# Patient Record
Sex: MALE | Race: WHITE | NOT HISPANIC OR LATINO | Employment: FULL TIME | URBAN - METROPOLITAN AREA
[De-identification: names, ages, dates, MRNs, and addresses within clinical notes are randomized per-mention and may not be internally consistent; named-entity substitution may affect disease eponyms.]

---

## 2017-01-25 ENCOUNTER — GENERIC CONVERSION - ENCOUNTER (OUTPATIENT)
Dept: OTHER | Facility: OTHER | Age: 47
End: 2017-01-25

## 2017-02-01 ENCOUNTER — ALLSCRIPTS OFFICE VISIT (OUTPATIENT)
Dept: OTHER | Facility: OTHER | Age: 47
End: 2017-02-01

## 2017-02-01 ENCOUNTER — TRANSCRIBE ORDERS (OUTPATIENT)
Dept: ADMINISTRATIVE | Facility: HOSPITAL | Age: 47
End: 2017-02-01

## 2017-02-01 DIAGNOSIS — J45.909 UNCOMPLICATED ASTHMA: ICD-10-CM

## 2017-02-01 DIAGNOSIS — J45.909 ASTHMATIC BRONCHITIS, UNSPECIFIED ASTHMA SEVERITY, UNCOMPLICATED: Primary | ICD-10-CM

## 2017-02-01 DIAGNOSIS — R07.89 OTHER CHEST PAIN: ICD-10-CM

## 2017-02-07 ENCOUNTER — GENERIC CONVERSION - ENCOUNTER (OUTPATIENT)
Dept: OTHER | Facility: OTHER | Age: 47
End: 2017-02-07

## 2017-02-07 ENCOUNTER — HOSPITAL ENCOUNTER (OUTPATIENT)
Dept: PULMONOLOGY | Facility: HOSPITAL | Age: 47
Discharge: HOME | End: 2017-02-07
Attending: INTERNAL MEDICINE
Payer: COMMERCIAL

## 2017-02-07 DIAGNOSIS — J45.909 ASTHMATIC BRONCHITIS, UNSPECIFIED ASTHMA SEVERITY, UNCOMPLICATED: ICD-10-CM

## 2017-02-07 PROCEDURE — 94060 EVALUATION OF WHEEZING: CPT

## 2017-02-07 PROCEDURE — 94729 DIFFUSING CAPACITY: CPT

## 2017-02-07 PROCEDURE — 94760 N-INVAS EAR/PLS OXIMETRY 1: CPT

## 2017-02-07 PROCEDURE — 94726 PLETHYSMOGRAPHY LUNG VOLUMES: CPT

## 2017-02-07 RX ORDER — SODIUM CHLORIDE FOR INHALATION 0.9 %
VIAL, NEBULIZER (ML) INHALATION
Status: DISCONTINUED
Start: 2017-02-07 | End: 2017-02-11 | Stop reason: HOSPADM

## 2017-02-09 ENCOUNTER — GENERIC CONVERSION - ENCOUNTER (OUTPATIENT)
Dept: OTHER | Facility: OTHER | Age: 47
End: 2017-02-09

## 2017-02-14 ENCOUNTER — GENERIC CONVERSION - ENCOUNTER (OUTPATIENT)
Dept: OTHER | Facility: OTHER | Age: 47
End: 2017-02-14

## 2017-02-24 ENCOUNTER — GENERIC CONVERSION - ENCOUNTER (OUTPATIENT)
Dept: OTHER | Facility: OTHER | Age: 47
End: 2017-02-24

## 2017-03-15 ENCOUNTER — GENERIC CONVERSION - ENCOUNTER (OUTPATIENT)
Dept: OTHER | Facility: OTHER | Age: 47
End: 2017-03-15

## 2017-03-27 ENCOUNTER — GENERIC CONVERSION - ENCOUNTER (OUTPATIENT)
Dept: OTHER | Facility: OTHER | Age: 47
End: 2017-03-27

## 2017-03-28 ENCOUNTER — GENERIC CONVERSION - ENCOUNTER (OUTPATIENT)
Dept: OTHER | Facility: OTHER | Age: 47
End: 2017-03-28

## 2017-04-05 ENCOUNTER — GENERIC CONVERSION - ENCOUNTER (OUTPATIENT)
Dept: OTHER | Facility: OTHER | Age: 47
End: 2017-04-05

## 2017-05-13 ENCOUNTER — GENERIC CONVERSION - ENCOUNTER (OUTPATIENT)
Dept: OTHER | Facility: OTHER | Age: 47
End: 2017-05-13

## 2017-05-26 ENCOUNTER — GENERIC CONVERSION - ENCOUNTER (OUTPATIENT)
Dept: OTHER | Facility: OTHER | Age: 47
End: 2017-05-26

## 2017-06-21 ENCOUNTER — GENERIC CONVERSION - ENCOUNTER (OUTPATIENT)
Dept: OTHER | Facility: OTHER | Age: 47
End: 2017-06-21

## 2017-07-27 ENCOUNTER — TRANSCRIBE ORDERS (OUTPATIENT)
Dept: SLEEP CENTER | Facility: CLINIC | Age: 47
End: 2017-07-27

## 2017-07-27 DIAGNOSIS — G47.33 OSA AND COPD OVERLAP SYNDROME (HCC): Primary | ICD-10-CM

## 2017-07-27 DIAGNOSIS — J44.9 OSA AND COPD OVERLAP SYNDROME (HCC): Primary | ICD-10-CM

## 2017-07-31 ENCOUNTER — TRANSCRIBE ORDERS (OUTPATIENT)
Dept: SLEEP CENTER | Facility: CLINIC | Age: 47
End: 2017-07-31

## 2017-07-31 ENCOUNTER — HOSPITAL ENCOUNTER (OUTPATIENT)
Dept: SLEEP CENTER | Facility: CLINIC | Age: 47
Discharge: HOME | End: 2017-07-31
Payer: COMMERCIAL

## 2017-07-31 DIAGNOSIS — J44.9 OSA AND COPD OVERLAP SYNDROME (HCC): ICD-10-CM

## 2017-07-31 DIAGNOSIS — G47.33 OSA AND COPD OVERLAP SYNDROME (HCC): ICD-10-CM

## 2017-07-31 DIAGNOSIS — G47.33 OSA (OBSTRUCTIVE SLEEP APNEA): Primary | ICD-10-CM

## 2017-08-03 ENCOUNTER — ALLSCRIPTS OFFICE VISIT (OUTPATIENT)
Dept: OTHER | Facility: OTHER | Age: 47
End: 2017-08-03

## 2017-08-03 DIAGNOSIS — M54.2 CERVICALGIA: ICD-10-CM

## 2017-08-03 DIAGNOSIS — M25.511 PAIN IN RIGHT SHOULDER: ICD-10-CM

## 2017-08-03 DIAGNOSIS — M26.629 ARTHRALGIA OF TEMPOROMANDIBULAR JOINT: ICD-10-CM
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
## 2017-08-03 DIAGNOSIS — M25.512 PAIN IN LEFT SHOULDER: ICD-10-CM

## 2017-08-03 DIAGNOSIS — M79.671 PAIN OF RIGHT FOOT: ICD-10-CM

## 2017-08-04 ENCOUNTER — GENERIC CONVERSION - ENCOUNTER (OUTPATIENT)
Dept: OTHER | Facility: OTHER | Age: 47
End: 2017-08-04

## 2017-08-18 ENCOUNTER — GENERIC CONVERSION - ENCOUNTER (OUTPATIENT)
Dept: OTHER | Facility: OTHER | Age: 47
End: 2017-08-18

## 2017-08-23 ENCOUNTER — GENERIC CONVERSION - ENCOUNTER (OUTPATIENT)
Dept: OTHER | Facility: OTHER | Age: 47
End: 2017-08-23

## 2017-08-29 ENCOUNTER — TRANSCRIBE ORDERS (OUTPATIENT)
Dept: ADMINISTRATIVE | Facility: HOSPITAL | Age: 47
End: 2017-08-29

## 2017-08-29 DIAGNOSIS — R20.0 TACTILE ANESTHESIA: Primary | ICD-10-CM

## 2017-08-31 ENCOUNTER — ALLSCRIPTS OFFICE VISIT (OUTPATIENT)
Dept: OTHER | Facility: OTHER | Age: 47
End: 2017-08-31

## 2017-09-07 ENCOUNTER — HOSPITAL ENCOUNTER (OUTPATIENT)
Dept: NEUROLOGY | Facility: CLINIC | Age: 47
Discharge: HOME | End: 2017-09-07
Payer: COMMERCIAL

## 2017-09-07 DIAGNOSIS — R20.0 TACTILE ANESTHESIA: ICD-10-CM

## 2017-09-07 PROCEDURE — 95886 MUSC TEST DONE W/N TEST COMP: CPT

## 2017-09-07 PROCEDURE — 95909 NRV CNDJ TST 5-6 STUDIES: CPT

## 2017-09-08 ENCOUNTER — ALLSCRIPTS OFFICE VISIT (OUTPATIENT)
Dept: OTHER | Facility: OTHER | Age: 47
End: 2017-09-08

## 2017-09-11 ENCOUNTER — GENERIC CONVERSION - ENCOUNTER (OUTPATIENT)
Dept: OTHER | Facility: OTHER | Age: 47
End: 2017-09-11

## 2017-09-14 ENCOUNTER — APPOINTMENT (OUTPATIENT)
Dept: RADIOLOGY | Facility: MEDICAL CENTER | Age: 47
End: 2017-09-14
Payer: COMMERCIAL

## 2017-09-14 ENCOUNTER — TRANSCRIBE ORDERS (OUTPATIENT)
Dept: ADMINISTRATIVE | Facility: HOSPITAL | Age: 47
End: 2017-09-14

## 2017-09-14 DIAGNOSIS — M79.671 PAIN OF RIGHT FOOT: ICD-10-CM

## 2017-09-14 PROCEDURE — 73630 X-RAY EXAM OF FOOT: CPT

## 2017-09-14 PROCEDURE — 73610 X-RAY EXAM OF ANKLE: CPT

## 2017-09-18 ENCOUNTER — GENERIC CONVERSION - ENCOUNTER (OUTPATIENT)
Dept: OTHER | Facility: OTHER | Age: 47
End: 2017-09-18

## 2017-09-27 ENCOUNTER — HOSPITAL ENCOUNTER (OUTPATIENT)
Dept: SLEEP CENTER | Facility: CLINIC | Age: 47
Discharge: HOME | End: 2017-09-27
Payer: COMMERCIAL

## 2017-09-27 DIAGNOSIS — G47.33 OSA (OBSTRUCTIVE SLEEP APNEA): ICD-10-CM

## 2017-09-27 PROCEDURE — 95810 POLYSOM 6/> YRS 4/> PARAM: CPT

## 2017-10-09 ENCOUNTER — HOSPITAL ENCOUNTER (OUTPATIENT)
Dept: SLEEP CENTER | Facility: CLINIC | Age: 47
Discharge: HOME | End: 2017-10-09
Payer: COMMERCIAL

## 2017-10-09 DIAGNOSIS — G47.33 OSA (OBSTRUCTIVE SLEEP APNEA): ICD-10-CM

## 2017-10-12 ENCOUNTER — GENERIC CONVERSION - ENCOUNTER (OUTPATIENT)
Dept: OTHER | Facility: OTHER | Age: 47
End: 2017-10-12

## 2017-10-19 ENCOUNTER — ALLSCRIPTS OFFICE VISIT (OUTPATIENT)
Dept: OTHER | Facility: OTHER | Age: 47
End: 2017-10-19

## 2017-10-19 DIAGNOSIS — M79.10 MYALGIA: ICD-10-CM

## 2017-10-19 DIAGNOSIS — M54.2 CERVICALGIA: ICD-10-CM
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
## 2017-10-30 ENCOUNTER — GENERIC CONVERSION - ENCOUNTER (OUTPATIENT)
Dept: OTHER | Facility: OTHER | Age: 47
End: 2017-10-30

## 2017-10-31 ENCOUNTER — ALLSCRIPTS OFFICE VISIT (OUTPATIENT)
Dept: OTHER | Facility: OTHER | Age: 47
End: 2017-10-31

## 2017-11-01 ENCOUNTER — GENERIC CONVERSION - ENCOUNTER (OUTPATIENT)
Dept: OTHER | Facility: OTHER | Age: 47
End: 2017-11-01
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
## 2017-11-07 ENCOUNTER — GENERIC CONVERSION - ENCOUNTER (OUTPATIENT)
Dept: OTHER | Facility: OTHER | Age: 47
End: 2017-11-07

## 2017-11-08 ENCOUNTER — GENERIC CONVERSION - ENCOUNTER (OUTPATIENT)
Dept: OTHER | Facility: OTHER | Age: 47
End: 2017-11-08

## 2017-12-08 ENCOUNTER — GENERIC CONVERSION - ENCOUNTER (OUTPATIENT)
Dept: PERINATAL CARE | Facility: MEDICAL CENTER | Age: 47
End: 2017-12-08

## 2017-12-18 ENCOUNTER — ALLSCRIPTS OFFICE VISIT (OUTPATIENT)
Dept: OTHER | Facility: OTHER | Age: 47
End: 2017-12-18

## 2017-12-20 ENCOUNTER — GENERIC CONVERSION - ENCOUNTER (OUTPATIENT)
Dept: OTHER | Facility: OTHER | Age: 47
End: 2017-12-20

## 2017-12-26 ENCOUNTER — GENERIC CONVERSION - ENCOUNTER (OUTPATIENT)
Dept: OTHER | Facility: OTHER | Age: 47
End: 2017-12-26

## 2018-01-02 ENCOUNTER — ALLSCRIPTS OFFICE VISIT (OUTPATIENT)
Dept: OTHER | Facility: OTHER | Age: 48
End: 2018-01-02
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
## 2018-01-10 ENCOUNTER — GENERIC CONVERSION - ENCOUNTER (OUTPATIENT)
Dept: PERINATAL CARE | Facility: MEDICAL CENTER | Age: 48
End: 2018-01-10

## 2018-01-11 NOTE — MISCELLANEOUS
Message  Per pt's request, pft's,cxr and labs have been faxed to Dr Brandi Pedersen at [phone]  Active Problems    1  Activities involving property and land maintenance, building and construction (E016 9)   (Y93 H9)   2  Asthma (493 90) (J45 909)   3  Chest tightness or pressure (786 59) (R07 89)   4  Environmental allergies (V15 09) (Z91 09)   5  Esophageal reflux (530 81) (K21 9)   6  Hyperlipidemia (272 4) (E78 5)   7  Left knee pain (719 46) (M25 562)   8  Mild coronary artery disease (414 00) (I25 10)   9  Vitamin D deficiency (268 9) (E55 9)    Current Meds   1  Spiriva Respimat 2 5 MCG/ACT Inhalation Aerosol Solution; INHALE 2 PUFFS ONCE   DAILY  Requested for: 84SEK5071; Last Rx:02Cyv6083 Ordered   2  Ventolin  (90 Base) MCG/ACT Inhalation Aerosol Solution; INHALE 1 TO 2   PUFFS EVERY 4 TO 6 HOURS AS NEEDED; Therapy: 53PKX9105 to (Last Rx:87Bub3716)  Requested for: 96Mll3764 Ordered    Allergies    1   NSAIDs    Signatures   Electronically signed by : Capri Monge, ; Feb 14 2017  9:49AM EST                       (Author)

## 2018-01-12 VITALS
HEART RATE: 88 BPM | BODY MASS INDEX: 30.79 KG/M2 | OXYGEN SATURATION: 97 % | HEIGHT: 70 IN | RESPIRATION RATE: 15 BRPM | TEMPERATURE: 98.1 F | DIASTOLIC BLOOD PRESSURE: 88 MMHG | WEIGHT: 215.06 LBS | SYSTOLIC BLOOD PRESSURE: 124 MMHG

## 2018-01-12 VITALS
HEART RATE: 84 BPM | BODY MASS INDEX: 30.64 KG/M2 | WEIGHT: 214 LBS | RESPIRATION RATE: 12 BRPM | SYSTOLIC BLOOD PRESSURE: 122 MMHG | HEIGHT: 70 IN | DIASTOLIC BLOOD PRESSURE: 90 MMHG

## 2018-01-12 VITALS
HEIGHT: 69 IN | TEMPERATURE: 97 F | SYSTOLIC BLOOD PRESSURE: 128 MMHG | WEIGHT: 213.56 LBS | DIASTOLIC BLOOD PRESSURE: 84 MMHG | OXYGEN SATURATION: 97 % | HEART RATE: 84 BPM | RESPIRATION RATE: 16 BRPM | BODY MASS INDEX: 31.63 KG/M2

## 2018-01-13 VITALS
WEIGHT: 211.13 LBS | SYSTOLIC BLOOD PRESSURE: 130 MMHG | HEART RATE: 68 BPM | DIASTOLIC BLOOD PRESSURE: 86 MMHG | BODY MASS INDEX: 31.27 KG/M2 | HEIGHT: 69 IN | OXYGEN SATURATION: 98 % | RESPIRATION RATE: 16 BRPM | TEMPERATURE: 97.8 F

## 2018-01-14 VITALS
DIASTOLIC BLOOD PRESSURE: 80 MMHG | RESPIRATION RATE: 18 BRPM | HEIGHT: 69 IN | TEMPERATURE: 98.6 F | BODY MASS INDEX: 32.14 KG/M2 | SYSTOLIC BLOOD PRESSURE: 132 MMHG | WEIGHT: 217 LBS | HEART RATE: 94 BPM | OXYGEN SATURATION: 97 %

## 2018-01-14 VITALS
HEART RATE: 106 BPM | TEMPERATURE: 97.5 F | SYSTOLIC BLOOD PRESSURE: 124 MMHG | DIASTOLIC BLOOD PRESSURE: 88 MMHG | BODY MASS INDEX: 31.3 KG/M2 | OXYGEN SATURATION: 97 % | WEIGHT: 211.31 LBS | HEIGHT: 69 IN

## 2018-01-15 ENCOUNTER — GENERIC CONVERSION - ENCOUNTER (OUTPATIENT)
Dept: PERINATAL CARE | Facility: MEDICAL CENTER | Age: 48
End: 2018-01-15
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
## 2018-01-16 NOTE — MISCELLANEOUS
Message  I spoke with patient regarding his blood work which was entirely normal  C-reactive protein, rheumatoid factor, sedimentation rate, and DAMEON were all normal  Angiotensin converting enzyme was also normal  He had full pulmonary function tests  The full pulmonary function tests did show a normal spirometry but there was a significant improvement in the FEV1 after the nebulizer  He did continue to try the [facility] but he felt that this actually made him worse  The best bronchodilator he has used to this point has been Spiriva which I renewed for him  His chest pain and discomfort still persists  Cardiac and now pulmonary causes have been essentially ruled out  He has a significant amount of fatigue  We discussed the possibility of fibromyalgia or some similar process  I suggested possibly a rheumatology evaluation  Follow-up with me at this point will be on an as-needed basis  Please do not hesitate to contact me with questions or concerns        Plan  Chest tightness or pressure    · Spiriva Respimat 2 5 MCG/ACT Inhalation Aerosol Solution; INHALE 2 PUFFS  ONCE DAILY    Signatures   Electronically signed by : NIRAJ Mejia ; Feb 9 2017  4:48PM EST                       (Author)

## 2018-01-22 VITALS
BODY MASS INDEX: 31.07 KG/M2 | HEIGHT: 70 IN | DIASTOLIC BLOOD PRESSURE: 86 MMHG | TEMPERATURE: 98.1 F | WEIGHT: 217 LBS | SYSTOLIC BLOOD PRESSURE: 122 MMHG | RESPIRATION RATE: 12 BRPM | HEART RATE: 68 BPM

## 2018-01-22 VITALS
BODY MASS INDEX: 30.37 KG/M2 | DIASTOLIC BLOOD PRESSURE: 88 MMHG | SYSTOLIC BLOOD PRESSURE: 122 MMHG | RESPIRATION RATE: 12 BRPM | TEMPERATURE: 98.6 F | OXYGEN SATURATION: 96 % | WEIGHT: 212.13 LBS | HEIGHT: 70 IN | HEART RATE: 77 BPM

## 2018-01-23 VITALS
HEART RATE: 72 BPM | WEIGHT: 215 LBS | HEIGHT: 70 IN | SYSTOLIC BLOOD PRESSURE: 120 MMHG | RESPIRATION RATE: 12 BRPM | DIASTOLIC BLOOD PRESSURE: 78 MMHG | BODY MASS INDEX: 30.78 KG/M2
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
## 2018-01-23 NOTE — RESULT NOTES
Message   Recorded as Task   Date: 12/19/2017 04:43 PM, Created By: Jyothi Schmitz   Task Name: Care Coordination   Assigned To: Kelly Edge   Regarding Patient: Janet Childress, Status: Active   Comment:    Kelly Edge - 19 Dec 2017 4:43 PM     TASK CREATED  Rec'd message from Giovana Fleming at PCP, Dr Nichole Antunez, office  Do you perform injections for TMJ discomfort?     Wesly Floyd  [phone])   Diana Corporal - 20 Dec 2017 8:56 AM     TASK REPLIED TO: Previously Assigned To Diana alcantara unfortunately I do not treat facial pain, not sure who would do TMJ, don't think anyone from our group does it   Kelly Edge - 20 Dec 2017 10:28 AM     TASK EDITED  Giovana Fleming aware and will let Dr Nichole Antunez know  Kelly Edge - 20 Dec 2017 10:58 AM     TASK EDITED  I did inquire with neurosurgery if this was something that perhaps Dr [facility] would be able to eval and treat  I was told by Richard Machado, that he could possibly offer rhizotomy but would be able to do an evaluation  I called Giovana Fleming with this information and she will pass on to Dr Catia Castrejon for you     Diana Mckenzie - 20 Dec 2017 11:28 AM     TASK REPLIED TO: Previously Assigned To Diana Guzmanal                      [place]
[id number] [facility]

## 2018-03-23 RX ORDER — CETIRIZINE HYDROCHLORIDE 10 MG/1
10 TABLET ORAL AS NEEDED
COMMUNITY

## 2018-03-23 RX ORDER — HYDROCODONE BITARTRATE AND ACETAMINOPHEN 5; 325 MG/1; MG/1
1 TABLET ORAL
COMMUNITY
Start: 2017-08-03 | End: 2018-05-24

## 2018-03-23 RX ORDER — OMEPRAZOLE 40 MG/1
CAPSULE, DELAYED RELEASE ORAL
COMMUNITY
Start: 2017-08-03 | End: 2018-03-27 | Stop reason: SDUPTHER

## 2018-03-23 RX ORDER — CLINDAMYCIN PHOSPHATE 11.9 MG/ML
SOLUTION TOPICAL 2 TIMES DAILY
COMMUNITY
Start: 2017-09-08 | End: 2020-09-19 | Stop reason: SDUPTHER

## 2018-03-23 RX ORDER — CYCLOBENZAPRINE HYDROCHLORIDE 15 MG/1
15 CAPSULE, EXTENDED RELEASE ORAL DAILY PRN
COMMUNITY
End: 2018-12-21

## 2018-03-23 RX ORDER — MINOCYCLINE HYDROCHLORIDE 100 MG/1
CAPSULE ORAL
Status: ON HOLD | COMMUNITY
Start: 2017-09-08 | End: 2018-04-20

## 2018-03-27 ENCOUNTER — OFFICE VISIT (OUTPATIENT)
Dept: FAMILY MEDICINE CLINIC | Facility: CLINIC | Age: 48
End: 2018-03-27
Payer: COMMERCIAL

## 2018-03-27 VITALS
WEIGHT: 217.6 LBS | SYSTOLIC BLOOD PRESSURE: 140 MMHG | TEMPERATURE: 98.5 F | HEART RATE: 80 BPM | BODY MASS INDEX: 31.22 KG/M2 | DIASTOLIC BLOOD PRESSURE: 88 MMHG | RESPIRATION RATE: 15 BRPM | OXYGEN SATURATION: 98 %

## 2018-03-27 DIAGNOSIS — K21.00 ESOPHAGITIS, REFLUX: Primary | ICD-10-CM

## 2018-03-27 DIAGNOSIS — M79.671 RIGHT FOOT PAIN: ICD-10-CM

## 2018-03-27 DIAGNOSIS — Z01.818 PREOP EXAMINATION: ICD-10-CM

## 2018-03-27 PROCEDURE — 93000 ELECTROCARDIOGRAM COMPLETE: CPT | Performed by: FAMILY MEDICINE

## 2018-03-27 PROCEDURE — 99244 OFF/OP CNSLTJ NEW/EST MOD 40: CPT | Performed by: FAMILY MEDICINE

## 2018-03-27 RX ORDER — OMEPRAZOLE 40 MG/1
40 CAPSULE, DELAYED RELEASE ORAL EVERY 12 HOURS
Qty: 60 CAPSULE | Refills: 5 | Status: SHIPPED | OUTPATIENT
Start: 2018-03-27 | End: 2019-07-02 | Stop reason: SDUPTHER

## 2018-03-27 RX ORDER — GABAPENTIN 300 MG/1
300 CAPSULE ORAL
Qty: 30 CAPSULE | Refills: 2 | Status: SHIPPED | OUTPATIENT
Start: 2018-03-27 | End: 2018-05-24

## 2018-03-27 NOTE — PROGRESS NOTES
Assessment/Plan:   1  Preop examination  Patient appears well today  He has a good functional capacity and no active cardiac problems  Reviewed his preop blood work all of which appeared stable  His EKG today appear to show NSR/no ST or T-wave changes  This type of procedure is considered intermediate risk  Patient is cleared with intermediate perioperative cardiovascular risk  He will be starting Xarelto postoperatively the by Podiatry  No further testing needed today  2  Esophagitis, reflux  Patient's symptoms appear stable  Continue with current treatment of omeprazole  - omeprazole (PriLOSEC) 40 MG capsule; Take 1 capsule (40 mg total) by mouth every 12 (twelve) hours  Dispense: 60 capsule; Refill: 5    3  Right foot pain  Patient has been having persistent pain  He may continue with use of anti-inflammatories however this will cause gastric irritation  At this time dot start treatment gabapentin   - gabapentin (NEURONTIN) 300 mg capsule; Take 1 capsule (300 mg total) by mouth daily at bedtime  Dispense: 30 capsule; Refill: 2     Diagnoses and all orders for this visit:    Esophagitis, reflux  -     omeprazole (PriLOSEC) 40 MG capsule; Take by mouth    Other orders  -     omeprazole (PriLOSEC) 40 MG capsule; Take by mouth  -     HYDROcodone-acetaminophen (NORCO) 5-325 mg per tablet; Take 1 tablet by mouth  -     cyclobenzaprine (AMRIX) 15 MG 24 hr capsule; Take 15 mg by mouth  -     cetirizine (ZyrTEC) 10 mg tablet; Take 10 mg by mouth  -     clindamycin (CLEOCIN T) 1 % external solution; Apply topically 2 (two) times a day  -     minocycline (MINOCIN) 100 mg capsule;           Subjective:    Chief Complaint   Patient presents with    Pre-op Exam        Patient ID: Hank Derick is a 50 y o  male      Katya Kaur  50 y o   male    SURGEON:Dr Ju Covarrubias    SURGERY/PROCEDURE: right foot gastrocnemius recession, 2nd and 3rd interdigital space exploration    DATE OF SURGERY: 4/20/18    PRIOR ANESTHESIA:yes    COMPLICATION: no    BLEEDING PROBLEM: no    PERTINENT PMH: no    EXERCISE CAPACITY:   CAN WALK 4 BLOCKS AND OR CLIMB 2 FLIGHTS: Yes    HOME LIVING SITUATION SAFE AND SECURE: Yes      TOBACCO: no     ETOH: yes     ILLEGAL DRUGS: no        Review of Systems   Constitutional: Negative for activity change, chills, fatigue and fever  HENT: Negative for congestion, ear pain, sinus pressure and sore throat  Eyes: Negative for redness, itching and visual disturbance  Respiratory: Negative for cough and shortness of breath  Cardiovascular: Negative for chest pain and palpitations  Gastrointestinal: Negative for abdominal pain, diarrhea and nausea  Endocrine: Negative for cold intolerance and heat intolerance  Genitourinary: Negative for dysuria, flank pain and frequency  Musculoskeletal: Negative for arthralgias, back pain, gait problem and myalgias  Skin: Negative for color change  Allergic/Immunologic: Negative for environmental allergies  Neurological: Negative for dizziness, numbness and headaches  Psychiatric/Behavioral: Negative for behavioral problems and sleep disturbance  The following portions of the patient's history were reviewed and updated as appropriate : past family history, past medical history, past social history and past surgical history  Objective:    Vitals:    03/27/18 1658   BP: 140/88   BP Location: Left arm   Patient Position: Sitting   Cuff Size: Adult   Pulse: 80   Resp: 15   Temp: 98 5 °F (36 9 °C)   TempSrc: Tympanic   SpO2: 98%   Weight: 98 7 kg (217 lb 9 6 oz)        Physical Exam   Constitutional: He is oriented to person, place, and time  He appears well-developed and well-nourished  HENT:   Head: Normocephalic and atraumatic  Nose: Nose normal    Mouth/Throat: No oropharyngeal exudate  Eyes: Pupils are equal, round, and reactive to light  Right eye exhibits no discharge  Left eye exhibits no discharge     Neck: Normal range of motion  Neck supple  No tracheal deviation present  Cardiovascular: Normal rate, regular rhythm and intact distal pulses  Exam reveals no gallop and no friction rub  No murmur heard  Pulses:       Dorsalis pedis pulses are 2+ on the right side, and 2+ on the left side  Posterior tibial pulses are 2+ on the right side, and 2+ on the left side  Pulmonary/Chest: Effort normal and breath sounds normal  No respiratory distress  He has no wheezes  He has no rales  Abdominal: Soft  Bowel sounds are normal  He exhibits no distension  There is no tenderness  There is no rebound and no guarding  Musculoskeletal: Normal range of motion  He exhibits no edema  Lymphadenopathy:        Head (right side): No submental and no submandibular adenopathy present  Head (left side): No submental and no submandibular adenopathy present  He has no cervical adenopathy  Right cervical: No superficial cervical, no deep cervical and no posterior cervical adenopathy present  Left cervical: No superficial cervical, no deep cervical and no posterior cervical adenopathy present  Neurological: He is alert and oriented to person, place, and time  No cranial nerve deficit or sensory deficit  Skin: Skin is warm, dry and intact  Psychiatric: His speech is normal and behavior is normal  Judgment normal  His mood appears not anxious  Cognition and memory are normal  He does not exhibit a depressed mood  Vitals reviewed

## 2018-04-02 ENCOUNTER — EVALUATION (OUTPATIENT)
Dept: PHYSICAL THERAPY | Facility: MEDICAL CENTER | Age: 48
End: 2018-04-02
Payer: COMMERCIAL

## 2018-04-02 DIAGNOSIS — G44.229 CHRONIC TENSION-TYPE HEADACHE, NOT INTRACTABLE: Primary | ICD-10-CM

## 2018-04-02 DIAGNOSIS — M26.603 BILATERAL TEMPOROMANDIBULAR JOINT DISORDER: ICD-10-CM

## 2018-04-02 DIAGNOSIS — M54.2 CERVICALGIA: ICD-10-CM

## 2018-04-02 PROCEDURE — 97163 PT EVAL HIGH COMPLEX 45 MIN: CPT | Performed by: PHYSICAL THERAPIST

## 2018-04-02 PROCEDURE — G8990 OTHER PT/OT CURRENT STATUS: HCPCS | Performed by: PHYSICAL THERAPIST

## 2018-04-02 PROCEDURE — G8991 OTHER PT/OT GOAL STATUS: HCPCS | Performed by: PHYSICAL THERAPIST

## 2018-04-02 PROCEDURE — 97112 NEUROMUSCULAR REEDUCATION: CPT | Performed by: PHYSICAL THERAPIST

## 2018-04-02 RX ORDER — MULTIVITAMIN
1 CAPSULE ORAL DAILY
COMMUNITY

## 2018-04-02 RX ORDER — IBUPROFEN 200 MG
800 TABLET ORAL EVERY 6 HOURS PRN
COMMUNITY
End: 2018-12-21

## 2018-04-02 NOTE — PRE-PROCEDURE INSTRUCTIONS
Pre-Surgery Instructions:   Medication Instructions    Aspirin-Acetaminophen-Caffeine (EXCEDRIN PO) Instructed patient per Anesthesia Guidelines   cetirizine (ZyrTEC) 10 mg tablet Instructed patient per Anesthesia Guidelines   clindamycin (CLEOCIN T) 1 % external solution Instructed patient per Anesthesia Guidelines   cyclobenzaprine (AMRIX) 15 MG 24 hr capsule Instructed patient per Anesthesia Guidelines   gabapentin (NEURONTIN) 300 mg capsule Instructed patient per Anesthesia Guidelines   HYDROcodone-acetaminophen (NORCO) 5-325 mg per tablet Instructed patient per Anesthesia Guidelines   ibuprofen (MOTRIN) 200 mg tablet Instructed patient per Anesthesia Guidelines   minocycline (MINOCIN) 100 mg capsule Instructed patient per Anesthesia Guidelines   Multiple Vitamin (MULTIVITAMIN) capsule Instructed patient per Anesthesia Guidelines   omeprazole (PriLOSEC) 40 MG capsule Instructed patient per Anesthesia Guidelines   Probiotic Product (PROBIOTIC ADVANCED PO) Instructed patient per Anesthesia Guidelines  Per anesthesia patient was told to stop NSAIDS and supplements one week preop and on DOS may take Omeprazole and if needed, Norco, with sip of water

## 2018-04-02 NOTE — PROGRESS NOTES
Patient is allergic to Chlorhexidine so was told to use Dial or any antibacterial soap that he is able to tolerate and preop showering instructions per hospital protocol were given

## 2018-04-13 ENCOUNTER — OFFICE VISIT (OUTPATIENT)
Dept: PHYSICAL THERAPY | Facility: MEDICAL CENTER | Age: 48
End: 2018-04-13
Payer: COMMERCIAL

## 2018-04-13 DIAGNOSIS — G44.229 CHRONIC TENSION-TYPE HEADACHE, NOT INTRACTABLE: Primary | ICD-10-CM

## 2018-04-13 DIAGNOSIS — M54.2 CERVICALGIA: ICD-10-CM

## 2018-04-13 DIAGNOSIS — M26.603 BILATERAL TEMPOROMANDIBULAR JOINT DISORDER: ICD-10-CM

## 2018-04-13 PROCEDURE — 97140 MANUAL THERAPY 1/> REGIONS: CPT | Performed by: PHYSICAL THERAPIST

## 2018-04-13 PROCEDURE — 97112 NEUROMUSCULAR REEDUCATION: CPT | Performed by: PHYSICAL THERAPIST

## 2018-04-13 NOTE — PROGRESS NOTES
Daily Note     Today's date: 2018  Patient name: Doe Nguyen  : 1970  MRN: [id number]  Referring provider: Radha Aguirre DO  Dx:   Encounter Diagnosis     ICD-10-CM    1  Chronic tension-type headache, not intractable G44 229    2  Bilateral temporomandibular joint disorder M26 603    3  Cervicalgia M54 2                   Subjective: Patient reports he is in much greater pain (systemically) today due to having to stop his NSAIDs 7 days prior to surgery for his R foot/ankle (achilles lengthening, ankle debridement, and godinez's neurectomy)  Thus, headache is drastically increased as well as the facial pain  He notes he has been more aware of clenching though and has begun to decrease clenching  Objective: See treatment diary below      Assessment:   1) poor TMJ movement coordination - addressing with neuromotor retraining - improved, but limited in progress today due to drastic change in pain meds  2) poor cervicothoracic movement coordination - addressing with functional retraining  3) high symptom irritability - addressing with mobs and mobility exercises, extensive counseling regarding pain neuroscience, diagnosis, prognosis, care options, and care planning - decreased headache and improved self-management strategies s/p session  4) poor postural control - addressing with neuromotor retraining       Goals  Patient will be independent with home exercise program  -in progress  Patient will be able to manage symptoms independently  -in progress  Patient will be able to chew without limitation due to pain -in progress  Patient will be able to eat without limitation due to pain -in progress  Patient will be able to yawn without limitation due to pain -in progress      Plan: Continue per plan of care  Reassess in 2 weeks  However, this episode of care will likely end at that point due to impending foot surgery and need to focus his rehabilitative care on that recovery      Precautions: depression    Daily Treatment Diary     Date:  4/2 4/13           Manual              H/A SNAG  SK           MFR to temporalis  SK                                     Active/  Assistive Interventions              TMJ relaxed position 10 review           TMJ controlled opening 10 10           Self H/A SNAG  10x:10                                                                                                                                             Modalities

## 2018-04-19 ENCOUNTER — ANESTHESIA EVENT (OUTPATIENT)
Dept: PERIOP | Facility: HOSPITAL | Age: 48
End: 2018-04-19
Payer: COMMERCIAL

## 2018-04-20 ENCOUNTER — APPOINTMENT (OUTPATIENT)
Dept: RADIOLOGY | Facility: HOSPITAL | Age: 48
End: 2018-04-20
Payer: COMMERCIAL

## 2018-04-20 ENCOUNTER — ANESTHESIA (OUTPATIENT)
Dept: PERIOP | Facility: HOSPITAL | Age: 48
End: 2018-04-20
Payer: COMMERCIAL

## 2018-04-20 ENCOUNTER — HOSPITAL ENCOUNTER (OUTPATIENT)
Facility: HOSPITAL | Age: 48
Setting detail: OUTPATIENT SURGERY
Discharge: HOME | End: 2018-04-20
Attending: PODIATRIST | Admitting: PODIATRIST
Payer: COMMERCIAL

## 2018-04-20 ENCOUNTER — HOSPITAL ENCOUNTER (OUTPATIENT)
Dept: RADIOLOGY | Facility: HOSPITAL | Age: 48
Setting detail: OUTPATIENT SURGERY
Discharge: HOME | End: 2018-04-20
Payer: COMMERCIAL

## 2018-04-20 VITALS
BODY MASS INDEX: 32.89 KG/M2 | HEIGHT: 68 IN | RESPIRATION RATE: 16 BRPM | SYSTOLIC BLOOD PRESSURE: 122 MMHG | OXYGEN SATURATION: 96 % | WEIGHT: 217 LBS | TEMPERATURE: 97.4 F | HEART RATE: 84 BPM | DIASTOLIC BLOOD PRESSURE: 71 MMHG

## 2018-04-20 DIAGNOSIS — M25.871 OTHER SPECIFIED JOINT DISORDERS, RIGHT ANKLE AND FOOT: ICD-10-CM

## 2018-04-20 DIAGNOSIS — Z98.890 S/P FOOT SURGERY, RIGHT: Primary | ICD-10-CM

## 2018-04-20 PROCEDURE — 73610 X-RAY EXAM OF ANKLE: CPT

## 2018-04-20 PROCEDURE — 73650 X-RAY EXAM OF HEEL: CPT

## 2018-04-20 RX ORDER — CEPHALEXIN 500 MG/1
500 CAPSULE ORAL EVERY 6 HOURS SCHEDULED
Qty: 28 CAPSULE | Refills: 0 | Status: SHIPPED | OUTPATIENT
Start: 2018-04-20 | End: 2018-04-27

## 2018-04-20 RX ORDER — ONDANSETRON 2 MG/ML
INJECTION INTRAMUSCULAR; INTRAVENOUS AS NEEDED
Status: DISCONTINUED | OUTPATIENT
Start: 2018-04-20 | End: 2018-04-20 | Stop reason: SURG

## 2018-04-20 RX ORDER — PROPOFOL 10 MG/ML
INJECTION, EMULSION INTRAVENOUS AS NEEDED
Status: DISCONTINUED | OUTPATIENT
Start: 2018-04-20 | End: 2018-04-20 | Stop reason: SURG

## 2018-04-20 RX ORDER — MIDAZOLAM HYDROCHLORIDE 1 MG/ML
INJECTION INTRAMUSCULAR; INTRAVENOUS AS NEEDED
Status: DISCONTINUED | OUTPATIENT
Start: 2018-04-20 | End: 2018-04-20 | Stop reason: SURG

## 2018-04-20 RX ORDER — HYDROCODONE BITARTRATE AND ACETAMINOPHEN 5; 325 MG/1; MG/1
1 TABLET ORAL EVERY 6 HOURS PRN
Qty: 35 TABLET | Refills: 0 | Status: SHIPPED | OUTPATIENT
Start: 2018-04-20 | End: 2018-04-20 | Stop reason: HOSPADM

## 2018-04-20 RX ORDER — SODIUM CHLORIDE 9 MG/ML
125 INJECTION, SOLUTION INTRAVENOUS CONTINUOUS
Status: DISCONTINUED | OUTPATIENT
Start: 2018-04-20 | End: 2018-04-20 | Stop reason: HOSPADM

## 2018-04-20 RX ORDER — GLYCOPYRROLATE 0.2 MG/ML
INJECTION INTRAMUSCULAR; INTRAVENOUS AS NEEDED
Status: DISCONTINUED | OUTPATIENT
Start: 2018-04-20 | End: 2018-04-20 | Stop reason: SURG

## 2018-04-20 RX ORDER — ONDANSETRON 2 MG/ML
4 INJECTION INTRAMUSCULAR; INTRAVENOUS ONCE AS NEEDED
Status: DISCONTINUED | OUTPATIENT
Start: 2018-04-20 | End: 2018-04-20 | Stop reason: HOSPADM

## 2018-04-20 RX ORDER — FENTANYL CITRATE/PF 50 MCG/ML
50 SYRINGE (ML) INJECTION
Status: DISCONTINUED | OUTPATIENT
Start: 2018-04-20 | End: 2018-04-20 | Stop reason: HOSPADM

## 2018-04-20 RX ORDER — ROPIVACAINE HYDROCHLORIDE 2 MG/ML
INJECTION, SOLUTION EPIDURAL; INFILTRATION; PERINEURAL AS NEEDED
Status: DISCONTINUED | OUTPATIENT
Start: 2018-04-20 | End: 2018-04-20 | Stop reason: SURG

## 2018-04-20 RX ORDER — MAGNESIUM HYDROXIDE 1200 MG/15ML
LIQUID ORAL AS NEEDED
Status: DISCONTINUED | OUTPATIENT
Start: 2018-04-20 | End: 2018-04-20 | Stop reason: HOSPADM

## 2018-04-20 RX ORDER — ROCURONIUM BROMIDE 10 MG/ML
INJECTION, SOLUTION INTRAVENOUS AS NEEDED
Status: DISCONTINUED | OUTPATIENT
Start: 2018-04-20 | End: 2018-04-20 | Stop reason: SURG

## 2018-04-20 RX ORDER — HYDROCODONE BITARTRATE AND ACETAMINOPHEN 5; 325 MG/1; MG/1
1 TABLET ORAL EVERY 6 HOURS PRN
Status: DISCONTINUED | OUTPATIENT
Start: 2018-04-20 | End: 2018-04-20 | Stop reason: HOSPADM

## 2018-04-20 RX ORDER — DEXMEDETOMIDINE HYDROCHLORIDE 100 UG/ML
INJECTION, SOLUTION INTRAVENOUS AS NEEDED
Status: DISCONTINUED | OUTPATIENT
Start: 2018-04-20 | End: 2018-04-20 | Stop reason: SURG

## 2018-04-20 RX ADMIN — LIDOCAINE HYDROCHLORIDE 40 MG: 20 INJECTION, SOLUTION INTRAVENOUS at 16:23

## 2018-04-20 RX ADMIN — PROPOFOL 200 MG: 10 INJECTION, EMULSION INTRAVENOUS at 16:23

## 2018-04-20 RX ADMIN — ROCURONIUM BROMIDE 50 MG: 10 INJECTION INTRAVENOUS at 16:23

## 2018-04-20 RX ADMIN — SODIUM CHLORIDE 125 ML/HR: 0.9 INJECTION, SOLUTION INTRAVENOUS at 13:26

## 2018-04-20 RX ADMIN — GLYCOPYRROLATE 0.4 MG: 0.2 INJECTION, SOLUTION INTRAMUSCULAR; INTRAVENOUS at 17:56

## 2018-04-20 RX ADMIN — MIDAZOLAM 4 MG: 1 INJECTION INTRAMUSCULAR; INTRAVENOUS at 14:54

## 2018-04-20 RX ADMIN — SODIUM CHLORIDE: 0.9 INJECTION, SOLUTION INTRAVENOUS at 16:14

## 2018-04-20 RX ADMIN — DEXAMETHASONE SODIUM PHOSPHATE 8 MG: 10 INJECTION INTRAMUSCULAR; INTRAVENOUS at 16:55

## 2018-04-20 RX ADMIN — DEXMEDETOMIDINE HYDROCHLORIDE 20 MCG: 100 INJECTION, SOLUTION INTRAVENOUS at 17:42

## 2018-04-20 RX ADMIN — SODIUM CHLORIDE: 0.9 INJECTION, SOLUTION INTRAVENOUS at 18:04

## 2018-04-20 RX ADMIN — ONDANSETRON HYDROCHLORIDE 4 MG: 2 INJECTION, SOLUTION INTRAVENOUS at 16:55

## 2018-04-20 RX ADMIN — NEOSTIGMINE METHYLSULFATE 3 MG: 1 INJECTION, SOLUTION INTRAMUSCULAR; INTRAVENOUS; SUBCUTANEOUS at 17:56

## 2018-04-20 RX ADMIN — CEFAZOLIN SODIUM 2000 MG: 2 SOLUTION INTRAVENOUS at 16:47

## 2018-04-20 RX ADMIN — ROPIVACAINE HYDROCHLORIDE 30 ML: 2 INJECTION, SOLUTION EPIDURAL; INFILTRATION at 15:02

## 2018-04-20 NOTE — OP NOTE
OPERATIVE REPORT  PATIENT NAME: Joaquín Proctor    :  1970  MRN: [id number]  Pt Location: [facility] OR ROOM 07    SURGERY DATE: 2018    Surgeon(s) and Role:     * Quinton Reddy DPM - Primary     * Tate Serna DPM - Assisting    Preop Diagnosis:  Other specified joint disorders, right ankle and foot [M25 871]    Post-Op Diagnosis Codes:     * Other specified joint disorders, right ankle and foot [M25 871]    Procedure(s) (LRB):  GASTROCNEMIUS RECESSION RIGHT, REMOVAL TROGONUM BONE, POST ANKLE EXPLORATION 2ND 3RD INTERDIGITAL SPACE W/DECOMPRESS NERVE (Right)    Specimen(s):  * No specimens in log *    Estimated Blood Loss:   Minimal    Drains: none      Anesthesia Type:   General with 10cc of 1:1 mixture  1% lidocaine with epinephrine and 0 5 Marcaine plain    Hemostasis:  Pneumatic thigh tourniquet at 300 mm of mercury for 60 minutes    Materials:  3-0 Vicryl, 4-0 Monocryl    Operative Indications: Other specified joint disorders, right ankle and foot [M25 871]    Operative Findings:  Os trigonum bone was easily manipulated from the posterior ankle and taken out  Erosive changes noted on the posterior superior aspect of the calcaneus which is consistent with local irritation by os trigonum bone  Complications:   None    Procedure and Technique:  Under mild sedation, the patient was brought into the operating room and placed on the operating room table in the prone position  A pneumatic thigh tourniquet was then placed around the patient's right thigh with ample webril padding  A time out was performed to confirm the correct patient, procedure and site with all parties in agreement  The foot was then scrubbed, prepped and draped in the usual aseptic manner       Local anesthetic was obtained about the patient's right lower extremity and injection was performed consisting of 10 ml of 1% Lidocaine with epinephrine and 0 5% marcaine plain in a 1:1 mixture   Attention was then directed to the posterior aspect of the left calf where a 3 5 cm linear incision was effected along the medial central aspect of left calf along the myotendinous junction   Dissection continued down to the deep fascia  Utilizing a blunt dissection a finger was placed along the deep fascia both medially and laterally to expose the deep fascia  With adequate retraction a linear incision was made through the deep fascia exposing the underlying gastrocnemius aponeurosis  Via blunt dissection the paratenon and deep fascia was  from the underlying aponeurosis both medially and laterally  With adequate retraction, the aponeurosis was incised from medial to lateral  The foot was dorsiflexed and adequate reduction of the contracture was appreciated  Following irrigation the paratenon was reapproximated with 3-0 Vicryl  Subcutaneous structures were re-approximated and closed with 3-0 vicryl in running fashion  Then skin was closed with 4-0 monocryl in running subcuticular technique  Mastisol and Steri-Strips were applied  Attention was then directed to the lateral aspect of the ankle where an incision approximately 4 cm was made posterior to the peroneals and centered over the superior aspect of the os calcis  This  Incision was deepened by sharp and blunt dissection to subcutaneous level care was taken as to retract the neurovascular structures to the area along with the sural nerve and was found to have a communicating branch  This communicating branch was mobilized and retracted posteriorly as well throughout the procedure  The incision was made sharply through the posterior capsule, and at this time the trigonum bone was identified at the posterior aspect of the ankle  It was found to be loose, manipulated with osteotome with care taken as to not to damage the articular cartilage of the posterior subtalar joint  Subsequently the os trigonum bone was removed easily from the area with use of ronguer    C-arm was used to confirm the bone fragment removal   Final inspection of the surgical site revealed that there was erosive changes noted to the posterior superior aspect of the calcaneus which is consistent with irritation secondary to os trigonum  Surgical site was copiously irrigated with sterile saline  Subcutaneous structures were reapproximated and closed with 3 0 Vicryl in running fashion  Skin was closed with 4 0 Monocryl in running subcuticular technique  Mastisol and Steri-Strips were applied  Both posterior calf and lateral ankle surgical sites were dressed with Xeroform 4 x 4 gauze and Kerlix  Then patient was switched from prone to supine position in to the stretcher  The foot was then rescrubbed prepped and draped in usual aseptic manner  Then a 3 5cm linear incision was effected over the dorsal aspect of the 3rd metatarsal extending over the toe  With blunt dissection small vessels were ligated and or cauterized as needed with dissection down to the 3rd interdigital space where a hypertrophied nerve was appreciated but no gross neuroma, this was decompressed proximally and distally  Via the same incision attention was then directed to the 2nd interspace  Small vessels to the area were retracted  Then self retaining retractor was used to distract the interspace  Dissection was continued where the interdigital nerve was identified  There was no neuroma, and simple decompression by way of exploration was performed  Both interspaces were irrigated with copious amounts of sterile saline  Subcutaneous tissues were then reapproximated with 3 0 Vicryl in running fashion  Skin was closed with 4 0 Monocryl in running subcuticular fashion  Mastisol and Steri-Strips were then applied  Surgical site dressed with Xeroform and dry sterile dressing  The dressings on the posterior calf and lateral ankle were changed as well to Xeroform dry sterile dressing    Then with ample Webril padding Ace bandages were applied this was then secured with posterior splint and Ace wraps  Tourniquet was deflated at this time a normal hyperemic response was noted to all the digits  Patient tolerated the procedure and anesthesia well and was transported to the PACU with vital signs stable      Patient Disposition:  PACU  and hemodynamically stable    SIGNATURE: Mily Reza DPM  DATE: April 20, 2018  TIME: 6:36 PM

## 2018-04-20 NOTE — DISCHARGE SUMMARY
Discharge Summary Outpatient Procedure Podiatry -   Gianna Valladares 50 y o  male MRN: [id number]  Unit/Bed#: OR POOL Encounter: [id number]    Admission Date: 4/20/2018     Admitting Diagnosis: Other specified joint disorders, right ankle and foot [M25 871]    Discharge Diagnosis: same    Procedures Performed: GASTROCNEMIUS RECESSION RIGHT, REMOVAL TROGONUM BONE, POST ANKLE EXPLORATION 2ND 3RD INTERDIGITAL SPACE W/DECOMPRESS NERVE: 16990 (CPT®) right lower extremity    Complications: none    Condition at Discharge: stable    Discharge instructions/Information to patient and family:   See after visit summary for information provided to patient and family  Provisions for Follow-Up Care/Important appointments:  See after visit summary for information related to follow-up care and any pertinent home health orders  Discharge Medications:  See after visit summary for reconciled discharge medications provided to patient and family

## 2018-04-20 NOTE — ANESTHESIA PROCEDURE NOTES
Peripheral Block    Patient location during procedure: pre-op  Start time: 4/20/2018 2:54 PM  Removal time: 4/20/2018 3:03 PM  Reason for block: at surgeon's request and post-op pain management  Staffing  Anesthesiologist: JEMIMA GILLIS  Performed: anesthesiologist   Preanesthetic Checklist  Completed: patient identified, site marked, surgical consent, pre-op evaluation, timeout performed, IV checked, risks and benefits discussed and monitors and equipment checked  Peripheral Block  Patient position: prone  Prep: ChloraPrep  Patient monitoring: heart rate, cardiac monitor, continuous pulse ox and frequent blood pressure checks  Block type: popliteal  Laterality: right  Injection technique: single-shot  Procedures: ultrasound guided and nerve stimulator  Ultrasound permanent image saved  Local infiltration: ropivacaine  Infiltration strength: 0 5 %  Dose: 30 mL  Needle  Needle type: Stimuplex   Needle localization: ultrasound guidance, anatomical landmarks and nerve stimulator  Assessment  Injection assessment: incremental injection, local visualized surrounding nerve on ultrasound, negative aspiration for heme and no paresthesia on injection  Paresthesia pain: none  Heart rate change: no  Slow fractionated injection: yes  Post-procedure:  site cleaned  patient tolerated the procedure well with no immediate complications

## 2018-04-20 NOTE — ANESTHESIA PREPROCEDURE EVALUATION
Review of Systems/Medical History  Patient summary reviewed  Chart reviewed  No history of anesthetic complications     Cardiovascular  No past MI , CAD (neg cath 2015), , No history of CABG, No cardiac stents  No history of percutaneous transluminal coronary angioplasty, PAYTON (had cath for dyspnea ),    Pulmonary  Shortness of breath,        GI/Hepatic    GERD well controlled,        Negative  ROS        Endo/Other  Negative endo/other ROS      GYN       Hematology  Negative hematology ROS      Musculoskeletal    Arthritis     Neurology    Headaches,    Psychology   Negative psychology ROS              Physical Exam    Airway    Mallampati score: II  TM Distance: >3 FB  Neck ROM: full     Dental   No notable dental hx     Cardiovascular  Rhythm: regular, Rate: normal, Cardiovascular exam normal    Pulmonary  Pulmonary exam normal Breath sounds clear to auscultation,     Other Findings        Anesthesia Plan  ASA Score- 2     Anesthesia Type- IV sedation with anesthesia with ASA Monitors  Additional Monitors:   Airway Plan:     Comment: Pop block preop  Has Hx of "being violent" after some procedures        Plan Factors-Patient not instructed to abstain from smoking on day of procedure  Patient did not smoke on day of surgery  Induction- intravenous  Postoperative Plan- Plan for postoperative opioid use  Informed Consent- Anesthetic plan and risks discussed with patient  I personally reviewed this patient with the CRNA  Discussed and agreed on the Anesthesia Plan with the CRNA  Rosella Goldmann

## 2018-04-20 NOTE — DISCHARGE INSTRUCTIONS
[facility] 53  [facility]  Dr Jenni Pardo  1)  Cold Pack: Apply a cold pack for 45 - Minutes intervals just above the surgical site for the initial 24-48 hours  Never place on the toes  If a cast has been applied  Apply the cold pack to the thigh or knee area  2)  Elevation and Rest: Keep the foot elevated at least as high as the hips for the first 24-48 hours  It would be beneficial for the foot to be elevated when you are sitting during the first 7-10 days  Elevating the foot above the heart level will help control post operative pain and swelling  3)  Medication: Take prescription as prescribed by your physician  If you have any difficulty or side effects with the medication, stop taking immediately and notify your physician at once  Medications given today:     Keflex  Patient already has vicodin Rx filled  3a  if applicable you may be given one of the following for prevention of blood clots  O Patient already has xarelto Rx filled  Protection of Surgical Site/ Assistive Devices:  a) You will be given one of the following:  O posterior splint, non weight bearing     4) DO NOT GET THE BADAGE WET UNTIL THE DOCTOR GIVES PERMISSION  Keep the bandage clean, dry and intact until your follow-up appointment  5) If you had a peripheral nerve block performed by anesthesia department the numbness and loss of motor function of the extremity can last anywhere from 12-24 hours and sometimes a little bit longer    General Information  1)  Bleeding: some bleeding through the bandage is normal  If bleeding persists, you may attempt to reinforce the existing bandage while following the above instruction  If bleeding persists, notify the physician  2)  Temperature: if your temperature rises [id number] 5 degree, call the office [phone]  3)  Problem: If you notice increasing swelling and/ or pain 2 to 3 days following surgery , please notify    If a splint has been applied and you experience pain to extent the medication prescribed for pain does not seem to be effective you should remove the ace wraps and remove the splint if applied and allow about one half hour for relief then reapply splint and ace wraps  Should pain persist then please notify your physician  4)  Redressing: call the office the day following surgery to schedule a redressing to be in 7 days  Pain: Within the first 24 hours following surgery, if your pain is not controlled sufficiently with pain medication, please check that your bandage is not too tight  You may loosen the badge without removing it  Wait 30 minutes, if your pain is not relieved  Please call the office [phone]             Deep Venous Thrombosis   WHAT YOU NEED TO KNOW:   Deep venous thrombosis (DVT) is a blood clot that forms in a deep vein of the body  The deep veins in the legs, thighs, and hips are the most common sites for DVT  DVT can also occur in a deep vein within your arms  The clot prevents the normal flow of blood in the vein  The blood backs up and causes pain and swelling  The DVT can break into smaller pieces and travel to your lungs and cause a blockage called a pulmonary embolism  A pulmonary embolism can become life-threatening  DISCHARGE INSTRUCTIONS:   Call 911 for any of the following:   · You feel lightheaded, short of breath, and have chest pain  · You cough up blood  Seek care immediately if:   · Your symptoms get worse  · You develop new DVT symptoms in another leg or arm  Contact your healthcare provider if:   · Your gums or nose bleed  · You see blood in your urine or bowel movements  · Your bowel movements are black or darker than normal     · You have questions or concerns about your conditions or care  Medicines:   · Blood thinners  help prevent the DVT from getting bigger and prevent new clots from forming   Examples of blood thinners include heparin, rivaroxaban, apixiban, and warfarin  The following are general safety guidelines to follow while you are taking a blood thinner:     ¨ Watch for bleeding and bruising  Watch for bleeding from your gums or nose  Watch for blood in your urine and bowel movements  Use a soft washcloth on your skin, and a soft toothbrush to brush your teeth  This can keep your skin and gums from bleeding  If you shave, use an electric shaver  Do not play contact sports  ¨ Tell your dentist and other healthcare providers that you take a blood thinner  Wear a bracelet or necklace that says you take this medicine  ¨ Do not start or stop any medicines unless your healthcare provider tells you to  Many medicines cannot be used with blood thinners  ¨ Tell your healthcare provider right away if you forget to take the blood thinner , or if you take too much  ¨ Warfarin  is a blood thinner that you may need to take  The following are additional things you should be aware of if you take warfarin:    § Foods and medicines can affect the amount of warfarin in your blood  Do not make major changes to your diet  Warfarin works best when you eat about the same amount of vitamin K every day  Vitamin K is found in green leafy vegetables and certain other foods  Ask for more information about what to eat or not to eat  § You will need to see your healthcare provider for follow-up visits  You will need regular blood tests to decide how much warfarin you need  · Take your medicine as directed  Contact your healthcare provider if you think your medicine is not helping or if you have side effects  Tell him or her if you are allergic to any medicine  Keep a list of the medicines, vitamins, and herbs you take  Include the amounts, and when and why you take them  Bring the list or the pill bottles to follow-up visits  Carry your medicine list with you in case of an emergency  Manage your DVT:   · Wear pressure stockings    The stockings are tight and put pressure on your legs  This improves blood flow and helps prevent clots  Wear the stockings during the day  Do not wear them when you sleep  · Elevate your legs  above the level of your heart as often as you can  This will help decrease swelling and pain  Prop your legs on pillows or blankets to keep them elevated comfortably  · Exercise regularly  to help increase your blood flow  Walking is a good low-impact exercise  Talk to your healthcare provider about the best exercise plan for you  · Change body positions often  If you travel by car or work at a desk, move and stretch in your seat several times each hour  In an airplane, get up and walk every hour  If you are bedridden, ask for help to change your position every 1 to 2 hours  · Maintain a healthy weight  Ask your healthcare provider how much you should weigh  Ask him to help you create a weight loss plan if you are overweight  · Do not smoke  Nicotine and other chemicals in cigarettes and cigars can damage blood vessels and make it more difficult to manage your DVT  Ask your healthcare provider for information if you currently smoke and need help to quit  E-cigarettes or smokeless tobacco still contain nicotine  Talk to your healthcare provider before you use these products  Follow up with your healthcare provider as directed:  Write down your questions so you remember to ask them during your visits  © 2017 [address] Information is for End User's use only and may not be sold, redistributed or otherwise used for commercial purposes  All illustrations and images included in CareNotes® are the copyrighted property of [employer]  or Cristobal Ribeiro  The above information is an  only  It is not intended as medical advice for individual conditions or treatments  Talk to your doctor, nurse or pharmacist before following any medical regimen to see if it is safe and effective for you

## 2018-04-20 NOTE — H&P (VIEW-ONLY)
Assessment/Plan:   1  Preop examination  Patient appears well today  He has a good functional capacity and no active cardiac problems  Reviewed his preop blood work all of which appeared stable  His EKG today appear to show NSR/no ST or T-wave changes  This type of procedure is considered intermediate risk  Patient is cleared with intermediate perioperative cardiovascular risk  He will be starting Xarelto postoperatively the by Podiatry  No further testing needed today  2  Esophagitis, reflux  Patient's symptoms appear stable  Continue with current treatment of omeprazole  - omeprazole (PriLOSEC) 40 MG capsule; Take 1 capsule (40 mg total) by mouth every 12 (twelve) hours  Dispense: 60 capsule; Refill: 5    3  Right foot pain  Patient has been having persistent pain  He may continue with use of anti-inflammatories however this will cause gastric irritation  At this time dot start treatment gabapentin   - gabapentin (NEURONTIN) 300 mg capsule; Take 1 capsule (300 mg total) by mouth daily at bedtime  Dispense: 30 capsule; Refill: 2     Diagnoses and all orders for this visit:    Esophagitis, reflux  -     omeprazole (PriLOSEC) 40 MG capsule; Take by mouth    Other orders  -     omeprazole (PriLOSEC) 40 MG capsule; Take by mouth  -     HYDROcodone-acetaminophen (NORCO) 5-325 mg per tablet; Take 1 tablet by mouth  -     cyclobenzaprine (AMRIX) 15 MG 24 hr capsule; Take 15 mg by mouth  -     cetirizine (ZyrTEC) 10 mg tablet; Take 10 mg by mouth  -     clindamycin (CLEOCIN T) 1 % external solution; Apply topically 2 (two) times a day  -     minocycline (MINOCIN) 100 mg capsule;           Subjective:    Chief Complaint   Patient presents with    Pre-op Exam        Patient ID: Shari Jasmine is a 50 y o  male      Elen Lang Natasha  50 y o   male    SURGEON:Dr Leo Nevarez    SURGERY/PROCEDURE: right foot gastrocnemius recession, 2nd and 3rd interdigital space exploration    DATE OF SURGERY: 4/20/18    PRIOR ANESTHESIA:yes    COMPLICATION: no    BLEEDING PROBLEM: no    PERTINENT PMH: no    EXERCISE CAPACITY:   CAN WALK 4 BLOCKS AND OR CLIMB 2 FLIGHTS: Yes    HOME LIVING SITUATION SAFE AND SECURE: Yes      TOBACCO: no     ETOH: yes     ILLEGAL DRUGS: no        Review of Systems   Constitutional: Negative for activity change, chills, fatigue and fever  HENT: Negative for congestion, ear pain, sinus pressure and sore throat  Eyes: Negative for redness, itching and visual disturbance  Respiratory: Negative for cough and shortness of breath  Cardiovascular: Negative for chest pain and palpitations  Gastrointestinal: Negative for abdominal pain, diarrhea and nausea  Endocrine: Negative for cold intolerance and heat intolerance  Genitourinary: Negative for dysuria, flank pain and frequency  Musculoskeletal: Negative for arthralgias, back pain, gait problem and myalgias  Skin: Negative for color change  Allergic/Immunologic: Negative for environmental allergies  Neurological: Negative for dizziness, numbness and headaches  Psychiatric/Behavioral: Negative for behavioral problems and sleep disturbance  The following portions of the patient's history were reviewed and updated as appropriate : past family history, past medical history, past social history and past surgical history  Objective:    Vitals:    03/27/18 1658   BP: 140/88   BP Location: Left arm   Patient Position: Sitting   Cuff Size: Adult   Pulse: 80   Resp: 15   Temp: 98 5 °F (36 9 °C)   TempSrc: Tympanic   SpO2: 98%   Weight: 98 7 kg (217 lb 9 6 oz)        Physical Exam   Constitutional: He is oriented to person, place, and time  He appears well-developed and well-nourished  HENT:   Head: Normocephalic and atraumatic  Nose: Nose normal    Mouth/Throat: No oropharyngeal exudate  Eyes: Pupils are equal, round, and reactive to light  Right eye exhibits no discharge  Left eye exhibits no discharge     Neck: Normal range of motion  Neck supple  No tracheal deviation present  Cardiovascular: Normal rate, regular rhythm and intact distal pulses  Exam reveals no gallop and no friction rub  No murmur heard  Pulses:       Dorsalis pedis pulses are 2+ on the right side, and 2+ on the left side  Posterior tibial pulses are 2+ on the right side, and 2+ on the left side  Pulmonary/Chest: Effort normal and breath sounds normal  No respiratory distress  He has no wheezes  He has no rales  Abdominal: Soft  Bowel sounds are normal  He exhibits no distension  There is no tenderness  There is no rebound and no guarding  Musculoskeletal: Normal range of motion  He exhibits no edema  Lymphadenopathy:        Head (right side): No submental and no submandibular adenopathy present  Head (left side): No submental and no submandibular adenopathy present  He has no cervical adenopathy  Right cervical: No superficial cervical, no deep cervical and no posterior cervical adenopathy present  Left cervical: No superficial cervical, no deep cervical and no posterior cervical adenopathy present  Neurological: He is alert and oriented to person, place, and time  No cranial nerve deficit or sensory deficit  Skin: Skin is warm, dry and intact  Psychiatric: His speech is normal and behavior is normal  Judgment normal  His mood appears not anxious  Cognition and memory are normal  He does not exhibit a depressed mood  Vitals reviewed

## 2018-04-24 ENCOUNTER — TELEPHONE (OUTPATIENT)
Dept: FAMILY MEDICINE CLINIC | Facility: CLINIC | Age: 48
End: 2018-04-24

## 2018-04-24 ENCOUNTER — APPOINTMENT (OUTPATIENT)
Dept: PHYSICAL THERAPY | Facility: MEDICAL CENTER | Age: 48
End: 2018-04-24
Payer: COMMERCIAL

## 2018-04-24 NOTE — PROGRESS NOTES
Patient called to request D/C at this time due to recent foot surgery and need to focus on that injury at this time

## 2018-04-24 NOTE — TELEPHONE ENCOUNTER
Pt's spouse Tawny called the office pt had surgery on 4/20/2018 on his right leg through [facility]  Pt is on the blood thinner xarelto  Pt noticed that he has little red bumps on his chest that are scattered and red in color for example like blood they are raised  Not itchy or no pain is reported  Pt's spouse Agata Bal is on his communication consent  Pt's spouse believes that it could be related to his blood thinner

## 2018-04-24 NOTE — TELEPHONE ENCOUNTER
This most likely could be secondary to his use of Xarelto  At this time, if he is not developing a erythematous or itchy rash, he may continue to monitor  If any symptoms are worsening, please have a follow-up    Thank you

## 2018-05-24 ENCOUNTER — HOSPITAL ENCOUNTER (OUTPATIENT)
Dept: CT IMAGING | Facility: HOSPITAL | Age: 48
Discharge: HOME | End: 2018-05-24
Payer: COMMERCIAL

## 2018-05-24 ENCOUNTER — OFFICE VISIT (OUTPATIENT)
Dept: FAMILY MEDICINE CLINIC | Facility: CLINIC | Age: 48
End: 2018-05-24
Payer: COMMERCIAL

## 2018-05-24 ENCOUNTER — TELEPHONE (OUTPATIENT)
Dept: FAMILY MEDICINE CLINIC | Facility: CLINIC | Age: 48
End: 2018-05-24

## 2018-05-24 VITALS
SYSTOLIC BLOOD PRESSURE: 136 MMHG | DIASTOLIC BLOOD PRESSURE: 70 MMHG | WEIGHT: 217.2 LBS | RESPIRATION RATE: 16 BRPM | TEMPERATURE: 99.4 F | HEART RATE: 81 BPM | OXYGEN SATURATION: 94 % | HEIGHT: 68 IN | BODY MASS INDEX: 32.92 KG/M2

## 2018-05-24 DIAGNOSIS — R51.9 NEW ONSET HEADACHE: ICD-10-CM

## 2018-05-24 DIAGNOSIS — G44.209 TENSION-TYPE HEADACHE, NOT INTRACTABLE, UNSPECIFIED CHRONICITY PATTERN: Primary | ICD-10-CM

## 2018-05-24 DIAGNOSIS — M79.12 STERNOCLEIDOMASTOID MUSCLE TENDERNESS: ICD-10-CM

## 2018-05-24 PROBLEM — M79.18 CERVICAL MYOFASCIAL PAIN SYNDROME: Status: ACTIVE | Noted: 2017-10-31

## 2018-05-24 PROBLEM — G44.309 POST-TRAUMATIC HEADACHE: Status: ACTIVE | Noted: 2017-08-18

## 2018-05-24 PROBLEM — M26.629 TMJ ARTHRALGIA: Status: ACTIVE | Noted: 2017-08-31

## 2018-05-24 PROCEDURE — 99214 OFFICE O/P EST MOD 30 MIN: CPT | Performed by: FAMILY MEDICINE

## 2018-05-24 PROCEDURE — 70450 CT HEAD/BRAIN W/O DYE: CPT

## 2018-05-24 RX ORDER — PREDNISONE 20 MG/1
60 TABLET ORAL DAILY
Qty: 15 TABLET | Refills: 0 | Status: SHIPPED | OUTPATIENT
Start: 2018-05-24 | End: 2019-11-20 | Stop reason: ALTCHOICE

## 2018-05-24 RX ORDER — METHYLPREDNISOLONE 4 MG/1
TABLET ORAL
COMMUNITY
Start: 2018-04-27 | End: 2019-11-20 | Stop reason: ALTCHOICE

## 2018-05-24 RX ORDER — RIVAROXABAN 10 MG/1
1 TABLET, FILM COATED ORAL DAILY
COMMUNITY
Start: 2018-05-17 | End: 2018-12-21

## 2018-05-24 NOTE — TELEPHONE ENCOUNTER
Pt's spouse Tawny called requesting na out of work note for Edith Dos Santos for today  I will email it to [email or web address]  net  Pt's out of work was emailed on  5/24/2018  [email or web address]  net per pt's spouse

## 2018-08-16 ENCOUNTER — OFFICE VISIT (OUTPATIENT)
Dept: FAMILY MEDICINE CLINIC | Facility: CLINIC | Age: 48
End: 2018-08-16
Payer: COMMERCIAL

## 2018-08-16 VITALS
BODY MASS INDEX: 32.22 KG/M2 | SYSTOLIC BLOOD PRESSURE: 150 MMHG | RESPIRATION RATE: 15 BRPM | TEMPERATURE: 98.1 F | DIASTOLIC BLOOD PRESSURE: 70 MMHG | HEIGHT: 68 IN | OXYGEN SATURATION: 98 % | HEART RATE: 79 BPM | WEIGHT: 212.6 LBS

## 2018-08-16 DIAGNOSIS — R35.0 URINARY FREQUENCY: Primary | ICD-10-CM

## 2018-08-16 DIAGNOSIS — M54.50 ACUTE LOW BACK PAIN WITHOUT SCIATICA, UNSPECIFIED BACK PAIN LATERALITY: ICD-10-CM

## 2018-08-16 LAB
BASOPHILS # BLD AUTO: 0.07 THOUSANDS/ΜL (ref 0–0.1)
BASOPHILS NFR BLD AUTO: 1 % (ref 0–1)
EOSINOPHIL # BLD AUTO: 0.12 THOUSAND/ΜL (ref 0–0.61)
EOSINOPHIL NFR BLD AUTO: 1 % (ref 0–6)
ERYTHROCYTE [DISTWIDTH] IN BLOOD BY AUTOMATED COUNT: 12.8 % (ref 11.6–15.1)
HCT VFR BLD AUTO: 44.4 % (ref 36.5–49.3)
HGB BLD-MCNC: 14 G/DL (ref 12–17)
IMM GRANULOCYTES # BLD AUTO: 0.02 THOUSAND/UL (ref 0–0.2)
IMM GRANULOCYTES NFR BLD AUTO: 0 % (ref 0–2)
LYMPHOCYTES # BLD AUTO: 1.45 THOUSANDS/ΜL (ref 0.6–4.47)
LYMPHOCYTES NFR BLD AUTO: 17 % (ref 14–44)
MCH RBC QN AUTO: 30.8 PG (ref 26.8–34.3)
MCHC RBC AUTO-ENTMCNC: 31.5 G/DL (ref 31.4–37.4)
MCV RBC AUTO: 98 FL (ref 82–98)
MONOCYTES # BLD AUTO: 1.1 THOUSAND/ΜL (ref 0.17–1.22)
MONOCYTES NFR BLD AUTO: 13 % (ref 4–12)
NEUTROPHILS # BLD AUTO: 5.8 THOUSANDS/ΜL (ref 1.85–7.62)
NEUTS SEG NFR BLD AUTO: 68 % (ref 43–75)
NRBC BLD AUTO-RTO: 0 /100 WBCS
PLATELET # BLD AUTO: 243 THOUSANDS/UL (ref 149–390)
PMV BLD AUTO: 10.9 FL (ref 8.9–12.7)
PSA SERPL-MCNC: 8.1 NG/ML (ref 0–4)
RBC # BLD AUTO: 4.55 MILLION/UL (ref 3.88–5.62)
SL AMB  POCT GLUCOSE, UA: NEGATIVE
SL AMB LEUKOCYTE ESTERASE,UA: NEGATIVE
SL AMB POCT BILIRUBIN,UA: NEGATIVE
SL AMB POCT BLOOD,UA: NEGATIVE
SL AMB POCT CLARITY,UA: CLEAR
SL AMB POCT COLOR,UA: YELLOW
SL AMB POCT KETONES,UA: NEGATIVE
SL AMB POCT NITRITE,UA: NEGATIVE
SL AMB POCT PH,UA: 5.5
SL AMB POCT SPECIFIC GRAVITY,UA: 1.03
SL AMB POCT URINE PROTEIN: NORMAL
SL AMB POCT UROBILINOGEN: 0.2
WBC # BLD AUTO: 8.56 THOUSAND/UL (ref 4.31–10.16)

## 2018-08-16 PROCEDURE — 84153 ASSAY OF PSA TOTAL: CPT | Performed by: FAMILY MEDICINE

## 2018-08-16 PROCEDURE — 85025 COMPLETE CBC W/AUTO DIFF WBC: CPT | Performed by: FAMILY MEDICINE

## 2018-08-16 PROCEDURE — 99214 OFFICE O/P EST MOD 30 MIN: CPT | Performed by: FAMILY MEDICINE

## 2018-08-16 PROCEDURE — 80053 COMPREHEN METABOLIC PANEL: CPT | Performed by: FAMILY MEDICINE

## 2018-08-16 PROCEDURE — 81003 URINALYSIS AUTO W/O SCOPE: CPT | Performed by: FAMILY MEDICINE

## 2018-08-16 PROCEDURE — 36415 COLL VENOUS BLD VENIPUNCTURE: CPT | Performed by: FAMILY MEDICINE

## 2018-08-16 RX ORDER — CIPROFLOXACIN 500 MG/1
500 TABLET, FILM COATED ORAL EVERY 12 HOURS SCHEDULED
Qty: 20 TABLET | Refills: 0 | Status: SHIPPED | OUTPATIENT
Start: 2018-08-16 | End: 2018-08-26

## 2018-08-16 NOTE — PROGRESS NOTES
Assessment/Plan:   1  Urinary frequencyAcute low back pain without sciatica, unspecified back pain laterality/  Unclear as to the exact cause of patient's symptoms today  His symptoms appear concerning for possible prostate pathology such as prostatitis  His urinalysis was negative today  Check blood work including CBC and PSA  Will start treatment empirically with Cipro 500 mg b i d  for the next 2 weeks  If any symptoms should worsen, he was advised to present dot  - POCT urine dip  - PSA Total, Diagnostic; Future  - ciprofloxacin (CIPRO) 500 mg tablet; Take 1 tablet (500 mg total) by mouth every 12 (twelve) hours for 10 days  Dispense: 20 tablet; Refill: 0  - CBC and differential  - Comprehensive metabolic panel  - PSA Total, Diagnostic       Diagnoses and all orders for this visit:    Symptoms of urinary tract infection  -     POCT urine dip          Subjective:    Chief Complaint   Patient presents with    Urinary Tract Infection        Patient ID: Rosa Vallejo is a 50 y o  male  Patient is a 78-year-old man presents today with urinary frequency of the for the past few days  Symptoms started gradually  He states that he has not been noticing any burning pain however has been having problems with urinary frequency  He states that he has only producing a small amount of urine and he is straining to get the urine out  He denies any fevers or chills at this time  Review of Systems   Constitutional: Negative for activity change, chills, fatigue and fever  HENT: Negative for congestion, ear pain, sinus pressure and sore throat  Eyes: Negative for redness, itching and visual disturbance  Respiratory: Negative for cough and shortness of breath  Cardiovascular: Negative for chest pain and palpitations  Gastrointestinal: Negative for abdominal pain, diarrhea and nausea  Endocrine: Negative for cold intolerance and heat intolerance     Genitourinary: Negative for dysuria, flank pain and frequency  Musculoskeletal: Negative for arthralgias, back pain, gait problem and myalgias  Skin: Negative for color change  Allergic/Immunologic: Negative for environmental allergies  Neurological: Negative for dizziness, numbness and headaches  Psychiatric/Behavioral: Negative for behavioral problems and sleep disturbance  The following portions of the patient's history were reviewed and updated as appropriate : past family history, past medical history, past social history and past surgical history        Current Outpatient Prescriptions:     Aspirin-Acetaminophen-Caffeine (EXCEDRIN PO), Take 2 tablets by mouth as needed, Disp: , Rfl:     cetirizine (ZyrTEC) 10 mg tablet, Take 10 mg by mouth as needed  , Disp: , Rfl:     clindamycin (CLEOCIN T) 1 % external solution, Apply topically 2 (two) times a day, Disp: , Rfl:     cyclobenzaprine (AMRIX) 15 MG 24 hr capsule, Take 15 mg by mouth daily as needed  , Disp: , Rfl:     ibuprofen (MOTRIN) 200 mg tablet, Take 800 mg by mouth every 6 (six) hours as needed for mild pain, Disp: , Rfl:     Multiple Vitamin (MULTIVITAMIN) capsule, Take 1 capsule by mouth daily, Disp: , Rfl:     omeprazole (PriLOSEC) 40 MG capsule, Take 1 capsule (40 mg total) by mouth every 12 (twelve) hours, Disp: 60 capsule, Rfl: 5    Probiotic Product (PROBIOTIC ADVANCED PO), Take by mouth daily, Disp: , Rfl:     Methylprednisolone 4 MG TBPK, , Disp: , Rfl:     predniSONE 20 mg tablet, Take 3 tablets (60 mg total) by mouth daily (Patient not taking: Reported on 8/16/2018 ), Disp: 15 tablet, Rfl: 0    XARELTO 10 MG tablet, Take 1 tablet by mouth daily, Disp: , Rfl:     Objective:    Vitals:    08/16/18 1038   BP: 150/70   BP Location: Left arm   Patient Position: Sitting   Cuff Size: Adult   Pulse: 79   Resp: 15   Temp: 98 1 °F (36 7 °C)   TempSrc: Tympanic   SpO2: 98%   Weight: 96 4 kg (212 lb 9 6 oz)   Height: 5' 8" (1 727 m)        Physical Exam   Constitutional: He is oriented to person, place, and time  He appears well-developed and well-nourished  HENT:   Head: Normocephalic and atraumatic  Nose: Nose normal    Mouth/Throat: No oropharyngeal exudate  Eyes: Pupils are equal, round, and reactive to light  Right eye exhibits no discharge  Left eye exhibits no discharge  Neck: Normal range of motion  Neck supple  No tracheal deviation present  Cardiovascular: Normal rate, regular rhythm and intact distal pulses  Exam reveals no gallop and no friction rub  No murmur heard  Pulses:       Dorsalis pedis pulses are 2+ on the right side, and 2+ on the left side  Posterior tibial pulses are 2+ on the right side, and 2+ on the left side  Pulmonary/Chest: Effort normal and breath sounds normal  No respiratory distress  He has no wheezes  He has no rales  Abdominal: Soft  Bowel sounds are normal  He exhibits no distension  There is no tenderness  There is no rebound and no guarding  Musculoskeletal: Normal range of motion  He exhibits no edema  Lymphadenopathy:        Head (right side): No submental and no submandibular adenopathy present  Head (left side): No submental and no submandibular adenopathy present  He has no cervical adenopathy  Right cervical: No superficial cervical, no deep cervical and no posterior cervical adenopathy present  Left cervical: No superficial cervical, no deep cervical and no posterior cervical adenopathy present  Neurological: He is alert and oriented to person, place, and time  No cranial nerve deficit or sensory deficit  Skin: Skin is warm, dry and intact  Psychiatric: His speech is normal and behavior is normal  Judgment normal  His mood appears not anxious  Cognition and memory are normal  He does not exhibit a depressed mood  Vitals reviewed

## 2018-08-17 LAB
ALBUMIN SERPL BCP-MCNC: 4 G/DL (ref 3.5–5)
ALP SERPL-CCNC: 89 U/L (ref 46–116)
ALT SERPL W P-5'-P-CCNC: 54 U/L (ref 12–78)
ANION GAP SERPL CALCULATED.3IONS-SCNC: 8 MMOL/L (ref 4–13)
AST SERPL W P-5'-P-CCNC: 42 U/L (ref 5–45)
BILIRUB SERPL-MCNC: 0.68 MG/DL (ref 0.2–1)
BUN SERPL-MCNC: 13 MG/DL (ref 5–25)
CALCIUM SERPL-MCNC: 8.6 MG/DL (ref 8.3–10.1)
CHLORIDE SERPL-SCNC: 108 MMOL/L (ref 100–108)
CO2 SERPL-SCNC: 26 MMOL/L (ref 21–32)
CREAT SERPL-MCNC: 0.93 MG/DL (ref 0.6–1.3)
GFR SERPL CREATININE-BSD FRML MDRD: 96 ML/MIN/1.73SQ M
GLUCOSE SERPL-MCNC: 82 MG/DL (ref 65–140)
POTASSIUM SERPL-SCNC: 4 MMOL/L (ref 3.5–5.3)
PROT SERPL-MCNC: 6.8 G/DL (ref 6.4–8.2)
SODIUM SERPL-SCNC: 142 MMOL/L (ref 136–145)

## 2018-08-18 ENCOUNTER — TELEPHONE (OUTPATIENT)
Dept: FAMILY MEDICINE CLINIC | Facility: CLINIC | Age: 48
End: 2018-08-18
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
## 2018-08-20 ENCOUNTER — TELEPHONE (OUTPATIENT)
Dept: FAMILY MEDICINE CLINIC | Facility: CLINIC | Age: 48
End: 2018-08-20

## 2018-08-20 NOTE — TELEPHONE ENCOUNTER
Pt wife called stating you send pt to the emergency room on Friday Due to a urinary problem  He stated he had a abnormal test  Shes requesting a call back from you

## 2018-08-22 DIAGNOSIS — R74.8 ELEVATED LIPASE: Primary | ICD-10-CM

## 2018-08-22 NOTE — TELEPHONE ENCOUNTER
Carole Mandel pt's spouse Tawny called returning your message  I informed Tawny you were currently in a room seeing a patient  If you can please call pt's spouse Tawny at her home number she is home now if possible can you call before you leave for today

## 2018-08-22 NOTE — TELEPHONE ENCOUNTER
I spoke with patients  Wife  Jaclyn Alvarez was seen at [facility] ER due to possible prostatitis  His workup revealed elevated lipase level of 500  He was not given any instructions on what to do with this  He is not experiencing any nausea or vomiting  No jaundice  No abdominal pain  I would recommend repeating lipase level ( along with CMP)    I will call with results and next at

## 2018-08-23 ENCOUNTER — APPOINTMENT (OUTPATIENT)
Dept: LAB | Facility: MEDICAL CENTER | Age: 48
End: 2018-08-23
Payer: COMMERCIAL

## 2018-08-23 DIAGNOSIS — R74.8 ELEVATED LIPASE: ICD-10-CM

## 2018-08-23 LAB
ALBUMIN SERPL BCP-MCNC: 3.7 G/DL (ref 3.5–5)
ALP SERPL-CCNC: 81 U/L (ref 46–116)
ALT SERPL W P-5'-P-CCNC: 47 U/L (ref 12–78)
ANION GAP SERPL CALCULATED.3IONS-SCNC: 7 MMOL/L (ref 4–13)
AST SERPL W P-5'-P-CCNC: 27 U/L (ref 5–45)
BILIRUB SERPL-MCNC: 0.62 MG/DL (ref 0.2–1)
BUN SERPL-MCNC: 12 MG/DL (ref 5–25)
CALCIUM SERPL-MCNC: 8.3 MG/DL (ref 8.3–10.1)
CHLORIDE SERPL-SCNC: 107 MMOL/L (ref 100–108)
CO2 SERPL-SCNC: 26 MMOL/L (ref 21–32)
CREAT SERPL-MCNC: 1.02 MG/DL (ref 0.6–1.3)
GFR SERPL CREATININE-BSD FRML MDRD: 86 ML/MIN/1.73SQ M
GLUCOSE SERPL-MCNC: 101 MG/DL (ref 65–140)
LIPASE SERPL-CCNC: 190 U/L (ref 73–393)
POTASSIUM SERPL-SCNC: 3.6 MMOL/L (ref 3.5–5.3)
PROT SERPL-MCNC: 7.1 G/DL (ref 6.4–8.2)
SODIUM SERPL-SCNC: 140 MMOL/L (ref 136–145)

## 2018-08-23 PROCEDURE — 83690 ASSAY OF LIPASE: CPT

## 2018-08-23 PROCEDURE — 36415 COLL VENOUS BLD VENIPUNCTURE: CPT

## 2018-08-23 PROCEDURE — 80053 COMPREHEN METABOLIC PANEL: CPT

## 2018-08-27 NOTE — TELEPHONE ENCOUNTER
I spoke  During emergency room visit, for prostatitis, patient was told he has lipase level was 500  I advised wife that we should repeat this level along with CMP  Repeat labs were performed and are within normal limits    Patient and wife advised to call if further problems

## 2018-10-30 ENCOUNTER — TELEPHONE (OUTPATIENT)
Dept: FAMILY MEDICINE CLINIC | Facility: CLINIC | Age: 48
End: 2018-10-30

## 2018-10-30 DIAGNOSIS — M26.623 BILATERAL TEMPOROMANDIBULAR JOINT PAIN: Primary | ICD-10-CM

## 2018-10-30 NOTE — TELEPHONE ENCOUNTER
Called pt to schedule follow up from ER  Pt stated at this time he doesn't think he needs to come in for a follow up  However he did have a question  Dr Jen Arshad had given him a script for therapy for his TMJ, while going to therapy they told him he can't have therapy for 2 things at the same time (specialist had sent him to therapy for his foot/ankle)  Pt stated his therapy for the foot and ankle are now completed, so he would like to continue with the therapy for TMJ, does he need a new script or is he able to use the previous one placed   Pt will wait to hear from us before scheduling therapy, he can be reached at [phone]

## 2018-10-30 NOTE — TELEPHONE ENCOUNTER
----- Message from [employer] sent at 10/30/2018  8:24 AM EDT -----  Regarding: ER Follow Up  [facility] ER 10/29/18 for Laceration of left index finger without foreign body without damage to nail, initial encounter

## 2018-11-27 ENCOUNTER — EVALUATION (OUTPATIENT)
Dept: PHYSICAL THERAPY | Facility: MEDICAL CENTER | Age: 48
End: 2018-11-27
Payer: COMMERCIAL

## 2018-11-27 DIAGNOSIS — G44.86 CERVICOGENIC HEADACHE: ICD-10-CM

## 2018-11-27 DIAGNOSIS — G89.29 CHRONIC NECK PAIN: Primary | ICD-10-CM

## 2018-11-27 DIAGNOSIS — G44.229 CHRONIC TENSION-TYPE HEADACHE, NOT INTRACTABLE: ICD-10-CM

## 2018-11-27 DIAGNOSIS — M54.2 CHRONIC NECK PAIN: Primary | ICD-10-CM

## 2018-11-27 DIAGNOSIS — M26.623 BILATERAL TEMPOROMANDIBULAR JOINT PAIN: ICD-10-CM

## 2018-11-27 PROCEDURE — G8990 OTHER PT/OT CURRENT STATUS: HCPCS | Performed by: PHYSICAL THERAPIST

## 2018-11-27 PROCEDURE — 97140 MANUAL THERAPY 1/> REGIONS: CPT | Performed by: PHYSICAL THERAPIST

## 2018-11-27 PROCEDURE — 97112 NEUROMUSCULAR REEDUCATION: CPT | Performed by: PHYSICAL THERAPIST

## 2018-11-27 PROCEDURE — G8991 OTHER PT/OT GOAL STATUS: HCPCS | Performed by: PHYSICAL THERAPIST

## 2018-11-27 PROCEDURE — 97163 PT EVAL HIGH COMPLEX 45 MIN: CPT | Performed by: PHYSICAL THERAPIST
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
## 2018-11-28 ENCOUNTER — TELEPHONE (OUTPATIENT)
Dept: FAMILY MEDICINE CLINIC | Facility: CLINIC | Age: 48
End: 2018-11-28

## 2018-11-28 NOTE — TELEPHONE ENCOUNTER
Pt called would like a referral to go back to dr Kelvin Almanza for trigger point injections pls call when ready

## 2018-12-04 ENCOUNTER — OFFICE VISIT (OUTPATIENT)
Dept: PHYSICAL THERAPY | Facility: MEDICAL CENTER | Age: 48
End: 2018-12-04
Payer: COMMERCIAL

## 2018-12-04 DIAGNOSIS — G44.229 CHRONIC TENSION-TYPE HEADACHE, NOT INTRACTABLE: ICD-10-CM

## 2018-12-04 DIAGNOSIS — G89.29 CHRONIC NECK PAIN: Primary | ICD-10-CM

## 2018-12-04 DIAGNOSIS — M51.36 DDD (DEGENERATIVE DISC DISEASE), LUMBAR: ICD-10-CM

## 2018-12-04 DIAGNOSIS — G44.86 CERVICOGENIC HEADACHE: ICD-10-CM

## 2018-12-04 DIAGNOSIS — M26.623 BILATERAL TEMPOROMANDIBULAR JOINT PAIN: ICD-10-CM

## 2018-12-04 DIAGNOSIS — M54.2 CHRONIC NECK PAIN: Primary | ICD-10-CM

## 2018-12-04 DIAGNOSIS — M79.18 CERVICAL MYOFASCIAL PAIN SYNDROME: Primary | ICD-10-CM

## 2018-12-04 PROBLEM — M51.369 DDD (DEGENERATIVE DISC DISEASE), LUMBAR: Status: ACTIVE | Noted: 2018-12-04

## 2018-12-04 PROCEDURE — 97140 MANUAL THERAPY 1/> REGIONS: CPT | Performed by: PHYSICAL THERAPIST

## 2018-12-04 PROCEDURE — 97112 NEUROMUSCULAR REEDUCATION: CPT | Performed by: PHYSICAL THERAPIST

## 2018-12-04 NOTE — PROGRESS NOTES
Daily Note     Today's date: 2018  Patient name: Echo Cabrera  : 1970  MRN: [id number]  Referring provider: Salma Colindres DO  Dx:   Encounter Diagnosis     ICD-10-CM    1  Chronic neck pain M54 2     G89 29    2  Cervicogenic headache R51    3  Chronic tension-type headache, not intractable G44 229    4  Bilateral temporomandibular joint pain M26 623                   Subjective: Patient reports headaches are exacerbated most by R UE activity  Objective: See treatment diary below      Assessment:   1) sternoclavicular hypertonicity - addressing with neuromotor retraining   2) scapular dyskinesis - addressing with neuromotor retraining   3) poor TMJ movement coordination - addressing with neuromotor retraining   4) upper cervical hypomobility - addressing with mobs and mobility exercises   5) poor cervicothoracic movement coordination - addressing with functional retraining  6) poor postural control - addressing with neuromotor retraining     Goals  Patient will be independent with home exercise program  - in progress  Patient will be able to manage symptoms independently  - in progress  Patient will be able to chew without limitation due to pain  - in progress  Patient will be able to turn to look over a shoulder to back out of a parking space without limitation due to pain  - in progress  Patient will be able to yawn without limitation due to pain  - in progress    Plan: Continue per plan of care   2x/wk      Precautions: depression    Daily Treatment Diary     Date:             Manual              Supine UPA to cervical spine SK SK           SOR/MFR SK SK                                     Active/  Assistive Interventions              TMJ relaxed position 10 10           TMJ controlled opening 10 10           Cervical rotation 10 10           Supine capital flexion  5x:05           Scapular clocks  10 Modalities                10' 10'

## 2018-12-07 ENCOUNTER — OFFICE VISIT (OUTPATIENT)
Dept: PHYSICAL THERAPY | Facility: MEDICAL CENTER | Age: 48
End: 2018-12-07
Payer: COMMERCIAL

## 2018-12-07 DIAGNOSIS — M54.2 CHRONIC NECK PAIN: Primary | ICD-10-CM

## 2018-12-07 DIAGNOSIS — G44.86 CERVICOGENIC HEADACHE: ICD-10-CM

## 2018-12-07 DIAGNOSIS — G89.29 CHRONIC NECK PAIN: Primary | ICD-10-CM

## 2018-12-07 DIAGNOSIS — M26.623 BILATERAL TEMPOROMANDIBULAR JOINT PAIN: ICD-10-CM

## 2018-12-07 DIAGNOSIS — G44.229 CHRONIC TENSION-TYPE HEADACHE, NOT INTRACTABLE: ICD-10-CM

## 2018-12-07 PROCEDURE — 97140 MANUAL THERAPY 1/> REGIONS: CPT | Performed by: PHYSICAL THERAPIST

## 2018-12-10 ENCOUNTER — OFFICE VISIT (OUTPATIENT)
Dept: PHYSICAL THERAPY | Facility: MEDICAL CENTER | Age: 48
End: 2018-12-10
Payer: COMMERCIAL

## 2018-12-10 DIAGNOSIS — M26.623 BILATERAL TEMPOROMANDIBULAR JOINT PAIN: ICD-10-CM

## 2018-12-10 DIAGNOSIS — G89.29 CHRONIC NECK PAIN: Primary | ICD-10-CM

## 2018-12-10 DIAGNOSIS — G44.229 CHRONIC TENSION-TYPE HEADACHE, NOT INTRACTABLE: ICD-10-CM

## 2018-12-10 DIAGNOSIS — M54.2 CHRONIC NECK PAIN: Primary | ICD-10-CM

## 2018-12-10 DIAGNOSIS — G44.86 CERVICOGENIC HEADACHE: ICD-10-CM

## 2018-12-10 PROCEDURE — 97140 MANUAL THERAPY 1/> REGIONS: CPT | Performed by: PHYSICAL THERAPIST

## 2018-12-10 NOTE — PROGRESS NOTES
Daily Note     Today's date: 12/10/2018  Patient name: Aurora Mark  : 1970  MRN: [id number]  Referring provider: Kervin Alexis DO  Dx:   Encounter Diagnosis     ICD-10-CM    1  Chronic neck pain M54 2     G89 29    2  Cervicogenic headache R51    3  Chronic tension-type headache, not intractable G44 229    4  Bilateral temporomandibular joint pain M26 623                   Subjective: Demarco Hale reports he is having a lot of family stress lately which he notes makes his symptoms worse  Objective: See treatment diary below  Assessment:   1) sternoclavicular hypertonicity - addressing with neuromotor retraining   2) scapular dyskinesis - addressing with neuromotor retraining   3) poor TMJ movement coordination - addressing with neuromotor retraining   4) upper cervical hypomobility - addressing with mobs and mobility exercises   5) poor cervicothoracic movement coordination - addressing with functional retraining  6) poor postural control - addressing with neuromotor retraining     Goals  Patient will be independent with home exercise program  - in progress  Patient will be able to manage symptoms independently  - in progress  Patient will be able to chew without limitation due to pain  - in progress  Patient will be able to turn to look over a shoulder to back out of a parking space without limitation due to pain  - in progress  Patient will be able to yawn without limitation due to pain  - in progress    Plan: Continue per plan of care   2x/wk      Precautions: depression    Daily Treatment Diary     Date:            Manual              Supine UPA to cervical spine SK SK AF          SOR/MFR SK SK AF                                    Active/  Assistive Interventions              TMJ relaxed position 10 10           TMJ controlled opening 10 10           Cervical rotation 10 10           Supine capital flexion  5x:05           Scapular clocks  10 Modalities                10' 10' 10

## 2018-12-11 ENCOUNTER — TELEPHONE (OUTPATIENT)
Dept: PAIN MEDICINE | Facility: MEDICAL CENTER | Age: 48
End: 2018-12-11

## 2018-12-11 NOTE — TELEPHONE ENCOUNTER
Patient is scheduled with [facility] 12/21

## 2018-12-11 NOTE — TELEPHONE ENCOUNTER
--please call pt and schedule him with [facility] for 30 minutes to be re evaluated and have TPI's if indicated-

## 2018-12-11 NOTE — PROGRESS NOTES
Daily Note     Today's date: 12/10/2018  Patient name: Flor Rooney  : 1970  MRN: [id number]  Referring provider: Zeina Vogel DO  Dx:   Encounter Diagnosis     ICD-10-CM    1  Chronic neck pain M54 2     G89 29    2  Cervicogenic headache R51    3  Chronic tension-type headache, not intractable G44 229    4  Bilateral temporomandibular joint pain M26 623                   Subjective: Edgard Ayers reports that he is having more pain in right anterior shoulder  He did receive a massage a few days ago and notes pain got worse afterwards       Objective: See treatment diary below  Assessment:   1) sternoclavicular hypertonicity - addressing with neuromotor retraining   2) scapular dyskinesis - addressing with neuromotor retraining   3) poor TMJ movement coordination - addressing with neuromotor retraining   4) upper cervical hypomobility - addressing with mobs and mobility exercises   5) poor cervicothoracic movement coordination - addressing with functional retraining  6) poor postural control - addressing with neuromotor retraining     Goals  Patient will be independent with home exercise program  - in progress  Patient will be able to manage symptoms independently  - in progress  Patient will be able to chew without limitation due to pain  - in progress  Patient will be able to turn to look over a shoulder to back out of a parking space without limitation due to pain  - in progress  Patient will be able to yawn without limitation due to pain  - in progress    Plan: Continue per plan of care   2x/wk      Precautions: depression    Daily Treatment Diary     Date:   12 12/10         Manual              Supine UPA to cervical spine SK SK AF AF         SOR/MFR SK SK AF AF                                   Active/  Assistive Interventions              TMJ relaxed position 10 10           TMJ controlled opening 10 10           Cervical rotation 10 10           Supine capital flexion  5x:05 Scapular clocks  10                                                                                                                   Modalities              MH  10' 10' 10 10'
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
## 2018-12-11 NOTE — TELEPHONE ENCOUNTER
Call from patient   Call back # [phone]  Marylen Begun      Patient would like to know if he can have another injection?

## 2018-12-14 ENCOUNTER — OFFICE VISIT (OUTPATIENT)
Dept: PHYSICAL THERAPY | Facility: MEDICAL CENTER | Age: 48
End: 2018-12-14
Payer: COMMERCIAL

## 2018-12-14 DIAGNOSIS — M26.623 BILATERAL TEMPOROMANDIBULAR JOINT PAIN: ICD-10-CM

## 2018-12-14 DIAGNOSIS — M54.2 CHRONIC NECK PAIN: Primary | ICD-10-CM

## 2018-12-14 DIAGNOSIS — G89.29 CHRONIC NECK PAIN: Primary | ICD-10-CM

## 2018-12-14 DIAGNOSIS — G44.86 CERVICOGENIC HEADACHE: ICD-10-CM

## 2018-12-14 DIAGNOSIS — G44.229 CHRONIC TENSION-TYPE HEADACHE, NOT INTRACTABLE: ICD-10-CM

## 2018-12-14 PROCEDURE — 97140 MANUAL THERAPY 1/> REGIONS: CPT | Performed by: PHYSICAL THERAPIST

## 2018-12-14 NOTE — PROGRESS NOTES
Daily Note     Today's date: 2018  Patient name: Heather Nunez  : 1970  MRN: [id number]  Referring provider: Humberto Argueta DO  Dx:   Encounter Diagnosis     ICD-10-CM    1  Chronic neck pain M54 2     G89 29    2  Cervicogenic headache R51    3  Chronic tension-type headache, not intractable G44 229    4  Bilateral temporomandibular joint pain M26 623                   Subjective: Amanda Vicente reports that he is feeling okay today, some right  Scapular pain and headache on right today  Has appointment for consult with Dr Yoan Mccord on        Objective: See treatment diary below  Assessment:   1) sternoclavicular hypertonicity - addressing with neuromotor retraining   2) scapular dyskinesis - addressing with neuromotor retraining   3) poor TMJ movement coordination - addressing with neuromotor retraining   4) upper cervical hypomobility - addressing with mobs and mobility exercises   5) poor cervicothoracic movement coordination - addressing with functional retraining  6) poor postural control - addressing with neuromotor retraining     Goals  Patient will be independent with home exercise program  - in progress  Patient will be able to manage symptoms independently  - in progress  Patient will be able to chew without limitation due to pain  - in progress  Patient will be able to turn to look over a shoulder to back out of a parking space without limitation due to pain  - in progress  Patient will be able to yawn without limitation due to pain  - in progress    Plan: Continue per plan of care   2x/wk      Precautions: depression    Daily Treatment Diary     Date:  11/27 12/4 12/7 12/10 12/14        Manual              Supine UPA to cervical spine SK SK AF AF AF        SOR/MFR SK SK AF AF AF        L lateral glides C6/C7     AF                     Active/  Assistive Interventions              TMJ relaxed position 10 10           TMJ controlled opening 10 10           Cervical rotation 10 10 Supine capital flexion  5x:05           Scapular clocks  10                                                                                                                   Modalities              MH  10' 10' 10 10' 10'

## 2018-12-17 ENCOUNTER — OFFICE VISIT (OUTPATIENT)
Dept: PHYSICAL THERAPY | Facility: MEDICAL CENTER | Age: 48
End: 2018-12-17
Payer: COMMERCIAL

## 2018-12-17 DIAGNOSIS — G89.29 CHRONIC NECK PAIN: Primary | ICD-10-CM

## 2018-12-17 DIAGNOSIS — G44.229 CHRONIC TENSION-TYPE HEADACHE, NOT INTRACTABLE: ICD-10-CM

## 2018-12-17 DIAGNOSIS — G44.86 CERVICOGENIC HEADACHE: ICD-10-CM

## 2018-12-17 DIAGNOSIS — M54.2 CHRONIC NECK PAIN: Primary | ICD-10-CM

## 2018-12-17 DIAGNOSIS — M26.623 BILATERAL TEMPOROMANDIBULAR JOINT PAIN: ICD-10-CM

## 2018-12-17 PROCEDURE — 97140 MANUAL THERAPY 1/> REGIONS: CPT | Performed by: PHYSICAL THERAPIST

## 2018-12-17 NOTE — PROGRESS NOTES
Daily Note     Today's date: 2018  Patient name: Alec Russell  : 1970  MRN: [id number]  Referring provider: Az Alvarez DO  Dx:   Encounter Diagnosis     ICD-10-CM    1  Chronic neck pain M54 2     G89 29    2  Cervicogenic headache R51    3  Chronic tension-type headache, not intractable G44 229    4  Bilateral temporomandibular joint pain M26 623                   Subjective: Hanny Ahumada reports that he is doing well today, had some soreness yesterday but that is much improved today  Assessment:   1) sternoclavicular hypertonicity - addressing with neuromotor retraining   2) scapular dyskinesis - addressing with neuromotor retraining   3) poor TMJ movement coordination - addressing with neuromotor retraining   4) upper cervical hypomobility - addressing with mobs and mobility exercises   5) poor cervicothoracic movement coordination - addressing with functional retraining  6) poor postural control - addressing with neuromotor retraining     Goals  Patient will be independent with home exercise program  - in progress  Patient will be able to manage symptoms independently  - in progress  Patient will be able to chew without limitation due to pain  - in progress  Patient will be able to turn to look over a shoulder to back out of a parking space without limitation due to pain  - in progress  Patient will be able to yawn without limitation due to pain  - in progress    Plan: Continue per plan of care   2x/wk        Objective: See treatment diary below  Precautions: depression    Daily Treatment Diary     Date:  11/27 12/4 12/7 12/10 12/14 12/17       Manual              Supine UPA to cervical spine SK SK AF AF AF AF       SOR/MFR SK SK AF AF AF AF       L lateral glides C6/C7     AF AF                    Active/  Assistive Interventions              TMJ relaxed position 10 10           TMJ controlled opening 10 10           Cervical rotation 10 10           Supine capital flexion  5x:05 Scapular clocks  10                                                                                                                   Modalities              MH  10' 10' 10 10' 10'

## 2018-12-20 ENCOUNTER — OFFICE VISIT (OUTPATIENT)
Dept: PHYSICAL THERAPY | Facility: MEDICAL CENTER | Age: 48
End: 2018-12-20
Payer: COMMERCIAL

## 2018-12-20 DIAGNOSIS — M54.2 CHRONIC NECK PAIN: Primary | ICD-10-CM

## 2018-12-20 DIAGNOSIS — G89.29 CHRONIC NECK PAIN: Primary | ICD-10-CM

## 2018-12-20 DIAGNOSIS — G44.229 CHRONIC TENSION-TYPE HEADACHE, NOT INTRACTABLE: ICD-10-CM

## 2018-12-20 DIAGNOSIS — G44.86 CERVICOGENIC HEADACHE: ICD-10-CM

## 2018-12-20 DIAGNOSIS — M26.623 BILATERAL TEMPOROMANDIBULAR JOINT PAIN: ICD-10-CM

## 2018-12-20 PROCEDURE — 97140 MANUAL THERAPY 1/> REGIONS: CPT | Performed by: PHYSICAL THERAPIST

## 2018-12-20 PROCEDURE — 97112 NEUROMUSCULAR REEDUCATION: CPT | Performed by: PHYSICAL THERAPIST

## 2018-12-20 PROCEDURE — 97164 PT RE-EVAL EST PLAN CARE: CPT | Performed by: PHYSICAL THERAPIST

## 2018-12-20 NOTE — PROGRESS NOTES
Daily Note & Re-evaluation     Today's date: 2018  Patient name: Michael Hood  : 1970  MRN: [id number]  Referring provider: Marvel Redmond DO  Dx:   Encounter Diagnosis     ICD-10-CM    1  Chronic neck pain M54 2     G89 29    2  Cervicogenic headache R51    3  Chronic tension-type headache, not intractable G44 229    4  Bilateral temporomandibular joint pain M26 623                   Subjective: Jessica Davila reports he still has considerable pain with reaching that goes into his neck and head  He is able to move his shoulder better than when he began this episode of care  Objective: See treatment diary below  Static Posture     Head  Forward  Shoulders  Rounded  Postural Observations  Seated posture: poor  Standing posture: fair  Correction of posture: makes symptoms better        Palpation   Left   Hypertonic in the scalenes, sternocleidomastoid, suboccipitals, infraspinatus,  and upper trapezius  Tenderness of the scalenes, sternocleidomastoid, suboccipitals, infraspinatus,  and upper trapezius  Right   Hypertonic in the scalenes, sternocleidomastoid, suboccipitals, infraspinatus, and upper trapezius  Tenderness of the scalenes, sternocleidomastoid, suboccipitals, infraspinatus,  and upper trapezius       Additional Palpation Details  R medial pterygoid, lateral pterygoid, and temporalis hypertonic and tender to palpation  B masseter hypertonic  Palpation to temporalis increases H/A  Palpation to medial pterygoid decreases H/A      Neurological Testing     Sensation   Cervical/Thoracic   Left   Intact: light touch    Right   Intact: light touch    Reflexes   Left   Biceps (C5/C6): normal (2+)  Marie's reflex: negative    Right   Biceps (C5/C6): normal (2+)  Marie's reflex: negative    Active Range of Motion   Cervical/Thoracic Spine   Normal active range of motion  Left Shoulder   Normal active range of motion    Right Shoulder   Normal active range of motion    Left Elbow   Normal active range of motion    Right Elbow   Normal active range of motion    Left Wrist   Normal active range of motion    Right Wrist   Normal active range of motion    Joint Play Joints within functional limits: C6, C7 and T1 Hypomobile: C1, C2, C3, C4 and C5 Pain: C1, C2, C3, C4 and C5     Strength/Myotome Testing   Cervical Spine     Left   Normal strength    Right etr  Normal strength    Additional Strength Details  DNF strength poor    Tests   Cervical     Left   Positive cervical distraction  Negative alar ligament integrity, Spurling's sign and transverse ligament  Right   Positive cervical distraction and Spurling's sign  Negative alar ligament integrity and transverse ligament       Shoulder: negative drop arm, ER lag, IR lag, belly press, grind, and anterior slide tests bilaterally    Ambulation   Weight-Bearing Status   Weight-Bearing Status (Left): full weight bearing   Weight-Bearing Status (Right): full weight-bearing      Observational Gait   Gait: within functional limits   TMJ   Jaw observations: facial symmetry within normal limits  Occlusion class: class I (normal)  Buccal mucosa: signs of cheek biting  Patient does not have a  cleft palate  Dental findings: Scalloped tongue  Cusp wear noted  ROM: pain with movement  ROM comments: TMJ opening 47mm with pain; inconsistent opening pattern, but most consistent was early B translation, limited L translation at end range, and early R posterior translation  TMJ lateral excursion 15mm R, 10mm L  Lateral bite test - pos on L for ipsilateral pain        Assessment:   1) central sensitization - addressing with extensive counseling regarding pain neuroscience, diagnosis, prognosis, care options, and care planning   2) sternoclavicular hypertonicity - improving very slowly - addressing with neuromotor retraining   3) scapular dyskinesis - improving very slowly - addressing with neuromotor retraining   4) poor TMJ movement coordination - improving - aaddressing with neuromotor retraining   5) upper cervical hypomobility - improving very slowly - addressing with mobs and mobility exercises   6) poor cervicothoracic movement coordination - improving very slowly - addressing with functional retraining  7) poor postural control - improving very slowly - addressing with neuromotor retraining     Ultimately, his scapular dyskinesis is causing irritation and hypertonicity of his SCJ and cervical spine, which in turn is causing radiating facet-type pain into his shoulder and pectoral region  Goals  Patient will be independent with home exercise program  - in progress  Patient will be able to manage symptoms independently  - in progress  Patient will be able to chew without limitation due to pain  - in progress  Patient will be able to turn to look over a shoulder to back out of a parking space without limitation due to pain  - in progress  Patient will be able to yawn without limitation due to pain  - in progress    Plan: Continue per plan of care  2x/wk x 8 weeks  If no further progress in 2 weeks, consider referral to ortho surg to r/o shoulder pathology          Objective: See treatment diary below  Precautions: depression, central sensitization, anxiety    Daily Treatment Diary     Date:  11/27 12/4 12/7 12/10 12/14 12/17 12/20      Manual              Supine UPA to cervical spine SK SK AF AF AF AF SK      SOR/MFR SK SK AF AF AF AF SK      L lateral glides C6/C7     AF AF SK                   Active/  Assistive Interventions              TMJ relaxed position 10 10           TMJ controlled opening 10 10           Cervical rotation 10 10           Supine capital flexion  5x:05     Inc  NV      Scapular clocks  10     10      UE wall slides       10                                                                                                 Modalities              MH  10' 10' 10 10' 10'  NV

## 2018-12-21 ENCOUNTER — APPOINTMENT (OUTPATIENT)
Dept: RADIOLOGY | Facility: MEDICAL CENTER | Age: 48
End: 2018-12-21
Payer: COMMERCIAL

## 2018-12-21 ENCOUNTER — OFFICE VISIT (OUTPATIENT)
Dept: PAIN MEDICINE | Facility: MEDICAL CENTER | Age: 48
End: 2018-12-21
Payer: COMMERCIAL

## 2018-12-21 VITALS
SYSTOLIC BLOOD PRESSURE: 142 MMHG | BODY MASS INDEX: 31.83 KG/M2 | HEIGHT: 68 IN | HEART RATE: 72 BPM | WEIGHT: 210 LBS | DIASTOLIC BLOOD PRESSURE: 97 MMHG

## 2018-12-21 DIAGNOSIS — M25.511 ACUTE PAIN OF RIGHT SHOULDER: ICD-10-CM

## 2018-12-21 DIAGNOSIS — M51.36 DDD (DEGENERATIVE DISC DISEASE), LUMBAR: ICD-10-CM

## 2018-12-21 DIAGNOSIS — M79.18 CERVICAL MYOFASCIAL PAIN SYNDROME: Primary | ICD-10-CM

## 2018-12-21 PROCEDURE — 20552 NJX 1/MLT TRIGGER POINT 1/2: CPT | Performed by: NURSE PRACTITIONER

## 2018-12-21 PROCEDURE — 99214 OFFICE O/P EST MOD 30 MIN: CPT | Performed by: NURSE PRACTITIONER

## 2018-12-21 PROCEDURE — 73030 X-RAY EXAM OF SHOULDER: CPT

## 2018-12-21 RX ORDER — CYCLOBENZAPRINE HCL 10 MG
10 TABLET ORAL 3 TIMES DAILY PRN
Qty: 90 TABLET | Refills: 1 | Status: SHIPPED | OUTPATIENT
Start: 2018-12-21 | End: 2020-02-04 | Stop reason: ALTCHOICE

## 2018-12-21 RX ORDER — LIDOCAINE HYDROCHLORIDE 10 MG/ML
5 INJECTION, SOLUTION INFILTRATION; PERINEURAL ONCE
Status: COMPLETED | OUTPATIENT
Start: 2018-12-21 | End: 2018-12-21

## 2018-12-21 RX ADMIN — LIDOCAINE HYDROCHLORIDE 4 ML: 10 INJECTION, SOLUTION INFILTRATION; PERINEURAL at 08:53

## 2018-12-21 RX ADMIN — LIDOCAINE HYDROCHLORIDE 5 ML: 10 INJECTION, SOLUTION INFILTRATION; PERINEURAL at 08:56
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
## 2018-12-27 ENCOUNTER — OFFICE VISIT (OUTPATIENT)
Dept: PHYSICAL THERAPY | Facility: MEDICAL CENTER | Age: 48
End: 2018-12-27
Payer: COMMERCIAL

## 2018-12-27 DIAGNOSIS — G89.29 CHRONIC NECK PAIN: Primary | ICD-10-CM

## 2018-12-27 DIAGNOSIS — M26.623 BILATERAL TEMPOROMANDIBULAR JOINT PAIN: ICD-10-CM

## 2018-12-27 DIAGNOSIS — G44.86 CERVICOGENIC HEADACHE: ICD-10-CM

## 2018-12-27 DIAGNOSIS — M54.2 CHRONIC NECK PAIN: Primary | ICD-10-CM

## 2018-12-27 DIAGNOSIS — G44.229 CHRONIC TENSION-TYPE HEADACHE, NOT INTRACTABLE: ICD-10-CM

## 2018-12-27 PROCEDURE — 97112 NEUROMUSCULAR REEDUCATION: CPT | Performed by: PHYSICAL THERAPIST

## 2018-12-27 PROCEDURE — 97140 MANUAL THERAPY 1/> REGIONS: CPT | Performed by: PHYSICAL THERAPIST

## 2018-12-27 NOTE — PROGRESS NOTES
Daily Note     Today's date: 2018  Patient name: Agata Wilson  : 1970  MRN: [id number]  Referring provider: Abelardo Woods DO  Dx:   Encounter Diagnosis     ICD-10-CM    1  Chronic neck pain M54 2     G89 29    2  Cervicogenic headache R51    3  Chronic tension-type headache, not intractable G44 229    4  Bilateral temporomandibular joint pain M26 623                   Subjective: Natalya Esquivel reports that the wall slides are aggravating his shoulder and radiating down into his upper arm  He also notes he played darts over the weekend which also aggravated symptoms  Assessment:   1) sternoclavicular hypertonicity - addressing with neuromotor retraining   2) scapular dyskinesis - addressing with neuromotor retraining   3) poor TMJ movement coordination - addressing with neuromotor retraining   4) upper cervical hypomobility - addressing with mobs and mobility exercises   5) poor cervicothoracic movement coordination - addressing with functional retraining  6) poor postural control - addressing with neuromotor retraining     Ultimately, his scapular dyskinesis is causing irritation and hypertonicity of his SCJ and cervical spine, which in turn is causing radiating facet-type pain into his shoulder and pectoral region  Goals  Patient will be independent with home exercise program  - in progress  Patient will be able to manage symptoms independently  - in progress  Patient will be able to chew without limitation due to pain  - in progress  Patient will be able to turn to look over a shoulder to back out of a parking space without limitation due to pain  - in progress  Patient will be able to yawn without limitation due to pain  - in progress    Plan: Continue per plan of care  2x/wk x 8 weeks  If no further progress in 2 weeks, consider referral to ortho surg to r/o shoulder pathology          Objective: See treatment diary below  Precautions: depression, central sensitization, anxiety    Daily Treatment Diary     Date:  11/27 12/4 12/7 12/10 12/14 12/17 12/20 12/27     Manual              Supine UPA to cervical spine SK SK AF AF AF AF SK SK     SOR/MFR SK SK AF AF AF AF SK SK     L lateral glides C6/C7     AF AF SK SK                  Active/  Assistive Interventions              TMJ relaxed position 10 10           TMJ controlled opening 10 10           Cervical rotation 10 10           Supine capital flexion  5x:05     Inc  NV      Scapular clocks  10     10      UE wall slides       10 Hold     Supine ceiling punches        20     Prone scap ret        10                                                                      Modalities              MH  10' 10' 10 10' 10'  NV 10'

## 2018-12-31 ENCOUNTER — OFFICE VISIT (OUTPATIENT)
Dept: PHYSICAL THERAPY | Facility: MEDICAL CENTER | Age: 48
End: 2018-12-31
Payer: COMMERCIAL

## 2018-12-31 DIAGNOSIS — G44.86 CERVICOGENIC HEADACHE: ICD-10-CM

## 2018-12-31 DIAGNOSIS — G89.29 CHRONIC NECK PAIN: Primary | ICD-10-CM

## 2018-12-31 DIAGNOSIS — G44.229 CHRONIC TENSION-TYPE HEADACHE, NOT INTRACTABLE: ICD-10-CM

## 2018-12-31 DIAGNOSIS — M54.2 CHRONIC NECK PAIN: Primary | ICD-10-CM

## 2018-12-31 DIAGNOSIS — M26.623 BILATERAL TEMPOROMANDIBULAR JOINT PAIN: ICD-10-CM

## 2018-12-31 PROCEDURE — 97112 NEUROMUSCULAR REEDUCATION: CPT | Performed by: PHYSICAL THERAPIST

## 2018-12-31 PROCEDURE — 97140 MANUAL THERAPY 1/> REGIONS: CPT | Performed by: PHYSICAL THERAPIST

## 2018-12-31 NOTE — PROGRESS NOTES
Daily Note     Today's date: 2018  Patient name: Sunshine Gilmore  : 1970  MRN: [id number]  Referring provider: Ara Downing DO  Dx:   Encounter Diagnosis     ICD-10-CM    1  Chronic neck pain M54 2     G89 29    2  Cervicogenic headache R51    3  Chronic tension-type headache, not intractable G44 229    4  Bilateral temporomandibular joint pain M26 623                   Subjective: Jose Antonio Marie reports the new exercises are going well  He has less spiking of pain and it is more of a consistent dull pain  Objective: See treatment diary below    Assessment:   1) sternoclavicular hypertonicity - addressing with neuromotor retraining   2) scapular dyskinesis - addressing with neuromotor retraining   3) poor TMJ movement coordination - addressing with neuromotor retraining   4) upper cervical hypomobility - addressing with mobs and mobility exercises   5) poor cervicothoracic movement coordination - addressing with functional retraining  6) poor postural control - addressing with neuromotor retraining     Ultimately, his scapular dyskinesis is causing irritation and hypertonicity of his SCJ and cervical spine, which in turn is causing radiating facet-type pain into his shoulder and pectoral region  Goals  Patient will be independent with home exercise program  - in progress  Patient will be able to manage symptoms independently  - in progress  Patient will be able to chew without limitation due to pain  - in progress  Patient will be able to turn to look over a shoulder to back out of a parking space without limitation due to pain  - in progress  Patient will be able to yawn without limitation due to pain  - in progress    Plan: Continue per plan of care  2x/wk x 7 weeks  If no further progress in 1 week, consider referral to ortho surg to r/o shoulder pathology          Objective: See treatment diary below  Precautions: depression, central sensitization, anxiety    Daily Treatment Diary     Date: 11/27 12/4 12/7 12/10 12/14 12/17 12/20 12/27 12/31    Manual              Supine UPA to cervical spine SK SK AF AF AF AF SK SK SK    SOR/MFR SK SK AF AF AF AF SK SK SK    L lateral glides C6/C7     AF AF SK SK SK                 Active/  Assistive Interventions              TMJ relaxed position 10 10           TMJ controlled opening 10 10           Cervical rotation 10 10           Supine capital flexion  5x:05     Inc  NV      Scapular clocks  10     10      UE wall slides       10 Hold     Supine ceiling punches        20 straight arm x 10    Prone scap ret        10     Prone T         Elbow bent x 10                                                        Modalities              MH  10' 10' 10 10' 10'  NV 10'

## 2019-01-03 ENCOUNTER — OFFICE VISIT (OUTPATIENT)
Dept: PHYSICAL THERAPY | Facility: MEDICAL CENTER | Age: 49
End: 2019-01-03
Payer: COMMERCIAL

## 2019-01-03 DIAGNOSIS — M26.623 BILATERAL TEMPOROMANDIBULAR JOINT PAIN: ICD-10-CM

## 2019-01-03 DIAGNOSIS — M54.2 CHRONIC NECK PAIN: Primary | ICD-10-CM

## 2019-01-03 DIAGNOSIS — G44.229 CHRONIC TENSION-TYPE HEADACHE, NOT INTRACTABLE: ICD-10-CM

## 2019-01-03 DIAGNOSIS — G44.86 CERVICOGENIC HEADACHE: ICD-10-CM

## 2019-01-03 DIAGNOSIS — G89.29 CHRONIC NECK PAIN: Primary | ICD-10-CM

## 2019-01-03 PROCEDURE — 97140 MANUAL THERAPY 1/> REGIONS: CPT | Performed by: PHYSICAL THERAPIST

## 2019-01-03 PROCEDURE — 97112 NEUROMUSCULAR REEDUCATION: CPT | Performed by: PHYSICAL THERAPIST

## 2019-01-03 NOTE — PROGRESS NOTES
Daily Note     Today's date: 1/3/2019  Patient name: Stephanie Dias  : 1970  MRN: [id number]  Referring provider: Xander Whittaker DO  Dx:   Encounter Diagnosis     ICD-10-CM    1  Chronic neck pain M54 2     G89 29    2  Cervicogenic headache R51    3  Chronic tension-type headache, not intractable G44 229    4  Bilateral temporomandibular joint pain M26 623                   Subjective: Sameera Lama reports the new exercises are going well  He has had much fewer headaches since yesterday and today  Objective: See treatment diary below    Assessment:   1) sternoclavicular hypertonicity - addressing with neuromotor retraining   2) scapular dyskinesis - addressing with neuromotor retraining   3) poor TMJ movement coordination - addressing with neuromotor retraining   4) upper cervical hypomobility - addressing with mobs and mobility exercises   5) poor cervicothoracic movement coordination - addressing with functional retraining  6) poor postural control - addressing with neuromotor retraining     Ultimately, his scapular dyskinesis is causing irritation and hypertonicity of his SCJ and cervical spine, which in turn is causing radiating facet-type pain into his shoulder and pectoral region  He is realizing very slow but steady progress  Goals  Patient will be independent with home exercise program  - in progress  Patient will be able to manage symptoms independently  - in progress  Patient will be able to chew without limitation due to pain  - in progress  Patient will be able to turn to look over a shoulder to back out of a parking space without limitation due to pain  - in progress  Patient will be able to yawn without limitation due to pain  - in progress    Plan: Continue per plan of care  2x/wk x 6 weeks            Objective: See treatment diary below  Precautions: depression, central sensitization, anxiety    Daily Treatment Diary     Date:  11/27 12/4 12/7 12/10 12/14 12/17 12/20 12/27 12/31 1/3 Manual              Supine UPA to cervical spine SK SK AF AF AF AF SK SK SK SK   SOR/MFR SK SK AF AF AF AF SK SK SK SK   L lateral glides C6/C7     AF AF SK SK SK SK                Active/  Assistive Interventions              TMJ relaxed position 10 10           TMJ controlled opening 10 10           Cervical rotation 10 10           Supine capital flexion  5x:05     Inc  NV      Scapular clocks  10     10      UE wall slides       10 Hold     Supine ceiling punches        20 straight arm x 10 1#   Prone scap ret        10     Prone T         Elbow bent x 10 Elbow bent x 15   Table plus                                                    Modalities              MH  10' 10' 10 10' 10'  NV 10' 10' 10'

## 2019-01-07 ENCOUNTER — OFFICE VISIT (OUTPATIENT)
Dept: PHYSICAL THERAPY | Facility: MEDICAL CENTER | Age: 49
End: 2019-01-07
Payer: COMMERCIAL

## 2019-01-07 DIAGNOSIS — M26.623 BILATERAL TEMPOROMANDIBULAR JOINT PAIN: ICD-10-CM

## 2019-01-07 DIAGNOSIS — G44.229 CHRONIC TENSION-TYPE HEADACHE, NOT INTRACTABLE: ICD-10-CM

## 2019-01-07 DIAGNOSIS — G44.86 CERVICOGENIC HEADACHE: ICD-10-CM

## 2019-01-07 DIAGNOSIS — M54.2 CHRONIC NECK PAIN: Primary | ICD-10-CM

## 2019-01-07 DIAGNOSIS — G89.29 CHRONIC NECK PAIN: Primary | ICD-10-CM

## 2019-01-07 PROCEDURE — 97112 NEUROMUSCULAR REEDUCATION: CPT | Performed by: PHYSICAL THERAPIST

## 2019-01-07 PROCEDURE — 97140 MANUAL THERAPY 1/> REGIONS: CPT | Performed by: PHYSICAL THERAPIST

## 2019-01-07 NOTE — PROGRESS NOTES
Daily Note     Today's date: 2019  Patient name: Grayson Jordan  : 1970  MRN: [id number]  Referring provider: Leonor Gonzalez DO  Dx:   Encounter Diagnosis     ICD-10-CM    1  Chronic neck pain M54 2     G89 29    2  Cervicogenic headache R51    3  Chronic tension-type headache, not intractable G44 229    4  Bilateral temporomandibular joint pain M26 623                   Subjective: Zohaib Jansen reports the headaches are much less  However, his back is bothering him due to lifting a lot over the weekend  Objective: See treatment diary below    Assessment:   1) sternoclavicular hypertonicity - addressing with neuromotor retraining   2) scapular dyskinesis - addressing with neuromotor retraining   3) poor TMJ movement coordination - addressing with neuromotor retraining   4) upper cervical hypomobility - addressing with mobs and mobility exercises   5) poor cervicothoracic movement coordination - addressing with functional retraining  6) poor postural control - addressing with neuromotor retraining     Ultimately, his scapular dyskinesis is causing irritation and hypertonicity of his SCJ and cervical spine, which in turn is causing radiating facet-type pain into his shoulder and pectoral region  He is realizing very slow but steady progress  Goals  Patient will be independent with home exercise program  - in progress  Patient will be able to manage symptoms independently  - in progress  Patient will be able to chew without limitation due to pain  - in progress  Patient will be able to turn to look over a shoulder to back out of a parking space without limitation due to pain  - in progress  Patient will be able to yawn without limitation due to pain  - in progress    Plan: Continue per plan of care  2x/wk x 5 weeks            Objective: See treatment diary below  Precautions: depression, central sensitization, anxiety    Daily Treatment Diary     Date:  1/7         1/3   Manual              Supine UPA to cervical spine SK         SK   SOR/MFR SK         SK   L lateral glides C6/C7 SK         SK                Active/  Assistive Interventions              Supine ceiling punches 2# x 20         1#   Prone T Elbow bent x 30         Elbow bent x 15   Table plus                                                    Modalities                10'         10'

## 2019-01-10 ENCOUNTER — OFFICE VISIT (OUTPATIENT)
Dept: PHYSICAL THERAPY | Facility: MEDICAL CENTER | Age: 49
End: 2019-01-10
Payer: COMMERCIAL

## 2019-01-10 DIAGNOSIS — G44.86 CERVICOGENIC HEADACHE: ICD-10-CM

## 2019-01-10 DIAGNOSIS — G89.29 CHRONIC NECK PAIN: Primary | ICD-10-CM

## 2019-01-10 DIAGNOSIS — G44.229 CHRONIC TENSION-TYPE HEADACHE, NOT INTRACTABLE: ICD-10-CM

## 2019-01-10 DIAGNOSIS — M54.2 CHRONIC NECK PAIN: Primary | ICD-10-CM

## 2019-01-10 DIAGNOSIS — M26.623 BILATERAL TEMPOROMANDIBULAR JOINT PAIN: ICD-10-CM

## 2019-01-10 PROCEDURE — 97140 MANUAL THERAPY 1/> REGIONS: CPT | Performed by: PHYSICAL THERAPIST

## 2019-01-10 PROCEDURE — 97112 NEUROMUSCULAR REEDUCATION: CPT | Performed by: PHYSICAL THERAPIST

## 2019-01-10 NOTE — PROGRESS NOTES
Daily Note     Today's date: 1/10/2019  Patient name: Michael Hood  : 1970  MRN: [id number]  Referring provider: Marvel Redmond DO  Dx:   Encounter Diagnosis     ICD-10-CM    1  Chronic neck pain M54 2     G89 29    2  Cervicogenic headache R51    3  Chronic tension-type headache, not intractable G44 229    4  Bilateral temporomandibular joint pain M26 623                   Subjective: Robinson Davis reports the headaches are much less  However, his back is bothering him due to lifting a lot over the weekend  Objective: See treatment diary below    Assessment:   1) sternoclavicular hypertonicity - addressing with neuromotor retraining   2) scapular dyskinesis - addressing with neuromotor retraining   3) poor TMJ movement coordination - addressing with neuromotor retraining   4) upper cervical hypomobility - addressing with mobs and mobility exercises   5) poor cervicothoracic movement coordination - addressing with functional retraining  6) poor postural control - addressing with neuromotor retraining     Ultimately, his scapular dyskinesis is causing irritation and hypertonicity of his SCJ and cervical spine, which in turn is causing radiating facet-type pain into his shoulder and pectoral region  He is realizing very slow but steady progress  Goals  Patient will be independent with home exercise program  - in progress  Patient will be able to manage symptoms independently  - in progress  Patient will be able to chew without limitation due to pain  - in progress  Patient will be able to turn to look over a shoulder to back out of a parking space without limitation due to pain  - in progress  Patient will be able to yawn without limitation due to pain  - in progress    Plan: Continue per plan of care  2x/wk x 5 weeks            Objective: See treatment diary below  Precautions: depression, central sensitization, anxiety    Daily Treatment Diary     Date:  1/7 1/10      Manual         Supine UPA to cervical spine SK SK      SOR/MFR SK SK      L lateral glides C6/C7 SK SK              Active/  Assistive Interventions         Supine ceiling punches 2# x 20       Prone T Elbow bent x 30       Table plus        Diaphragmatic breathing  10                      Modalities           10' 10'

## 2019-01-17 ENCOUNTER — OFFICE VISIT (OUTPATIENT)
Dept: PHYSICAL THERAPY | Facility: MEDICAL CENTER | Age: 49
End: 2019-01-17
Payer: COMMERCIAL

## 2019-01-17 DIAGNOSIS — M54.2 CHRONIC NECK PAIN: Primary | ICD-10-CM

## 2019-01-17 DIAGNOSIS — G44.229 CHRONIC TENSION-TYPE HEADACHE, NOT INTRACTABLE: ICD-10-CM

## 2019-01-17 DIAGNOSIS — M26.623 BILATERAL TEMPOROMANDIBULAR JOINT PAIN: ICD-10-CM

## 2019-01-17 DIAGNOSIS — G89.29 CHRONIC NECK PAIN: Primary | ICD-10-CM

## 2019-01-17 DIAGNOSIS — G44.86 CERVICOGENIC HEADACHE: ICD-10-CM

## 2019-01-17 PROCEDURE — 97140 MANUAL THERAPY 1/> REGIONS: CPT | Performed by: PHYSICAL THERAPIST

## 2019-01-17 PROCEDURE — 97112 NEUROMUSCULAR REEDUCATION: CPT | Performed by: PHYSICAL THERAPIST

## 2019-01-17 NOTE — PROGRESS NOTES
Daily Note     Today's date: 2019  Patient name: Hank Sepulveda  : 1970  MRN: [id number]  Referring provider: Katya Morris DO  Dx:   Encounter Diagnosis     ICD-10-CM    1  Chronic neck pain M54 2     G89 29    2  Cervicogenic headache R51    3  Chronic tension-type headache, not intractable G44 229    4  Bilateral temporomandibular joint pain M26 623                   Subjective: Ginette Dos Santos reports he has increased pain in his shoulder today, but less than last visit  His father's surgery went well  Objective: See treatment diary below    Assessment:   1) sternoclavicular hypertonicity - addressing with neuromotor retraining   2) scapular dyskinesis - addressing with neuromotor retraining   3) poor TMJ movement coordination - addressing with neuromotor retraining   4) upper cervical hypomobility - addressing with mobs and mobility exercises   5) poor cervicothoracic movement coordination - addressing with functional retraining  6) poor postural control - addressing with neuromotor retraining     Ultimately, his scapular dyskinesis is causing irritation and hypertonicity of his SCJ and cervical spine, which in turn is causing radiating facet-type pain into his shoulder and pectoral region  He is realizing very slow but steady progress  Goals  Patient will be independent with home exercise program  - in progress  Patient will be able to manage symptoms independently  - in progress  Patient will be able to chew without limitation due to pain  - in progress  Patient will be able to turn to look over a shoulder to back out of a parking space without limitation due to pain  - in progress  Patient will be able to yawn without limitation due to pain  - in progress    Plan: Continue per plan of care  2x/wk x 4 weeks            Objective: See treatment diary below  Precautions: depression, central sensitization, anxiety    Daily Treatment Diary     Date:  1/7 1/10 1/17     Manual         Supine UPA to cervical spine SK SK SK     SOR/MFR SK SK SK     L lateral glides C6/C7 SK SK SK             Active/  Assistive Interventions         Supine ceiling punches 2# x 20  2# x 20     Prone T Elbow bent x 30  Elbow straight x 5     Table plus        Diaphragmatic breathing  10      Ball on table A-Z   1x             Modalities           10' 10' 10'

## 2019-01-21 ENCOUNTER — OFFICE VISIT (OUTPATIENT)
Dept: PHYSICAL THERAPY | Facility: MEDICAL CENTER | Age: 49
End: 2019-01-21
Payer: COMMERCIAL

## 2019-01-21 DIAGNOSIS — G89.29 CHRONIC NECK PAIN: Primary | ICD-10-CM

## 2019-01-21 DIAGNOSIS — M54.2 CHRONIC NECK PAIN: Primary | ICD-10-CM

## 2019-01-21 DIAGNOSIS — G44.86 CERVICOGENIC HEADACHE: ICD-10-CM

## 2019-01-21 DIAGNOSIS — G44.229 CHRONIC TENSION-TYPE HEADACHE, NOT INTRACTABLE: ICD-10-CM

## 2019-01-21 DIAGNOSIS — M26.623 BILATERAL TEMPOROMANDIBULAR JOINT PAIN: ICD-10-CM

## 2019-01-21 PROCEDURE — 97140 MANUAL THERAPY 1/> REGIONS: CPT | Performed by: PHYSICAL THERAPIST

## 2019-01-21 PROCEDURE — 97112 NEUROMUSCULAR REEDUCATION: CPT | Performed by: PHYSICAL THERAPIST

## 2019-01-21 NOTE — PROGRESS NOTES
Daily Note     Today's date: 2019  Patient name: Aurora Mark  : 1970  MRN: [id number]  Referring provider: Kervin Alexis DO  Dx:   Encounter Diagnosis     ICD-10-CM    1  Chronic neck pain M54 2     G89 29    2  Cervicogenic headache R51    3  Chronic tension-type headache, not intractable G44 229    4  Bilateral temporomandibular joint pain M26 623                   Subjective: Demarco Hale reports he was able to get to 7 reps of prone T, but then he his H/A returned and has lasted through today  Objective: See treatment diary below    Assessment:   1) sternoclavicular hypertonicity - addressing with neuromotor retraining   2) scapular dyskinesis - addressing with neuromotor retraining   3) poor TMJ movement coordination - addressing with neuromotor retraining   4) upper cervical hypomobility - addressing with mobs and mobility exercises   5) poor cervicothoracic movement coordination - addressing with functional retraining  6) poor postural control - addressing with neuromotor retraining     Ultimately, his scapular dyskinesis is causing irritation and hypertonicity of his SCJ and cervical spine, which in turn is causing radiating facet-type pain into his shoulder and pectoral region  He is realizing very slow but steady progress  Goals  Patient will be independent with home exercise program  - in progress  Patient will be able to manage symptoms independently  - in progress  Patient will be able to chew without limitation due to pain  - in progress  Patient will be able to turn to look over a shoulder to back out of a parking space without limitation due to pain  - in progress  Patient will be able to yawn without limitation due to pain  - in progress    By end of session, pain decreased significantly, but due to the such volatile symptoms, progress will be slower than anticipated  Plan: Continue per plan of care  2x/wk x 6 weeks            Objective: See treatment diary below  Precautions: depression, central sensitization, anxiety    Daily Treatment Diary     Date:  1/7 1/10 1/17 1/21    Manual         Supine UPA to cervical spine SK SK SK SK    SOR/MFR ESTER NORMAN SK    L lateral glides C6/C7 SK SK SK SK            Active/  Assistive Interventions         Supine ceiling punches 2# x 20  2# x 20 2# x 20    Prone T Elbow bent x 30  Elbow straight x 5 Hold    Table plus        Diaphragmatic breathing  10      Ball on table A-Z   1x 1x    scap 4    -    Modalities         MH  10' 10' 10' 10'

## 2019-01-28 ENCOUNTER — OFFICE VISIT (OUTPATIENT)
Dept: PHYSICAL THERAPY | Facility: MEDICAL CENTER | Age: 49
End: 2019-01-28
Payer: COMMERCIAL

## 2019-01-28 DIAGNOSIS — M26.623 BILATERAL TEMPOROMANDIBULAR JOINT PAIN: ICD-10-CM

## 2019-01-28 DIAGNOSIS — G89.29 CHRONIC NECK PAIN: Primary | ICD-10-CM

## 2019-01-28 DIAGNOSIS — G44.86 CERVICOGENIC HEADACHE: ICD-10-CM

## 2019-01-28 DIAGNOSIS — M54.2 CHRONIC NECK PAIN: Primary | ICD-10-CM

## 2019-01-28 DIAGNOSIS — G44.229 CHRONIC TENSION-TYPE HEADACHE, NOT INTRACTABLE: ICD-10-CM

## 2019-01-28 PROCEDURE — 97140 MANUAL THERAPY 1/> REGIONS: CPT | Performed by: PHYSICAL THERAPIST

## 2019-01-28 NOTE — PROGRESS NOTES
Daily Note     Today's date: 2019  Patient name: Abbe Rich  : 1970  MRN: [id number]  Referring provider: Luis Clark DO  Dx:   Encounter Diagnosis     ICD-10-CM    1  Chronic neck pain M54 2     G89 29    2  Cervicogenic headache R51    3  Chronic tension-type headache, not intractable G44 229    4  Bilateral temporomandibular joint pain M26 623                   Subjective: Jonelle Kocher reports his dad was found unresponsive and resuscitated late last week and thus he has been under incredible stressors  He was doing better after last session with 3 5 hours between headaches  However, since his dad's cardiac event, he has had a constant headache and scapular pain  Objective: See treatment diary below    Assessment:   1) sternoclavicular hypertonicity - addressing with neuromotor retraining   2) scapular dyskinesis - addressing with neuromotor retraining   3) poor TMJ movement coordination - addressing with neuromotor retraining   4) upper cervical hypomobility - addressing with mobs and mobility exercises   5) poor cervicothoracic movement coordination - addressing with functional retraining  6) poor postural control - addressing with neuromotor retraining     Ultimately, his scapular dyskinesis is causing irritation and hypertonicity of his SCJ and cervical spine, which in turn is causing radiating facet-type pain into his shoulder and pectoral region  He is realizing very slow but steady progress  Goals  Patient will be independent with home exercise program  - in progress  Patient will be able to manage symptoms independently  - in progress  Patient will be able to chew without limitation due to pain  - in progress  Patient will be able to turn to look over a shoulder to back out of a parking space without limitation due to pain  - in progress  Patient will be able to yawn without limitation due to pain   - in progress    By end of session, pain decreased significantly, but due to the such volatile symptoms, progress will be slower than anticipated  Plan: Continue per plan of care  2x/wk x 6 weeks            Objective: See treatment diary below  Precautions: depression, central sensitization, anxiety    Daily Treatment Diary     Date:  1/7 1/10 1/17 1/21 1/28   Manual         Supine UPA to cervical spine SK ESTER NORMAN SK   SOR/MFR ESTER NORMAN SK   L lateral glides C6/C7 ESTER NORMAN SK           Active/  Assistive Interventions         Supine ceiling punches 2# x 20  2# x 20 2# x 20    Prone T Elbow bent x 30  Elbow straight x 5 Hold    Table plus        Diaphragmatic breathing  10      Ball on table A-Z   1x 1x    scap 4    -    Modalities         MH  10' 10' 10' 10' 10'

## 2019-01-31 ENCOUNTER — OFFICE VISIT (OUTPATIENT)
Dept: PHYSICAL THERAPY | Facility: MEDICAL CENTER | Age: 49
End: 2019-01-31
Payer: COMMERCIAL

## 2019-01-31 DIAGNOSIS — G44.229 CHRONIC TENSION-TYPE HEADACHE, NOT INTRACTABLE: ICD-10-CM

## 2019-01-31 DIAGNOSIS — M54.2 CHRONIC NECK PAIN: Primary | ICD-10-CM

## 2019-01-31 DIAGNOSIS — G89.29 CHRONIC NECK PAIN: Primary | ICD-10-CM

## 2019-01-31 DIAGNOSIS — M26.623 BILATERAL TEMPOROMANDIBULAR JOINT PAIN: ICD-10-CM

## 2019-01-31 DIAGNOSIS — G44.86 CERVICOGENIC HEADACHE: ICD-10-CM

## 2019-01-31 PROCEDURE — 97140 MANUAL THERAPY 1/> REGIONS: CPT | Performed by: PHYSICAL THERAPIST

## 2019-01-31 PROCEDURE — 97112 NEUROMUSCULAR REEDUCATION: CPT | Performed by: PHYSICAL THERAPIST

## 2019-01-31 NOTE — PROGRESS NOTES
Daily Note     Today's date: 2019  Patient name: Roxie Rodriguez  : 1970  MRN: [id number]  Referring provider: Maya Waddell DO  Dx:   Encounter Diagnosis     ICD-10-CM    1  Chronic neck pain M54 2     G89 29    2  Cervicogenic headache R51    3  Chronic tension-type headache, not intractable G44 229    4  Bilateral temporomandibular joint pain M26 623                   Subjective: Yolie Ann reports his pain is much better in his neck and head  Still considerable pain in arm and scapula and pectorals though  Objective: See treatment diary below    Assessment:   1) sternoclavicular hypertonicity - addressing with neuromotor retraining   2) scapular dyskinesis - addressing with neuromotor retraining   3) poor TMJ movement coordination - addressing with neuromotor retraining   4) upper cervical hypomobility - addressing with mobs and mobility exercises   5) poor cervicothoracic movement coordination - addressing with functional retraining  6) poor postural control - addressing with neuromotor retraining     Ultimately, his scapular dyskinesis is causing irritation and hypertonicity of his SCJ and cervical spine, which in turn is causing radiating facet-type pain into his shoulder and pectoral region  He is realizing very slow but steady progress  Goals  Patient will be independent with home exercise program  - in progress  Patient will be able to manage symptoms independently  - in progress  Patient will be able to chew without limitation due to pain  - in progress  Patient will be able to turn to look over a shoulder to back out of a parking space without limitation due to pain  - in progress  Patient will be able to yawn without limitation due to pain  - in progress    By end of session, pain decreased significantly, but due to the such volatile symptoms, progress will be slower than anticipated  Plan: Continue per plan of care  2x/wk x 6 weeks            Objective: See treatment diary below  Precautions: depression, central sensitization, anxiety    Daily Treatment Diary     Date:  1/31       Manual         Supine UPA to cervical spine SK       SOR/MFR SK       L lateral glides C6/C7 SK               Active/  Assistive Interventions         Supine ceiling punches 3# x 20       Diaphragmatic breathing        Ball on table A-Z 1x       Mid row yellow 10       Shoulder ext -       W - -       - - -     Modalities           10'

## 2019-02-04 ENCOUNTER — OFFICE VISIT (OUTPATIENT)
Dept: PHYSICAL THERAPY | Facility: MEDICAL CENTER | Age: 49
End: 2019-02-04
Payer: COMMERCIAL

## 2019-02-04 DIAGNOSIS — G44.86 CERVICOGENIC HEADACHE: ICD-10-CM

## 2019-02-04 DIAGNOSIS — G89.29 CHRONIC NECK PAIN: Primary | ICD-10-CM

## 2019-02-04 DIAGNOSIS — M54.2 CHRONIC NECK PAIN: Primary | ICD-10-CM

## 2019-02-04 DIAGNOSIS — G44.229 CHRONIC TENSION-TYPE HEADACHE, NOT INTRACTABLE: ICD-10-CM

## 2019-02-04 DIAGNOSIS — M26.623 BILATERAL TEMPOROMANDIBULAR JOINT PAIN: ICD-10-CM

## 2019-02-04 PROCEDURE — 97140 MANUAL THERAPY 1/> REGIONS: CPT | Performed by: PHYSICAL THERAPIST

## 2019-02-04 PROCEDURE — 97112 NEUROMUSCULAR REEDUCATION: CPT | Performed by: PHYSICAL THERAPIST

## 2019-02-04 NOTE — PROGRESS NOTES
Daily Note     Today's date: 2019  Patient name: Messi Mcfarlane  : 1970  MRN: [id number]  Referring provider: Palak Kaur DO  Dx:   Encounter Diagnosis     ICD-10-CM    1  Chronic neck pain M54 2     G89 29    2  Cervicogenic headache R51    3  Chronic tension-type headache, not intractable G44 229    4  Bilateral temporomandibular joint pain M26 623                   Subjective: Demian Huynh reports his pain was better until this weekend when he had to take his mother to the emergicenter due to respiratory problems  Additionally, he is dealing with his father being transferred to a SNF  Objective: See treatment diary below    Assessment:   1) sternoclavicular hypertonicity - addressing with neuromotor retraining   2) scapular dyskinesis - addressing with neuromotor retraining   3) poor TMJ movement coordination - addressing with neuromotor retraining   4) upper cervical hypomobility - addressing with mobs and mobility exercises   5) poor cervicothoracic movement coordination - addressing with functional retraining  6) poor postural control - addressing with neuromotor retraining     Ultimately, his scapular dyskinesis is causing irritation and hypertonicity of his SCJ and cervical spine, which in turn is causing radiating facet-type pain into his shoulder and pectoral region  He is realizing very slow but steady progress  Goals  Patient will be independent with home exercise program  - in progress  Patient will be able to manage symptoms independently  - in progress  Patient will be able to chew without limitation due to pain  - in progress  Patient will be able to turn to look over a shoulder to back out of a parking space without limitation due to pain  - in progress  Patient will be able to yawn without limitation due to pain  - in progress    By end of session, pain decreased significantly, but due to the such volatile symptoms, progress will be slower than anticipated      Plan: Continue per plan of care  2x/wk x 6 weeks  However, due to extremely slow progress, he was referred for orthopaedic surgical consultation for 2nd opinion            Objective: See treatment diary below  Precautions: depression, central sensitization, anxiety    Daily Treatment Diary     Date:  1/31 2/4      Manual         Supine UPA to cervical spine SK SK      SOR/MFR SK SK      L lateral glides C6/C7 SK SK              Active/  Assistive Interventions         Supine ceiling punches 3# x 20 review      Diaphragmatic breathing        Ball on table A-Z 1x review      Mid row yellow 10 10      Shoulder ext - 10      W - -       - - -     Modalities           10' 10'

## 2019-02-07 ENCOUNTER — PROCEDURE VISIT (OUTPATIENT)
Dept: PAIN MEDICINE | Facility: MEDICAL CENTER | Age: 49
End: 2019-02-07
Payer: COMMERCIAL

## 2019-02-07 ENCOUNTER — APPOINTMENT (OUTPATIENT)
Dept: PHYSICAL THERAPY | Facility: MEDICAL CENTER | Age: 49
End: 2019-02-07
Payer: COMMERCIAL

## 2019-02-07 DIAGNOSIS — M79.18 MYOFASCIAL PAIN SYNDROME: Primary | ICD-10-CM

## 2019-02-07 PROCEDURE — 20552 NJX 1/MLT TRIGGER POINT 1/2: CPT | Performed by: PHYSICAL MEDICINE & REHABILITATION

## 2019-02-07 PROCEDURE — 76942 ECHO GUIDE FOR BIOPSY: CPT | Performed by: PHYSICAL MEDICINE & REHABILITATION

## 2019-02-07 NOTE — PROGRESS NOTES
Indication:  Muscular pain  Preprocedure diagnosis:  1  Myofascial pain syndrome  Postprocedure diagnosis:  1  Myofascial pain syndrome    Procedure:  Ultrasound-guided right pectoralis major and right subscapular muscle trigger point injection(s)  After discussing the risks, benefits, and alternatives to the procedure, the patient expressed understanding and wished to proceed  The patient was brought to the procedure suite  A procedural pause was conducted to verify:  correct patient identity, procedure to be performed and as applicable, correct side and site, correct patient position, and availability of implants, special equipment or special requirements  A simple surgical tray was used  Prior to the procedure, the right pectoral region and right periscapular region were examined with a 12 MHz linear transducer to visualize the right pectoralis major and right subscapular musculature and determine the optimal needle path  Following this, the area was prepared with a ChloraPrep scrub, then re-examined using the same transducer, a sterile ultrasound transducer cover, and sterile ultrasound transducer gel  Thereafter, using ultrasound guidance, a 2 5-inch 25-gauge needle was advanced into the right pectoralis major  After visualization of the tip and negative aspiration for blood, to have cc of 0 25% bupivacaine was injected into the target muscle  Following the injection, the needle was withdrawn  The procedure was then repeated in the exact same fashion for the right subscapular musculature  The patient tolerated the procedure well and there were no apparent complications  After an appropriate amount of observation, the patient was dismissed from the clinic in good condition under their own power  Initial skin prep over the right chest was performed with chlorhexidine  The patient advised us of prior rash related to use of this    The area was washed clean with tap water and then alcohol  No rash occurred during the time he was with us  He was instructed to contact me immediately if any redness or rash develops  He verbalized understanding and agreement

## 2019-02-11 ENCOUNTER — OFFICE VISIT (OUTPATIENT)
Dept: PHYSICAL THERAPY | Facility: MEDICAL CENTER | Age: 49
End: 2019-02-11
Payer: COMMERCIAL

## 2019-02-11 DIAGNOSIS — M26.623 BILATERAL TEMPOROMANDIBULAR JOINT PAIN: ICD-10-CM

## 2019-02-11 DIAGNOSIS — M54.2 CHRONIC NECK PAIN: Primary | ICD-10-CM

## 2019-02-11 DIAGNOSIS — G44.86 CERVICOGENIC HEADACHE: ICD-10-CM

## 2019-02-11 DIAGNOSIS — G89.29 CHRONIC NECK PAIN: Primary | ICD-10-CM

## 2019-02-11 DIAGNOSIS — G44.229 CHRONIC TENSION-TYPE HEADACHE, NOT INTRACTABLE: ICD-10-CM

## 2019-02-11 PROCEDURE — 97112 NEUROMUSCULAR REEDUCATION: CPT | Performed by: PHYSICAL THERAPIST

## 2019-02-11 PROCEDURE — 97140 MANUAL THERAPY 1/> REGIONS: CPT | Performed by: PHYSICAL THERAPIST

## 2019-02-11 NOTE — PROGRESS NOTES
Daily Note     Today's date: 2019  Patient name: Maria Del Rosario Ward  : 1970  MRN: [id number]  Referring provider: Martha Bowers DO  Dx:   Encounter Diagnosis     ICD-10-CM    1  Chronic neck pain M54 2     G89 29    2  Cervicogenic headache R51    3  Chronic tension-type headache, not intractable G44 229    4  Bilateral temporomandibular joint pain M26 623                   Subjective: Kimberley Burnett reports he had injections to his pectorals and periscapular muscles which helped for about 2-3 days  However, his father was not receiving antibiotics as appropriate, and his GI drain came out and was having difficulties getting appropriate care  Thus, he has been very stressed and is seeking better forms of stress reduction  Objective: See treatment diary below    Assessment:   1) sternoclavicular hypertonicity - addressing with neuromotor retraining   2) scapular dyskinesis - addressing with neuromotor retraining   3) poor TMJ movement coordination - addressing with neuromotor retraining   4) upper cervical hypomobility - addressing with mobs and mobility exercises   5) poor cervicothoracic movement coordination - addressing with functional retraining  6) poor postural control - addressing with neuromotor retraining     Ultimately, his scapular dyskinesis is causing irritation and hypertonicity of his SCJ and cervical spine, which in turn is causing radiating facet-type pain into his shoulder and pectoral region  He is realizing very slow but steady progress  Goals  Patient will be independent with home exercise program  - in progress  Patient will be able to manage symptoms independently  - in progress  Patient will be able to chew without limitation due to pain  - in progress  Patient will be able to turn to look over a shoulder to back out of a parking space without limitation due to pain  - in progress  Patient will be able to yawn without limitation due to pain   - in progress    By end of session, pain decreased significantly, but due to the such volatile symptoms, progress will be slower than anticipated  Plan: Continue per plan of care  2x/wk x 6 weeks  However, due to extremely slow progress, he was referred for orthopaedic surgical consultation for 2nd opinion            Objective: See treatment diary below  Precautions: depression, central sensitization, anxiety    Daily Treatment Diary     Date:  1/31 2/4 2/11     Manual         Supine UPA to cervical spine SK SK SK     SOR/MFR SK SK SK     L lateral glides C6/C7 ESTER NORMAN SK             Active/  Assistive Interventions         Supine ceiling punches 3# x 20 review      Diaphragmatic breathing        Ball on table A-Z 1x review      Mid row yellow 10 10 10     Shoulder ext - 10 10     W - -       - - -     Modalities           10' 10' 10'

## 2019-02-14 ENCOUNTER — OFFICE VISIT (OUTPATIENT)
Dept: PHYSICAL THERAPY | Facility: MEDICAL CENTER | Age: 49
End: 2019-02-14
Payer: COMMERCIAL

## 2019-02-14 DIAGNOSIS — M26.623 BILATERAL TEMPOROMANDIBULAR JOINT PAIN: ICD-10-CM

## 2019-02-14 DIAGNOSIS — G44.86 CERVICOGENIC HEADACHE: ICD-10-CM

## 2019-02-14 DIAGNOSIS — G89.29 CHRONIC NECK PAIN: Primary | ICD-10-CM

## 2019-02-14 DIAGNOSIS — M54.2 CHRONIC NECK PAIN: Primary | ICD-10-CM

## 2019-02-14 DIAGNOSIS — G44.229 CHRONIC TENSION-TYPE HEADACHE, NOT INTRACTABLE: ICD-10-CM

## 2019-02-14 PROCEDURE — 97140 MANUAL THERAPY 1/> REGIONS: CPT | Performed by: PHYSICAL THERAPIST

## 2019-02-14 PROCEDURE — 97112 NEUROMUSCULAR REEDUCATION: CPT | Performed by: PHYSICAL THERAPIST

## 2019-02-14 NOTE — PROGRESS NOTES
Daily Note     Today's date: 2019  Patient name: Jean Mojica  : 1970  MRN: [id number]  Referring provider: Jim Samuels DO  Dx:   Encounter Diagnosis     ICD-10-CM    1  Chronic neck pain M54 2     G89 29    2  Cervicogenic headache R51    3  Bilateral temporomandibular joint pain M26 623    4  Chronic tension-type headache, not intractable G44 229                   Subjective: Sammy Wahl reports he is much more stable in his neck and arm today and over the past few days  Objective: See treatment diary below    Assessment:   1) sternoclavicular hypertonicity - addressing with neuromotor retraining   2) scapular dyskinesis - addressing with neuromotor retraining   3) poor TMJ movement coordination - addressing with neuromotor retraining   4) upper cervical hypomobility - addressing with mobs and mobility exercises   5) poor cervicothoracic movement coordination - addressing with functional retraining  6) poor postural control - addressing with neuromotor retraining     Ultimately, his scapular dyskinesis is causing irritation and hypertonicity of his SCJ and cervical spine, which in turn is causing radiating facet-type pain into his shoulder and pectoral region  He is realizing very slow but steady progress  Goals  Patient will be independent with home exercise program  - in progress  Patient will be able to manage symptoms independently  - in progress  Patient will be able to chew without limitation due to pain  - in progress  Patient will be able to turn to look over a shoulder to back out of a parking space without limitation due to pain  - in progress  Patient will be able to yawn without limitation due to pain  - in progress      Plan: Continue per plan of care  2x/wk x 6 weeks  However, due to extremely slow progress, he was referred for orthopaedic surgical consultation for 2nd opinion            Objective: See treatment diary below  Precautions: depression, central sensitization, anxiety    Daily Treatment Diary     Date:  1/31 2/4 2/11 2/14    Manual         Supine UPA to cervical spine SK SK SK SK    SOR/MFR SK SK SK SK    L lateral glides C6/C7 SK SK SK SK            Active/  Assistive Interventions         Supine ceiling punches 3# x 20 review      Diaphragmatic breathing        Ball on table A-Z 1x review      Mid row yellow 10 10 10 10    Shoulder ext - 10 10 10    W - -  10     - - 10 10    Modalities         MH  10' 10' 10' 10'

## 2019-02-18 ENCOUNTER — OFFICE VISIT (OUTPATIENT)
Dept: PHYSICAL THERAPY | Facility: MEDICAL CENTER | Age: 49
End: 2019-02-18
Payer: COMMERCIAL

## 2019-02-18 DIAGNOSIS — M54.2 CHRONIC NECK PAIN: Primary | ICD-10-CM

## 2019-02-18 DIAGNOSIS — G89.29 CHRONIC NECK PAIN: Primary | ICD-10-CM

## 2019-02-18 DIAGNOSIS — M26.623 BILATERAL TEMPOROMANDIBULAR JOINT PAIN: ICD-10-CM

## 2019-02-18 DIAGNOSIS — G44.229 CHRONIC TENSION-TYPE HEADACHE, NOT INTRACTABLE: ICD-10-CM

## 2019-02-18 DIAGNOSIS — G44.86 CERVICOGENIC HEADACHE: ICD-10-CM

## 2019-02-18 PROCEDURE — 97112 NEUROMUSCULAR REEDUCATION: CPT | Performed by: PHYSICAL THERAPIST

## 2019-02-18 PROCEDURE — 97140 MANUAL THERAPY 1/> REGIONS: CPT | Performed by: PHYSICAL THERAPIST

## 2019-02-18 NOTE — PROGRESS NOTES
Daily Note     Today's date: 2019  Patient name: Sharita Meade  : 1970  MRN: [id number]  Referring provider: Andriy Stanton DO  Dx:   Encounter Diagnosis     ICD-10-CM    1  Chronic neck pain M54 2     G89 29    2  Cervicogenic headache R51    3  Bilateral temporomandibular joint pain M26 623    4  Chronic tension-type headache, not intractable G44 229                   Subjective: Gretchen Maddox reports his pain has been much more stable  Pain is currently in his neck and proximal anterior shoulder  Objective: See treatment diary below    Assessment:   1) sternoclavicular hypertonicity - addressing with neuromotor retraining   2) scapular dyskinesis - addressing with neuromotor retraining   3) poor TMJ movement coordination - addressing with neuromotor retraining   4) upper cervical hypomobility - addressing with mobs and mobility exercises   5) poor cervicothoracic movement coordination - addressing with functional retraining  6) poor postural control - addressing with neuromotor retraining     Ultimately, his scapular dyskinesis is causing irritation and hypertonicity of his SCJ and cervical spine, which in turn is causing radiating facet-type pain into his shoulder and pectoral region  He is realizing very slow but steady progress  Goals  Patient will be independent with home exercise program  - in progress  Patient will be able to manage symptoms independently  - in progress  Patient will be able to chew without limitation due to pain  - in progress  Patient will be able to turn to look over a shoulder to back out of a parking space without limitation due to pain  - in progress  Patient will be able to yawn without limitation due to pain  - in progress      Plan: Continue per plan of care  2x/wk x 6 weeks  He is scheduled on 3/7 for 2nd opinion with Dr Elvia Dubon            Objective: See treatment diary below  Precautions: depression, central sensitization, anxiety    Daily Treatment Diary Date:  1/31 2/4 2/11 2/14 2/18   Manual         Supine UPA to cervical spine SK SK SK SK SK   SOR/MFR SK SK SK SK SK   L lateral glides C6/C7 SK SK SK SK SK           Active/  Assistive Interventions         Supine ceiling punches 3# x 20 review      Diaphragmatic breathing        Ball on table A-Z 1x review      Mid row yellow 10 10 10 10 10   Shoulder ext - 10 10 10 10   W - -  10 10    - - 10 10 10   Doorway plus     10   Modalities         [address]  10' 10' 10' 10' 10'

## 2019-02-21 ENCOUNTER — OFFICE VISIT (OUTPATIENT)
Dept: PHYSICAL THERAPY | Facility: MEDICAL CENTER | Age: 49
End: 2019-02-21
Payer: COMMERCIAL

## 2019-02-21 DIAGNOSIS — M26.623 BILATERAL TEMPOROMANDIBULAR JOINT PAIN: ICD-10-CM

## 2019-02-21 DIAGNOSIS — G89.29 CHRONIC NECK PAIN: Primary | ICD-10-CM

## 2019-02-21 DIAGNOSIS — G44.86 CERVICOGENIC HEADACHE: ICD-10-CM

## 2019-02-21 DIAGNOSIS — M54.2 CHRONIC NECK PAIN: Primary | ICD-10-CM

## 2019-02-21 DIAGNOSIS — G44.229 CHRONIC TENSION-TYPE HEADACHE, NOT INTRACTABLE: ICD-10-CM

## 2019-02-21 PROCEDURE — 97112 NEUROMUSCULAR REEDUCATION: CPT | Performed by: PHYSICAL THERAPIST

## 2019-02-21 PROCEDURE — 97140 MANUAL THERAPY 1/> REGIONS: CPT | Performed by: PHYSICAL THERAPIST

## 2019-02-25 ENCOUNTER — OFFICE VISIT (OUTPATIENT)
Dept: PHYSICAL THERAPY | Facility: MEDICAL CENTER | Age: 49
End: 2019-02-25
Payer: COMMERCIAL

## 2019-02-25 DIAGNOSIS — M54.2 CHRONIC NECK PAIN: Primary | ICD-10-CM

## 2019-02-25 DIAGNOSIS — G44.86 CERVICOGENIC HEADACHE: ICD-10-CM

## 2019-02-25 DIAGNOSIS — G44.229 CHRONIC TENSION-TYPE HEADACHE, NOT INTRACTABLE: ICD-10-CM

## 2019-02-25 DIAGNOSIS — M26.623 BILATERAL TEMPOROMANDIBULAR JOINT PAIN: ICD-10-CM

## 2019-02-25 DIAGNOSIS — G89.29 CHRONIC NECK PAIN: Primary | ICD-10-CM

## 2019-02-25 PROCEDURE — 97140 MANUAL THERAPY 1/> REGIONS: CPT | Performed by: PHYSICAL THERAPIST

## 2019-02-25 NOTE — PROGRESS NOTES
Daily Note & Re-evaluation    Today's date: 2019  Patient name: Connor Lowery  : 1970  MRN: [id number]  Referring provider: Edgar Burns DO  Dx:   Encounter Diagnosis     ICD-10-CM    1  Chronic neck pain M54 2     G89 29    2  Cervicogenic headache R51    3  Bilateral temporomandibular joint pain M26 623    4  Chronic tension-type headache, not intractable G44 229                   Subjective: Priyank Razo reports his pain increased this past Saturday when his car  and he is now having to borrow his father's car while he is in the [facility]  Objective: See treatment diary below    Assessment:   1) sternoclavicular hypertonicity - addressing with neuromotor retraining   2) scapular dyskinesis - addressing with neuromotor retraining   3) poor TMJ movement coordination - addressing with neuromotor retraining   4) upper cervical hypomobility - addressing with mobs and mobility exercises   5) poor cervicothoracic movement coordination - addressing with functional retraining  6) poor postural control - addressing with neuromotor retraining     Ultimately, his scapular dyskinesis is causing irritation and hypertonicity of his SCJ and cervical spine, which in turn is causing radiating facet-type pain into his shoulder and pectoral region  He is realizing very slow but steady progress  By end of session, symptoms restored to baseline, but due to significant increase, no progressions in HEP were made today  Goals  Patient will be independent with home exercise program  - in progress  Patient will be able to manage symptoms independently  - in progress  Patient will be able to chew without limitation due to pain  - in progress  Patient will be able to turn to look over a shoulder to back out of a parking space without limitation due to pain  - in progress  Patient will be able to yawn without limitation due to pain  - in progress      Plan: Continue per plan of care  2x/wk x 4 weeks    He is scheduled on 3/7 for 2nd opinion with Dr Arielle West            Objective: See treatment diary below  Precautions: depression, central sensitization, anxiety    Daily Treatment Diary     Date:  2/21 2/25      Manual         Supine UPA to cervical spine SK SK      SOR/MFR SK SK      L lateral glides C6/C7 SK SK              Active/  Assistive Interventions         Mid row 10 review      Shoulder ext 10 review      W 10 review       10 review      Doorway plus 10 (1/2 step) review      Modalities           10' 10'

## 2019-02-28 ENCOUNTER — OFFICE VISIT (OUTPATIENT)
Dept: PHYSICAL THERAPY | Facility: MEDICAL CENTER | Age: 49
End: 2019-02-28
Payer: COMMERCIAL

## 2019-02-28 DIAGNOSIS — G44.86 CERVICOGENIC HEADACHE: ICD-10-CM

## 2019-02-28 DIAGNOSIS — M54.2 CHRONIC NECK PAIN: Primary | ICD-10-CM

## 2019-02-28 DIAGNOSIS — G44.229 CHRONIC TENSION-TYPE HEADACHE, NOT INTRACTABLE: ICD-10-CM

## 2019-02-28 DIAGNOSIS — G89.29 CHRONIC NECK PAIN: Primary | ICD-10-CM

## 2019-02-28 DIAGNOSIS — M26.623 BILATERAL TEMPOROMANDIBULAR JOINT PAIN: ICD-10-CM

## 2019-02-28 PROCEDURE — 97140 MANUAL THERAPY 1/> REGIONS: CPT | Performed by: PHYSICAL THERAPIST

## 2019-02-28 NOTE — PROGRESS NOTES
Daily Note     Today's date: 2019  Patient name: Flor Rooney  : 1970  MRN: [id number]  Referring provider: Zeina Vogel DO  Dx:   Encounter Diagnosis     ICD-10-CM    1  Chronic neck pain M54 2     G89 29    2  Cervicogenic headache R51    3  Bilateral temporomandibular joint pain M26 623    4  Chronic tension-type headache, not intractable G44 229                   Subjective: Edgard Ayers reports his pain is much decreased since last visit and currently only has pain in his R shoulder and arm  Objective: See treatment diary below    Assessment:   1) sternoclavicular hypertonicity - addressing with neuromotor retraining   2) scapular dyskinesis - addressing with neuromotor retraining   3) poor TMJ movement coordination - addressing with neuromotor retraining   4) upper cervical hypomobility - addressing with mobs and mobility exercises   5) poor cervicothoracic movement coordination - addressing with functional retraining  6) poor postural control - addressing with neuromotor retraining     Ultimately, his scapular dyskinesis is causing irritation and hypertonicity of his SCJ and cervical spine, which in turn is causing radiating facet-type pain into his shoulder and pectoral region  He is realizing very slow but steady progress  Goals  Patient will be independent with home exercise program  - in progress  Patient will be able to manage symptoms independently  - in progress  Patient will be able to chew without limitation due to pain  - in progress  Patient will be able to turn to look over a shoulder to back out of a parking space without limitation due to pain  - in progress  Patient will be able to yawn without limitation due to pain  - in progress      Plan: Continue per plan of care  2x/wk x 4 weeks  He is scheduled on 3/7 for 2nd opinion with Dr Sundeep Salmon            Objective: See treatment diary below  Precautions: depression, central sensitization, anxiety    Daily Treatment Diary Date:  2/21 2/25 2/28     Manual         Supine UPA to cervical spine SK SK SK     SOR/MFR SK SK SK     L lateral glides C6/C7 SK SK SK             Active/  Assistive Interventions         Mid row 10 review      Shoulder ext 10 review      W 10 review       10 review      Doorway plus 10 (1/2 step) review 10 (1 step)       Modalities           10' 10' 10'

## 2019-03-04 ENCOUNTER — OFFICE VISIT (OUTPATIENT)
Dept: PHYSICAL THERAPY | Facility: MEDICAL CENTER | Age: 49
End: 2019-03-04
Payer: COMMERCIAL

## 2019-03-04 DIAGNOSIS — G89.29 CHRONIC NECK PAIN: Primary | ICD-10-CM

## 2019-03-04 DIAGNOSIS — M54.2 CHRONIC NECK PAIN: Primary | ICD-10-CM

## 2019-03-04 DIAGNOSIS — G44.86 CERVICOGENIC HEADACHE: ICD-10-CM

## 2019-03-04 DIAGNOSIS — M26.623 BILATERAL TEMPOROMANDIBULAR JOINT PAIN: ICD-10-CM

## 2019-03-04 PROCEDURE — 97140 MANUAL THERAPY 1/> REGIONS: CPT | Performed by: PHYSICAL THERAPIST

## 2019-03-04 PROCEDURE — 97112 NEUROMUSCULAR REEDUCATION: CPT | Performed by: PHYSICAL THERAPIST

## 2019-03-05 NOTE — PROGRESS NOTES
Daily Note     Today's date: 3/4/2019  Patient name: Rosalva Cheng  : 1970  MRN: [id number]  Referring provider: Melvin Gayle DO  Dx:   Encounter Diagnosis     ICD-10-CM    1  Chronic neck pain M54 2     G89 29    2  Cervicogenic headache R51    3  Bilateral temporomandibular joint pain M26 623                   Subjective: Polly Vincent reports that he is sore from shoveling today       Objective: See treatment diary below  Objective: See treatment diary below    Assessment:   1) sternoclavicular hypertonicity - addressing with neuromotor retraining   2) scapular dyskinesis - addressing with neuromotor retraining   3) poor TMJ movement coordination - addressing with neuromotor retraining   4) upper cervical hypomobility - addressing with mobs and mobility exercises   5) poor cervicothoracic movement coordination - addressing with functional retraining  6) poor postural control - addressing with neuromotor retraining     Ultimately, his scapular dyskinesis is causing irritation and hypertonicity of his SCJ and cervical spine, which in turn is causing radiating facet-type pain into his shoulder and pectoral region  He is realizing very slow but steady progress  Trialed redcord neuromuscular re-education techniques today  Some cramping in UT likely to overcompensation when fatigued  Goals  Patient will be independent with home exercise program  - in progress  Patient will be able to manage symptoms independently  - in progress  Patient will be able to chew without limitation due to pain  - in progress  Patient will be able to turn to look over a shoulder to back out of a parking space without limitation due to pain  - in progress  Patient will be able to yawn without limitation due to pain  - in progress      Plan: Continue per plan of care  2x/wk x 4 weeks  He is scheduled on 3/7 for 2nd opinion with Dr Que Shaver            Objective: See treatment diary below  Precautions: depression, central sensitization, anxiety    Daily Treatment Diary     Date:  2/21 2/25 2/28 3/4    Manual         Supine UPA to cervical spine SK SK SK AF    SOR/MFR SK SK SK AF    L lateral glides C6/C7 SK SK SK AF            Active/  Assistive Interventions         Mid row 10 review      Shoulder ext 10 review      W 10 review       10 review      Redcord supine cervical with scapular activation    20 min     Doorway plus 10 (1/2 step) review 10 (1 step)       Modalities           10' 10' 10'

## 2019-03-07 ENCOUNTER — APPOINTMENT (OUTPATIENT)
Dept: PHYSICAL THERAPY | Facility: MEDICAL CENTER | Age: 49
End: 2019-03-07
Payer: COMMERCIAL

## 2019-03-11 ENCOUNTER — APPOINTMENT (OUTPATIENT)
Dept: PHYSICAL THERAPY | Facility: MEDICAL CENTER | Age: 49
End: 2019-03-11
Payer: COMMERCIAL

## 2019-03-14 ENCOUNTER — APPOINTMENT (OUTPATIENT)
Dept: PHYSICAL THERAPY | Facility: MEDICAL CENTER | Age: 49
End: 2019-03-14
Payer: COMMERCIAL

## 2019-03-18 ENCOUNTER — APPOINTMENT (OUTPATIENT)
Dept: PHYSICAL THERAPY | Facility: MEDICAL CENTER | Age: 49
End: 2019-03-18
Payer: COMMERCIAL

## 2019-03-21 ENCOUNTER — APPOINTMENT (OUTPATIENT)
Dept: PHYSICAL THERAPY | Facility: MEDICAL CENTER | Age: 49
End: 2019-03-21
Payer: COMMERCIAL

## 2019-06-26 ENCOUNTER — OFFICE VISIT (OUTPATIENT)
Dept: FAMILY MEDICINE CLINIC | Facility: CLINIC | Age: 49
End: 2019-06-26
Payer: COMMERCIAL

## 2019-06-26 VITALS
SYSTOLIC BLOOD PRESSURE: 130 MMHG | DIASTOLIC BLOOD PRESSURE: 80 MMHG | TEMPERATURE: 98.6 F | BODY MASS INDEX: 32.82 KG/M2 | OXYGEN SATURATION: 98 % | HEIGHT: 69 IN | HEART RATE: 87 BPM | WEIGHT: 221.6 LBS

## 2019-06-26 DIAGNOSIS — R51.9 CHRONIC DAILY HEADACHE: Primary | ICD-10-CM

## 2019-06-26 DIAGNOSIS — G44.321 INTRACTABLE CHRONIC POST-TRAUMATIC HEADACHE: ICD-10-CM

## 2019-06-26 DIAGNOSIS — M79.18 CERVICAL MYOFASCIAL PAIN SYNDROME: ICD-10-CM

## 2019-06-26 PROCEDURE — 99214 OFFICE O/P EST MOD 30 MIN: CPT | Performed by: FAMILY MEDICINE

## 2019-06-26 PROCEDURE — 3008F BODY MASS INDEX DOCD: CPT | Performed by: FAMILY MEDICINE

## 2019-06-26 PROCEDURE — 1036F TOBACCO NON-USER: CPT | Performed by: FAMILY MEDICINE

## 2019-06-26 RX ORDER — TRAMADOL HYDROCHLORIDE 50 MG/1
TABLET ORAL
COMMUNITY
Start: 2019-06-03 | End: 2021-02-11

## 2019-06-26 RX ORDER — CELECOXIB 200 MG/1
CAPSULE ORAL
COMMUNITY
Start: 2019-06-02 | End: 2019-11-20 | Stop reason: ALTCHOICE

## 2019-06-26 RX ORDER — TOPIRAMATE 25 MG/1
CAPSULE, COATED PELLETS ORAL
Qty: 70 CAPSULE | Refills: 0 | Status: SHIPPED | OUTPATIENT
Start: 2019-06-26 | End: 2019-07-24 | Stop reason: SDUPTHER

## 2019-07-02 DIAGNOSIS — K21.00 ESOPHAGITIS, REFLUX: ICD-10-CM

## 2019-07-02 RX ORDER — OMEPRAZOLE 40 MG/1
40 CAPSULE, DELAYED RELEASE ORAL EVERY 12 HOURS
Qty: 60 CAPSULE | Refills: 5 | Status: SHIPPED | OUTPATIENT
Start: 2019-07-02 | End: 2022-03-31

## 2019-07-24 DIAGNOSIS — R51.9 CHRONIC DAILY HEADACHE: ICD-10-CM

## 2019-07-24 RX ORDER — TOPIRAMATE 25 MG/1
CAPSULE, COATED PELLETS ORAL
Qty: 70 CAPSULE | Refills: 0 | Status: SHIPPED | OUTPATIENT
Start: 2019-07-24 | End: 2019-08-08 | Stop reason: ALTCHOICE

## 2019-07-25 DIAGNOSIS — M53.82 MUSCULOSKELETAL DISORDER INVOLVING STERNOCLEIDOMASTOID: Primary | ICD-10-CM

## 2019-08-08 ENCOUNTER — TELEPHONE (OUTPATIENT)
Dept: FAMILY MEDICINE CLINIC | Facility: CLINIC | Age: 49
End: 2019-08-08

## 2019-08-08 DIAGNOSIS — R51.9 CHRONIC DAILY HEADACHE: ICD-10-CM

## 2019-08-08 RX ORDER — TOPIRAMATE 100 MG/1
100 TABLET, FILM COATED ORAL 2 TIMES DAILY
Qty: 30 TABLET | Refills: 3 | Status: SHIPPED | OUTPATIENT
Start: 2019-08-08 | End: 2019-08-21 | Stop reason: SDUPTHER

## 2019-08-08 NOTE — TELEPHONE ENCOUNTER
Pts wife Tawny called stating pt had gotten refill on topiramate 25mg  Pts wife stated pt was already taking up to 4 capsules daily since finishing last rx  Stated pt will be out in a few days and would need a refill  If pt is continuing to take 4 capsules daily rx would be 17 day rx  Wanted to verify pts pharmacy is [facility]  Please advise

## 2019-08-08 NOTE — TELEPHONE ENCOUNTER
Since he increased his dosage up to 4 pills per day I would just call in a prescription for 100 milligram pills so he can take 1 a day

## 2019-08-21 DIAGNOSIS — R51.9 CHRONIC DAILY HEADACHE: ICD-10-CM

## 2019-08-21 RX ORDER — TOPIRAMATE 25 MG/1
TABLET ORAL
Qty: 42 TABLET | Refills: 0 | Status: SHIPPED | OUTPATIENT
Start: 2019-08-21 | End: 2019-11-20 | Stop reason: ALTCHOICE

## 2019-11-20 ENCOUNTER — OFFICE VISIT (OUTPATIENT)
Dept: FAMILY MEDICINE CLINIC | Facility: CLINIC | Age: 49
End: 2019-11-20
Payer: COMMERCIAL

## 2019-11-20 VITALS
OXYGEN SATURATION: 98 % | SYSTOLIC BLOOD PRESSURE: 140 MMHG | WEIGHT: 222.6 LBS | RESPIRATION RATE: 16 BRPM | TEMPERATURE: 97.2 F | HEIGHT: 69 IN | DIASTOLIC BLOOD PRESSURE: 98 MMHG | BODY MASS INDEX: 32.97 KG/M2 | HEART RATE: 71 BPM

## 2019-11-20 DIAGNOSIS — R03.0 ELEVATED BLOOD-PRESSURE READING WITHOUT DIAGNOSIS OF HYPERTENSION: Primary | ICD-10-CM

## 2019-11-20 PROCEDURE — 1036F TOBACCO NON-USER: CPT | Performed by: FAMILY MEDICINE

## 2019-11-20 PROCEDURE — 99213 OFFICE O/P EST LOW 20 MIN: CPT | Performed by: FAMILY MEDICINE

## 2019-11-20 RX ORDER — METHOCARBAMOL 750 MG/1
750 TABLET, FILM COATED ORAL EVERY 8 HOURS SCHEDULED
COMMUNITY
End: 2020-02-04 | Stop reason: ALTCHOICE

## 2019-11-20 RX ORDER — AMITRIPTYLINE HYDROCHLORIDE 25 MG/1
25 TABLET, FILM COATED ORAL
COMMUNITY
End: 2022-03-31

## 2019-11-20 RX ORDER — NABUMETONE 500 MG/1
750 TABLET, FILM COATED ORAL 2 TIMES DAILY
COMMUNITY
End: 2019-11-20 | Stop reason: ALTCHOICE

## 2019-11-20 NOTE — PROGRESS NOTES
[facility]      NAME: Ibrahima Kaur  AGE: 44 y o  SEX: male  : 1970   MRN: [id number]    DATE: 2019  TIME: 2:25 PM    Assessment and Plan     Problem List Items Addressed This Visit     None      Visit Diagnoses     Elevated blood-pressure reading without diagnosis of hypertension    -  Primary      Will continue to monitor blood pressures  Will hold off on medication at this time  Patient will check blood pressure at physical therapy 2-3 times per week  Discussed possibility of adding calcium channel blocker or beta-blocker if pressures remain high as they may provides some headache prevention as well  Return to office in:  P r n  Chief Complaint     Chief Complaint   Patient presents with    Follow-up     BP Check (Per PT this morning)       History of Present Illness     Patient presents to follow-up on blood pressure  He has had several elevated blood pressure readings when at physical therapy though most of those readings have occurred when he has been in moderate to severe pain  He is dealing with chronic neck pain and headaches for some time now  He goes to pain management and has had 1 series of injections and his cervical spine with short term, 2 months, relief  Blood pressure readings over the last 2 years have generally been good with diastolics well below 90 and systolics below 532 frequently  The following portions of the patient's history were reviewed and updated as appropriate: allergies, current medications, past family history, past medical history, past social history, past surgical history and problem list     Review of Systems   Review of Systems   Constitutional: Negative  Respiratory: Negative  Cardiovascular: Negative  Gastrointestinal: Negative  Genitourinary: Negative  Musculoskeletal: Positive for neck pain  Neurological: Positive for headaches  Psychiatric/Behavioral: Negative          Active Problem List Patient Active Problem List   Diagnosis    TMJ arthralgia    Vitamin D deficiency    Post-traumatic headache    Mild coronary artery disease    Hyperlipidemia    Esophageal reflux    Asthma    Cervical myofascial pain syndrome    Chest tightness or pressure    Elevated lipase    DDD (degenerative disc disease), lumbar    Myofascial pain syndrome       Objective   /98 (BP Location: Left arm, Patient Position: Sitting, Cuff Size: Large)   Pulse 71   Temp (!) 97 2 °F (36 2 °C) (Tympanic)   Resp 16   Ht 5' 9" (1 753 m)   Wt 101 kg (222 lb 9 6 oz)   SpO2 98%   BMI 32 87 kg/m²     Physical Exam   Constitutional: He is oriented to person, place, and time  He appears well-developed and well-nourished  No distress  HENT:   Head: Normocephalic and atraumatic  Eyes: Pupils are equal, round, and reactive to light  Conjunctivae are normal  Right eye exhibits no discharge  Neck: Normal range of motion  No thyromegaly present  Cardiovascular: Normal rate and regular rhythm  Pulmonary/Chest: Effort normal and breath sounds normal  No respiratory distress  Lymphadenopathy:     He has no cervical adenopathy  Neurological: He is alert and oriented to person, place, and time  Skin: Skin is warm and dry  He is not diaphoretic  Psychiatric: He has a normal mood and affect  His behavior is normal  Judgment and thought content normal    Nursing note and vitals reviewed          Current Medications     Current Outpatient Medications:     amitriptyline (ELAVIL) 25 mg tablet, Take 25 mg by mouth daily at bedtime, Disp: , Rfl:     cetirizine (ZyrTEC) 10 mg tablet, Take 10 mg by mouth as needed  , Disp: , Rfl:     methocarbamol (ROBAXIN) 750 mg tablet, Take 750 mg by mouth every 8 (eight) hours, Disp: , Rfl:     Multiple Vitamin (MULTIVITAMIN) capsule, Take 1 capsule by mouth daily, Disp: , Rfl:     omeprazole (PriLOSEC) 40 MG capsule, Take 1 capsule (40 mg total) by mouth every 12 (twelve) hours, Disp: 60 capsule, Rfl: 5    Probiotic Product (PROBIOTIC ADVANCED PO), Take by mouth daily, Disp: , Rfl:     clindamycin (CLEOCIN T) 1 % external solution, Apply topically 2 (two) times a day, Disp: , Rfl:     cyclobenzaprine (FLEXERIL) 10 mg tablet, Take 1 tablet (10 mg total) by mouth 3 (three) times a day as needed for muscle spasms (Patient not taking: Reported on 11/20/2019), Disp: 90 tablet, Rfl: 1    traMADol (ULTRAM) 50 mg tablet, , Disp: , Rfl:     Health Maintenance     Health Maintenance   Topic Date Due    Pneumococcal Vaccine: Pediatrics (0 to 5 Years) and At-Risk Patients (6 to 59 Years) (1 of 1 - PPSV23) 09/12/1974    DTaP,Tdap,and Td Vaccines (1 - Tdap) 09/12/1979    HIV Screening  09/12/1983    BMI: Followup Plan  09/12/1986    PT PLAN OF CARE  12/27/2018    Depression Screening PHQ  11/27/2019    BMI: Adult  06/26/2020    CRC Screening: Colonoscopy  03/26/2024    Pneumococcal Vaccine: 65+ Years (1 of 2 - PCV13) 09/12/2033    Influenza Vaccine  Completed    HIB Vaccine  Aged Out    Hepatitis B Vaccine  Aged Out    IPV Vaccine  Aged Out    Hepatitis A Vaccine  Aged Out    Meningococcal ACWY Vaccine  Aged Out    HPV Vaccine  Aged Out     Immunization History   Administered Date(s) Administered    INFLUENZA 10/20/2018    Influenza Injectable, MDCK, Preservative Free, Quadrivalent, 0 5 mL 10/08/2019    Influenza Quadrivalent, 6-35 Months IM 12/04/2015, 10/17/2016    Influenza TIV (IM) 11/29/2013, 10/02/2014, 10/31/2017       Mitchel Crigler, DO  [facility]

## 2019-11-21 ENCOUNTER — TELEPHONE (OUTPATIENT)
Dept: FAMILY MEDICINE CLINIC | Facility: CLINIC | Age: 49
End: 2019-11-21

## 2019-11-21 DIAGNOSIS — I10 ESSENTIAL HYPERTENSION: Primary | ICD-10-CM

## 2019-11-21 RX ORDER — AMLODIPINE BESYLATE 2.5 MG/1
2.5 TABLET ORAL DAILY
Qty: 30 TABLET | Refills: 1 | Status: SHIPPED | OUTPATIENT
Start: 2019-11-21 | End: 2019-12-09 | Stop reason: SDUPTHER

## 2019-11-22 ENCOUNTER — TELEPHONE (OUTPATIENT)
Dept: FAMILY MEDICINE CLINIC | Facility: CLINIC | Age: 49
End: 2019-11-22

## 2019-12-09 ENCOUNTER — OFFICE VISIT (OUTPATIENT)
Dept: FAMILY MEDICINE CLINIC | Facility: CLINIC | Age: 49
End: 2019-12-09
Payer: COMMERCIAL

## 2019-12-09 VITALS
HEART RATE: 94 BPM | BODY MASS INDEX: 34.39 KG/M2 | SYSTOLIC BLOOD PRESSURE: 134 MMHG | TEMPERATURE: 99 F | DIASTOLIC BLOOD PRESSURE: 86 MMHG | OXYGEN SATURATION: 99 % | WEIGHT: 232.2 LBS | HEIGHT: 69 IN

## 2019-12-09 DIAGNOSIS — I10 ESSENTIAL HYPERTENSION: ICD-10-CM

## 2019-12-09 PROCEDURE — 3079F DIAST BP 80-89 MM HG: CPT | Performed by: FAMILY MEDICINE

## 2019-12-09 PROCEDURE — 3008F BODY MASS INDEX DOCD: CPT | Performed by: FAMILY MEDICINE

## 2019-12-09 PROCEDURE — 3075F SYST BP GE 130 - 139MM HG: CPT | Performed by: FAMILY MEDICINE

## 2019-12-09 PROCEDURE — 1036F TOBACCO NON-USER: CPT | Performed by: FAMILY MEDICINE

## 2019-12-09 PROCEDURE — 99213 OFFICE O/P EST LOW 20 MIN: CPT | Performed by: FAMILY MEDICINE

## 2019-12-09 RX ORDER — LISINOPRIL 10 MG/1
10 TABLET ORAL DAILY
Qty: 30 TABLET | Refills: 3 | Status: SHIPPED | OUTPATIENT
Start: 2019-12-09

## 2019-12-09 RX ORDER — AMLODIPINE BESYLATE 5 MG/1
5 TABLET ORAL DAILY
Qty: 30 TABLET | Refills: 3
Start: 2019-12-09 | End: 2019-12-23 | Stop reason: SDUPTHER

## 2019-12-09 RX ORDER — GABAPENTIN 300 MG/1
300 CAPSULE ORAL 3 TIMES DAILY
COMMUNITY
Start: 2019-11-22 | End: 2020-12-21

## 2019-12-09 NOTE — ASSESSMENT & PLAN NOTE
Long discussion with patient regarding causes of high blood pressure comma essentia versus secondary  Have recommended he continue with amlodipine at 5 mg daily  Will add lisinopril at 10 mg daily  I have asked him to follow this regimen for at least the next 1-2 weeks and then contact me with his readings so we can adjust accordingly  I will also like to see him in the office in approximately 4 weeks

## 2019-12-09 NOTE — PROGRESS NOTES
[facility]      NAME: Oscar Kaur  AGE: 44 y o  SEX: male  : 1970   MRN: [id number]    DATE: 2019  TIME: 6:58 PM    Assessment and Plan     Problem List Items Addressed This Visit     Essential hypertension     Long discussion with patient regarding causes of high blood pressure comma essentia versus secondary  Have recommended he continue with amlodipine at 5 mg daily  Will add lisinopril at 10 mg daily  I have asked him to follow this regimen for at least the next 1-2 weeks and then contact me with his readings so we can adjust accordingly  I will also like to see him in the office in approximately 4 weeks  Relevant Medications    amLODIPine (NORVASC) 5 mg tablet    lisinopril (ZESTRIL) 10 mg tablet          BMI Counseling: Body mass index is 34 kg/m²  The BMI is above normal  Nutrition recommendations include decreasing portion sizes, decreasing fast food intake, limiting drinks that contain sugar, moderation in carbohydrate intake and increasing intake of lean protein  Exercise recommendations include exercising 3-5 times per week  No pharmacotherapy was ordered  Return to office in: 4 weeks    Chief Complaint     Chief Complaint   Patient presents with    Follow-up     high blood pressure       History of Present Illness     Patient returns to the office to follow-up on his blood pressure  After his last visit he was seen in the emergency room and was started on amlodipine 2 5 mg for elevated blood pressure  Since then his blood pressures at home have been elevated and we had actually increased his amlodipine to 5 mg  Despite that increase his home readings continue to be elevated with some diastolics over 867  He does have a slight disparity in blood pressure between his left and right arms  Prior echocardiograms recently as 2 years ago showed mild LVH but no other abnormality including coarctation of the aorta      He continues to deal with his chronic neck pain and headaches  He is awaiting corticosteroid injections of the cervical spine but they have been put on hold due to his poorly controlled blood pressure  The following portions of the patient's history were reviewed and updated as appropriate: allergies, current medications, past family history, past medical history, past social history, past surgical history and problem list     Review of Systems   Review of Systems   Constitutional: Negative  Respiratory: Negative  Cardiovascular: Negative  Gastrointestinal: Negative  Genitourinary: Negative  Musculoskeletal: Positive for neck pain  Neurological: Positive for headaches  Psychiatric/Behavioral: Negative  Active Problem List     Patient Active Problem List   Diagnosis    TMJ arthralgia    Vitamin D deficiency    Post-traumatic headache    Mild coronary artery disease    Hyperlipidemia    Esophageal reflux    Asthma    Cervical myofascial pain syndrome    Chest tightness or pressure    Elevated lipase    DDD (degenerative disc disease), lumbar    Myofascial pain syndrome    Essential hypertension       Objective   /86   Pulse 94   Temp 99 °F (37 2 °C) (Tympanic)   Ht 5' 9 29" (1 76 m)   Wt 105 kg (232 lb 3 2 oz)   SpO2 99%   BMI 34 00 kg/m²     Physical Exam   Constitutional: He is oriented to person, place, and time  He appears well-developed and well-nourished  No distress  HENT:   Head: Normocephalic and atraumatic  Eyes: Pupils are equal, round, and reactive to light  Conjunctivae are normal  Right eye exhibits no discharge  Neck: Normal range of motion  No thyromegaly present  Cardiovascular: Normal rate and regular rhythm  Pulmonary/Chest: Effort normal and breath sounds normal  No respiratory distress  Lymphadenopathy:     He has no cervical adenopathy  Neurological: He is alert and oriented to person, place, and time  Skin: Skin is warm and dry  He is not diaphoretic  Psychiatric: He has a normal mood and affect  His behavior is normal  Judgment and thought content normal    Nursing note and vitals reviewed          Current Medications     Current Outpatient Medications:     amitriptyline (ELAVIL) 25 mg tablet, Take 25 mg by mouth daily at bedtime, Disp: , Rfl:     amLODIPine (NORVASC) 5 mg tablet, Take 1 tablet (5 mg total) by mouth daily, Disp: 30 tablet, Rfl: 3    cetirizine (ZyrTEC) 10 mg tablet, Take 10 mg by mouth as needed  , Disp: , Rfl:     gabapentin (NEURONTIN) 300 mg capsule, Take 300 mg by mouth 3 (three) times a day, Disp: , Rfl:     Multiple Vitamin (MULTIVITAMIN) capsule, Take 1 capsule by mouth daily, Disp: , Rfl:     omeprazole (PriLOSEC) 40 MG capsule, Take 1 capsule (40 mg total) by mouth every 12 (twelve) hours, Disp: 60 capsule, Rfl: 5    Probiotic Product (PROBIOTIC ADVANCED PO), Take by mouth daily, Disp: , Rfl:     clindamycin (CLEOCIN T) 1 % external solution, Apply topically 2 (two) times a day, Disp: , Rfl:     cyclobenzaprine (FLEXERIL) 10 mg tablet, Take 1 tablet (10 mg total) by mouth 3 (three) times a day as needed for muscle spasms (Patient not taking: Reported on 11/20/2019), Disp: 90 tablet, Rfl: 1    lisinopril (ZESTRIL) 10 mg tablet, Take 1 tablet (10 mg total) by mouth daily, Disp: 30 tablet, Rfl: 3    methocarbamol (ROBAXIN) 750 mg tablet, Take 750 mg by mouth every 8 (eight) hours, Disp: , Rfl:     traMADol (ULTRAM) 50 mg tablet, , Disp: , Rfl:     Health Maintenance     Health Maintenance   Topic Date Due    Pneumococcal Vaccine: Pediatrics (0 to 5 Years) and At-Risk Patients (6 to 59 Years) (1 of 1 - PPSV23) 09/12/1974    DTaP,Tdap,and Td Vaccines (1 - Tdap) 09/12/1979    HIV Screening  09/12/1983    BMI: Followup Plan  09/12/1986    PT PLAN OF CARE  12/27/2018    Depression Screening PHQ  11/27/2019    BMI: Adult  11/20/2020    CRC Screening: Colonoscopy  03/26/2024    Pneumococcal Vaccine: 65+ Years (1 of 2 - PCV13) 09/12/2033    Influenza Vaccine  Completed    HIB Vaccine  Aged Out    Hepatitis B Vaccine  Aged Out    IPV Vaccine  Aged Out    Hepatitis A Vaccine  Aged Out    Meningococcal ACWY Vaccine  Aged Out    HPV Vaccine  Aged Out     Immunization History   Administered Date(s) Administered    INFLUENZA 10/20/2018    Influenza Injectable, MDCK, Preservative Free, Quadrivalent, 0 5 mL 10/08/2019    Influenza Quadrivalent, 6-35 Months IM 12/04/2015, 10/17/2016    Influenza TIV (IM) 11/29/2013, 10/02/2014, 10/31/2017       Manuel Brewster DO  [facility]

## 2019-12-23 DIAGNOSIS — I10 ESSENTIAL HYPERTENSION: ICD-10-CM

## 2019-12-24 RX ORDER — AMLODIPINE BESYLATE 5 MG/1
5 TABLET ORAL DAILY
Qty: 30 TABLET | Refills: 3 | Status: SHIPPED | OUTPATIENT
Start: 2019-12-24 | End: 2020-02-04 | Stop reason: ALTCHOICE

## 2020-01-07 DIAGNOSIS — I10 ESSENTIAL HYPERTENSION: Primary | ICD-10-CM

## 2020-01-07 RX ORDER — LOSARTAN POTASSIUM 100 MG/1
100 TABLET ORAL DAILY
Qty: 30 TABLET | Refills: 1 | Status: SHIPPED | OUTPATIENT
Start: 2020-01-07 | End: 2020-02-04 | Stop reason: SINTOL

## 2020-01-30 ENCOUNTER — TELEPHONE (OUTPATIENT)
Dept: FAMILY MEDICINE CLINIC | Facility: CLINIC | Age: 50
End: 2020-01-30

## 2020-01-30 NOTE — TELEPHONE ENCOUNTER
Phone call from wife concerned because patients blood pressure is 170/120 and he was having severe headache and feeling flushed  He has been feeling this way for several hours  Was at a basketball game and asked his wife to bring his blood pressure cuff so he could check it  She was asking if he could double up on one of his blood pressure meds  His blood pressure usually ranges around 150's/high 90's  Discussed with VIRI Bain  Due to the extreme headache and increased blood pressure I advised her that he should be seen at the emergency room for evaluation of symptoms  Patient's wife agreed with decision

## 2020-02-04 ENCOUNTER — OFFICE VISIT (OUTPATIENT)
Dept: FAMILY MEDICINE CLINIC | Facility: CLINIC | Age: 50
End: 2020-02-04
Payer: COMMERCIAL

## 2020-02-04 VITALS
DIASTOLIC BLOOD PRESSURE: 90 MMHG | HEIGHT: 69 IN | HEART RATE: 96 BPM | WEIGHT: 220 LBS | TEMPERATURE: 98 F | OXYGEN SATURATION: 98 % | SYSTOLIC BLOOD PRESSURE: 130 MMHG | BODY MASS INDEX: 32.58 KG/M2 | RESPIRATION RATE: 16 BRPM

## 2020-02-04 DIAGNOSIS — I10 ESSENTIAL HYPERTENSION: Primary | ICD-10-CM

## 2020-02-04 PROCEDURE — 99213 OFFICE O/P EST LOW 20 MIN: CPT | Performed by: FAMILY MEDICINE

## 2020-02-04 PROCEDURE — 3075F SYST BP GE 130 - 139MM HG: CPT | Performed by: FAMILY MEDICINE

## 2020-02-04 PROCEDURE — 1036F TOBACCO NON-USER: CPT | Performed by: FAMILY MEDICINE

## 2020-02-04 PROCEDURE — 3008F BODY MASS INDEX DOCD: CPT | Performed by: FAMILY MEDICINE

## 2020-02-04 PROCEDURE — 3080F DIAST BP >= 90 MM HG: CPT | Performed by: FAMILY MEDICINE

## 2020-02-04 RX ORDER — NIFEDIPINE 60 MG/1
60 TABLET, EXTENDED RELEASE ORAL DAILY
COMMUNITY
Start: 2020-01-31 | End: 2021-01-30

## 2020-02-04 NOTE — PROGRESS NOTES
[facility]      NAME: Allison Kaur  AGE: 44 y o  SEX: male  : 1970   MRN: [id number]    DATE: 2020  TIME: 4:55 PM    Assessment and Plan     Problem List Items Addressed This Visit     Essential hypertension - Primary    Relevant Medications    NIFEdipine (PROCARDIA XL) 60 mg 24 hr tablet        Continue nifedipine and lisinopril for treatment of blood pressure  Patient will continue with his workup through Cardiology for secondary causes  Echocardiogram fairly unremarkable  Home readings improved and reading in the office today was good  Follow-up after he sees Cardiology in March        Return to office in:   P r n  Chief Complaint     Chief Complaint   Patient presents with    Follow-up     meds check up       History of Present Illness     Patient was seen for follow-up of his blood pressure  He has had continue his difficulty with managing his pressures  He had been taking amlodipine and lisinopril and despite that had spikes of his blood pressure with systolics greater than 860 and diastolics greater than 867  He was seen at Cardiology very recently who readjusted his medication  He is now taking lisinopril 10 and nifedipine 60 mg  He is being worked up for secondary causes of hypertension and had a recent echocardiogram   He is feeling somewhat better today because of her recent cervical spine injection which has decreased his pain to a more manageable level  The following portions of the patient's history were reviewed and updated as appropriate: allergies, current medications, past family history, past medical history, past social history, past surgical history and problem list     Review of Systems   Review of Systems   Constitutional: Negative  Respiratory: Negative  Cardiovascular: Negative  Gastrointestinal: Negative  Genitourinary: Negative  Musculoskeletal: Positive for neck pain  Neurological: Positive for headaches  Psychiatric/Behavioral: Negative  Active Problem List     Patient Active Problem List   Diagnosis    TMJ arthralgia    Vitamin D deficiency    Post-traumatic headache    Mild coronary artery disease    Hyperlipidemia    Esophageal reflux    Asthma    Cervical myofascial pain syndrome    Chest tightness or pressure    Elevated lipase    DDD (degenerative disc disease), lumbar    Myofascial pain syndrome    Essential hypertension       Objective   /90 (BP Location: Left arm, Patient Position: Sitting, Cuff Size: Adult)   Pulse 96   Temp 98 °F (36 7 °C) (Tympanic)   Resp 16   Ht 5' 8 9" (1 75 m)   Wt 99 8 kg (220 lb)   SpO2 98%   BMI 32 58 kg/m²     Physical Exam   Constitutional: He is oriented to person, place, and time  He appears well-developed and well-nourished  No distress  HENT:   Head: Normocephalic and atraumatic  Eyes: Pupils are equal, round, and reactive to light  Conjunctivae are normal  Right eye exhibits no discharge  Neck: Normal range of motion  No thyromegaly present  Cardiovascular: Normal rate and regular rhythm  Pulmonary/Chest: Effort normal and breath sounds normal  No respiratory distress  Lymphadenopathy:     He has no cervical adenopathy  Neurological: He is alert and oriented to person, place, and time  Skin: Skin is warm and dry  He is not diaphoretic  Psychiatric: He has a normal mood and affect  His behavior is normal  Judgment and thought content normal    Nursing note and vitals reviewed          Current Medications     Current Outpatient Medications:     amitriptyline (ELAVIL) 25 mg tablet, Take 25 mg by mouth daily at bedtime, Disp: , Rfl:     cetirizine (ZyrTEC) 10 mg tablet, Take 10 mg by mouth as needed  , Disp: , Rfl:     gabapentin (NEURONTIN) 300 mg capsule, Take 300 mg by mouth 3 (three) times a day, Disp: , Rfl:     lisinopril (ZESTRIL) 10 mg tablet, Take 1 tablet (10 mg total) by mouth daily, Disp: 30 tablet, Rfl: 3   Multiple Vitamin (MULTIVITAMIN) capsule, Take 1 capsule by mouth daily, Disp: , Rfl:     NIFEdipine (PROCARDIA XL) 60 mg 24 hr tablet, Take 60 mg by mouth daily, Disp: , Rfl:     omeprazole (PriLOSEC) 40 MG capsule, Take 1 capsule (40 mg total) by mouth every 12 (twelve) hours, Disp: 60 capsule, Rfl: 5    Probiotic Product (PROBIOTIC ADVANCED PO), Take by mouth daily, Disp: , Rfl:     clindamycin (CLEOCIN T) 1 % external solution, Apply topically 2 (two) times a day, Disp: , Rfl:     traMADol (ULTRAM) 50 mg tablet, , Disp: , Rfl:     Health Maintenance     Health Maintenance   Topic Date Due    Pneumococcal Vaccine: Pediatrics (0 to 5 Years) and At-Risk Patients (6 to 59 Years) (1 of 1 - PPSV23) 09/12/1974    DTaP,Tdap,and Td Vaccines (1 - Tdap) 09/12/1979    HIV Screening  09/12/1983    Annual Physical  09/12/1986    PT PLAN OF CARE  12/27/2018    BMI: Followup Plan  12/09/2020    Depression Screening PHQ  02/04/2021    BMI: Adult  02/04/2021    CRC Screening: Colonoscopy  03/26/2024    Pneumococcal Vaccine: 65+ Years (1 of 2 - PCV13) 09/12/2033    Influenza Vaccine  Completed    HIB Vaccine  Aged Out    Hepatitis B Vaccine  Aged Out    IPV Vaccine  Aged Out    Hepatitis A Vaccine  Aged Out    Meningococcal ACWY Vaccine  Aged Out    HPV Vaccine  Aged Out     Immunization History   Administered Date(s) Administered    INFLUENZA 10/20/2018    Influenza Injectable, MDCK, Preservative Free, Quadrivalent, 0 5 mL 10/08/2019    Influenza Quadrivalent, 6-35 Months IM 12/04/2015, 10/17/2016    Influenza TIV (IM) 11/29/2013, 10/02/2014, 10/31/2017       Jorge L Soria DO  [facility]

## 2020-02-24 ENCOUNTER — OFFICE VISIT (OUTPATIENT)
Dept: FAMILY MEDICINE CLINIC | Facility: CLINIC | Age: 50
End: 2020-02-24
Payer: COMMERCIAL

## 2020-02-24 VITALS
DIASTOLIC BLOOD PRESSURE: 86 MMHG | TEMPERATURE: 98.6 F | BODY MASS INDEX: 32.08 KG/M2 | SYSTOLIC BLOOD PRESSURE: 108 MMHG | HEIGHT: 69 IN | OXYGEN SATURATION: 98 % | WEIGHT: 216.6 LBS | HEART RATE: 99 BPM

## 2020-02-24 DIAGNOSIS — J40 BRONCHITIS: Primary | ICD-10-CM

## 2020-02-24 DIAGNOSIS — R05.9 COUGH: ICD-10-CM

## 2020-02-24 PROCEDURE — 3008F BODY MASS INDEX DOCD: CPT | Performed by: FAMILY MEDICINE

## 2020-02-24 PROCEDURE — 3074F SYST BP LT 130 MM HG: CPT | Performed by: NURSE PRACTITIONER

## 2020-02-24 PROCEDURE — 3079F DIAST BP 80-89 MM HG: CPT | Performed by: NURSE PRACTITIONER

## 2020-02-24 PROCEDURE — 3008F BODY MASS INDEX DOCD: CPT | Performed by: NURSE PRACTITIONER

## 2020-02-24 PROCEDURE — 1036F TOBACCO NON-USER: CPT | Performed by: NURSE PRACTITIONER

## 2020-02-24 PROCEDURE — 99213 OFFICE O/P EST LOW 20 MIN: CPT | Performed by: NURSE PRACTITIONER

## 2020-02-24 RX ORDER — BENZONATATE 200 MG/1
200 CAPSULE ORAL 3 TIMES DAILY PRN
Qty: 20 CAPSULE | Refills: 0 | Status: SHIPPED | OUTPATIENT
Start: 2020-02-24 | End: 2021-02-11

## 2020-02-24 RX ORDER — LEVOFLOXACIN 500 MG/1
500 TABLET, FILM COATED ORAL EVERY 24 HOURS
Qty: 7 TABLET | Refills: 0 | Status: SHIPPED | OUTPATIENT
Start: 2020-02-24 | End: 2020-03-02

## 2020-02-24 NOTE — PROGRESS NOTES
[facility]    ASSESSMENT AND PLAN     1  Bronchitis  Presents today with S/S consistent of bronchitis  Physical assessment today as below  Recent exposure to pneumonia  Rx today for Levaquin x7 days  Dose/use reviewed  Tessalon Perles for cough suppressant  May continue OTC cold medicine if preferred  Rest, hydrate, warm salt water gargles, probiotic  Re-evaluate if symptoms persist and/or worsen  Consider chest x-ray if indicated    - levofloxacin (LEVAQUIN) 500 mg tablet; Take 1 tablet (500 mg total) by mouth every 24 hours for 7 days  Dispense: 7 tablet; Refill: 0    2  Cough    - benzonatate (TESSALON) 200 MG capsule; Take 1 capsule (200 mg total) by mouth 3 (three) times a day as needed for cough  Dispense: 20 capsule; Refill: 0            SUBJECTIVE       Patient ID: Mohini Evans is a 44 y o  male  Chief Complaint   Patient presents with    Shortness of Breath     Symptoms started Friday    Cough    Fatigue    Generalized Body Aches    Chest Pain       HISTORY OF PRESENT ILLNESS    Patient presents today with upper respiratory symptoms  Started a few days ago-Friday  Complains of cough, shortness of breath, chest tightness, body aches and chills/rigors  Intermittent nausea, but no vomiting  No change in bowels  Decreased appetite, but has been maintaining good fluid intake        The following portions of the patient's history were reviewed and updated as appropriate: allergies, current medications, past family history, past medical history, past social history, past surgical history and problem list     REVIEW OF SYSTEMS  Review of Systems   Constitutional: Positive for appetite change, chills and fatigue  Negative for fever  HENT: Positive for congestion, postnasal drip and sore throat  Respiratory: Positive for cough, chest tightness and shortness of breath  Negative for wheezing  Cardiovascular: Negative  Gastrointestinal: Positive for nausea   Negative for abdominal pain, constipation, diarrhea and vomiting  Neurological: Positive for headaches  Negative for dizziness  Psychiatric/Behavioral: Negative  OBJECTIVE      VITAL SIGNS  /86 (BP Location: Right arm, Patient Position: Sitting, Cuff Size: Adult)   Pulse 99   Temp 98 6 °F (37 °C)   Ht 5' 9" (1 753 m)   Wt 98 2 kg (216 lb 9 6 oz)   SpO2 98%   BMI 31 99 kg/m²     CURRENT MEDICATIONS    Current Outpatient Medications:     amitriptyline (ELAVIL) 25 mg tablet, Take 25 mg by mouth daily at bedtime, Disp: , Rfl:     cetirizine (ZyrTEC) 10 mg tablet, Take 10 mg by mouth as needed  , Disp: , Rfl:     gabapentin (NEURONTIN) 300 mg capsule, Take 300 mg by mouth 3 (three) times a day, Disp: , Rfl:     lisinopril (ZESTRIL) 10 mg tablet, Take 1 tablet (10 mg total) by mouth daily, Disp: 30 tablet, Rfl: 3    Multiple Vitamin (MULTIVITAMIN) capsule, Take 1 capsule by mouth daily, Disp: , Rfl:     NIFEdipine (PROCARDIA XL) 60 mg 24 hr tablet, Take 60 mg by mouth daily, Disp: , Rfl:     omeprazole (PriLOSEC) 40 MG capsule, Take 1 capsule (40 mg total) by mouth every 12 (twelve) hours, Disp: 60 capsule, Rfl: 5    Probiotic Product (PROBIOTIC ADVANCED PO), Take by mouth daily, Disp: , Rfl:     benzonatate (TESSALON) 200 MG capsule, Take 1 capsule (200 mg total) by mouth 3 (three) times a day as needed for cough, Disp: 20 capsule, Rfl: 0    clindamycin (CLEOCIN T) 1 % external solution, Apply topically 2 (two) times a day, Disp: , Rfl:     levofloxacin (LEVAQUIN) 500 mg tablet, Take 1 tablet (500 mg total) by mouth every 24 hours for 7 days, Disp: 7 tablet, Rfl: 0    traMADol (ULTRAM) 50 mg tablet, , Disp: , Rfl:       PHYSICAL EXAMINATION   Physical Exam   Constitutional: He is oriented to person, place, and time  Vital signs are normal  He appears well-developed and well-nourished     HENT:   Right Ear: Tympanic membrane and ear canal normal    Left Ear: Tympanic membrane and ear canal normal  Nose: Mucosal edema present  Right sinus exhibits no maxillary sinus tenderness and no frontal sinus tenderness  Left sinus exhibits no maxillary sinus tenderness and no frontal sinus tenderness  Mouth/Throat: Mucous membranes are normal    Cardiovascular: Normal rate and regular rhythm  Pulmonary/Chest: Effort normal and breath sounds normal  No respiratory distress  Lung fields clear, but strong cough during exam   Lymphadenopathy:        Head (right side): No submandibular and no tonsillar adenopathy present  Head (left side): No submandibular and no tonsillar adenopathy present  He has no cervical adenopathy  Neurological: He is alert and oriented to person, place, and time  Psychiatric: He has a normal mood and affect  Thought content normal    Nursing note and vitals reviewed

## 2020-03-13 ENCOUNTER — TELEPHONE (OUTPATIENT)
Dept: PAIN MEDICINE | Facility: CLINIC | Age: 50
End: 2020-03-13

## 2020-05-02 ENCOUNTER — TELEMEDICINE (OUTPATIENT)
Dept: FAMILY MEDICINE CLINIC | Facility: CLINIC | Age: 50
End: 2020-05-02
Payer: COMMERCIAL

## 2020-05-02 DIAGNOSIS — R06.02 SHORTNESS OF BREATH: Primary | ICD-10-CM

## 2020-05-02 PROCEDURE — 99214 OFFICE O/P EST MOD 30 MIN: CPT | Performed by: FAMILY MEDICINE

## 2020-05-03 ENCOUNTER — APPOINTMENT (OUTPATIENT)
Dept: RADIOLOGY | Facility: MEDICAL CENTER | Age: 50
End: 2020-05-03
Payer: COMMERCIAL

## 2020-05-03 DIAGNOSIS — R06.02 SHORTNESS OF BREATH: ICD-10-CM

## 2020-05-03 PROCEDURE — 71046 X-RAY EXAM CHEST 2 VIEWS: CPT

## 2020-06-02 DIAGNOSIS — R53.82 CHRONIC FATIGUE: Primary | ICD-10-CM

## 2020-06-03 ENCOUNTER — TELEPHONE (OUTPATIENT)
Dept: FAMILY MEDICINE CLINIC | Facility: CLINIC | Age: 50
End: 2020-06-03

## 2020-09-19 DIAGNOSIS — L08.9 SKIN INFECTION: Primary | ICD-10-CM

## 2020-09-19 RX ORDER — CLINDAMYCIN PHOSPHATE 11.9 MG/ML
SOLUTION TOPICAL 2 TIMES DAILY
Qty: 30 ML | Refills: 0 | Status: SHIPPED | OUTPATIENT
Start: 2020-09-19 | End: 2021-02-11

## 2020-09-19 RX ORDER — CLINDAMYCIN PHOSPHATE 11.9 MG/ML
SOLUTION TOPICAL 2 TIMES DAILY
Qty: 30 ML | Refills: 0 | Status: SHIPPED | OUTPATIENT
Start: 2020-09-19 | End: 2021-02-11 | Stop reason: SDUPTHER

## 2020-12-21 ENCOUNTER — TELEMEDICINE (OUTPATIENT)
Dept: FAMILY MEDICINE CLINIC | Facility: CLINIC | Age: 50
End: 2020-12-21
Payer: COMMERCIAL

## 2020-12-21 DIAGNOSIS — R51.9 CHRONIC DAILY HEADACHE: ICD-10-CM

## 2020-12-21 DIAGNOSIS — L08.9 SKIN INFECTION: ICD-10-CM

## 2020-12-21 DIAGNOSIS — M54.12 CERVICAL RADICULOPATHY: Primary | ICD-10-CM

## 2020-12-21 PROBLEM — M50.30 DDD (DEGENERATIVE DISC DISEASE), CERVICAL: Status: ACTIVE | Noted: 2018-12-04

## 2020-12-21 PROCEDURE — 99214 OFFICE O/P EST MOD 30 MIN: CPT | Performed by: FAMILY MEDICINE

## 2020-12-21 RX ORDER — PREGABALIN 50 MG/1
50 CAPSULE ORAL 2 TIMES DAILY
Qty: 60 CAPSULE | Refills: 0 | Status: SHIPPED | OUTPATIENT
Start: 2020-12-21 | End: 2021-04-02 | Stop reason: SDUPTHER

## 2020-12-23 ENCOUNTER — TELEPHONE (OUTPATIENT)
Dept: FAMILY MEDICINE CLINIC | Facility: CLINIC | Age: 50
End: 2020-12-23

## 2021-01-28 ENCOUNTER — APPOINTMENT (OUTPATIENT)
Dept: PHYSICAL THERAPY | Facility: MEDICAL CENTER | Age: 51
End: 2021-01-28
Payer: COMMERCIAL

## 2021-01-29 ENCOUNTER — EVALUATION (OUTPATIENT)
Dept: PHYSICAL THERAPY | Facility: MEDICAL CENTER | Age: 51
End: 2021-01-29
Payer: COMMERCIAL

## 2021-01-29 DIAGNOSIS — G54.0 THORACIC OUTLET SYNDROME OF RIGHT THORACIC OUTLET: Primary | ICD-10-CM

## 2021-01-29 PROCEDURE — 97140 MANUAL THERAPY 1/> REGIONS: CPT

## 2021-01-29 PROCEDURE — 97163 PT EVAL HIGH COMPLEX 45 MIN: CPT
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
## 2021-02-01 ENCOUNTER — APPOINTMENT (OUTPATIENT)
Dept: PHYSICAL THERAPY | Facility: MEDICAL CENTER | Age: 51
End: 2021-02-01
Payer: COMMERCIAL

## 2021-02-03 ENCOUNTER — OFFICE VISIT (OUTPATIENT)
Dept: PHYSICAL THERAPY | Facility: MEDICAL CENTER | Age: 51
End: 2021-02-03
Payer: COMMERCIAL

## 2021-02-03 ENCOUNTER — TRANSCRIBE ORDERS (OUTPATIENT)
Dept: PHYSICAL THERAPY | Facility: MEDICAL CENTER | Age: 51
End: 2021-02-03

## 2021-02-03 DIAGNOSIS — G54.0 THORACIC OUTLET SYNDROME: Primary | ICD-10-CM

## 2021-02-03 DIAGNOSIS — G54.0 THORACIC OUTLET SYNDROME OF RIGHT THORACIC OUTLET: Primary | ICD-10-CM

## 2021-02-03 PROCEDURE — 97140 MANUAL THERAPY 1/> REGIONS: CPT

## 2021-02-03 PROCEDURE — 97010 HOT OR COLD PACKS THERAPY: CPT

## 2021-02-03 PROCEDURE — 97112 NEUROMUSCULAR REEDUCATION: CPT

## 2021-02-03 PROCEDURE — 97110 THERAPEUTIC EXERCISES: CPT

## 2021-02-03 NOTE — PROGRESS NOTES
Daily Note     Today's date: 2/3/2021  Patient name: Calista Singh  : 1970  MRN: [id number]  Referring provider: Cecil Number, DO  Dx:   Encounter Diagnosis     ICD-10-CM    1  Thoracic outlet syndrome of right thoracic outlet  G54 0                   Subjective: Pt reports he only is having pain down his biceps (Pt reports surgeon had to reroute tendon to make room for pec release)  He had to snow blow yesterday, no choice  Low back bothering him more than anything      Objective: See treatment diary below      Assessment: Tolerated treatment well  Patient exhibited good technique with therapeutic exercises and would benefit from continued PT   Initiated program, pt had significant improvement in his neural mobility compared to IE; still had some tension with wrist extension, ER able to move more freely  Active neural tension to 110 degrees abd- pt inclined to IR shoulder  Pt had some trouble activating scapular musculature without activating UT, required tactile cueing  No complaints post session  Plan: Continue per plan of care        Precautions: DOS 21  HEP: cervical rotation, ext, flexion stretch; wand overhead AA flex, doorway stretch, diaphragmatic breathing, upper trunk rotation, PNG's median bias    Manuals 2/3            Gentle cervical PROM 10            SCM STM 5            Shoulder PROM/AA PNF patterns 5             20            Neuro Re-Ed             Cervical retraction             PNG's- peripheral and C/S 15x             standing sh scap retraction/ext 20x                         diaphragmatic breathing supine 15x            ADIM marches 20x each             15            Ther Ex             Table slides sh flex, scaption             Ball roll on plinth 15 flex and abd                                                                              10            Ther Activity                                       Gait Training                                       Modalities MH 2 cervicals on R            CP

## 2021-02-09 ENCOUNTER — OFFICE VISIT (OUTPATIENT)
Dept: PHYSICAL THERAPY | Facility: MEDICAL CENTER | Age: 51
End: 2021-02-09
Payer: COMMERCIAL

## 2021-02-09 DIAGNOSIS — G54.0 THORACIC OUTLET SYNDROME OF RIGHT THORACIC OUTLET: Primary | ICD-10-CM

## 2021-02-09 PROCEDURE — 97112 NEUROMUSCULAR REEDUCATION: CPT

## 2021-02-09 PROCEDURE — 97110 THERAPEUTIC EXERCISES: CPT

## 2021-02-09 PROCEDURE — 97140 MANUAL THERAPY 1/> REGIONS: CPT

## 2021-02-09 NOTE — PROGRESS NOTES
Daily Note     Today's date: 2021  Patient name: Rylan Woodall  : 1970  MRN: [id number]  Referring provider: Sirisha De La Fuente DO  Dx:   Encounter Diagnosis     ICD-10-CM    1  Thoracic outlet syndrome of right thoracic outlet  G54 0                   Subjective: Pt reports hes been dong well with exericses yet he thinks he over did it the other night because his headaches got a little worse after  Objective: See treatment diary below      Assessment: Tolerated treatment well  Patient exhibited good technique with therapeutic exercises and would benefit from continued PT  Added gentle isometrics for R shoulder today- pt experienced increased pain behind R eye after- admitted to using his cervical muscles during contraction  Advised pt to work on decreasing output in his contraction until he feels he can master isolating his shoulder musculature from his cervical musculature  Pt reported no tension to his anterior shoulder with exercises  Plan: Continue per plan of care        Precautions: DOS 21  HEP: cervical rotation, ext, flexion stretch; wand overhead AA flex, doorway stretch, diaphragmatic breathing, upper trunk rotation, PNG's median bias    Manuals 2/3 2/9           Gentle cervical PROM 10 10           SCM STM 5            Shoulder PROM/AA PNF patterns 5 10            20 20           Neuro Re-Ed             Cervical retraction             PNG's- peripheral and C/S 15x             standing sh scap retraction/ext 20x 50% isometrics 20x5"                        diaphragmatic breathing supine 15x            ADIM marches 20x each 20x each            15 10           Ther Ex             Table slides sh flex, scaption  Wand overhead 15x           Ball roll on plinth 15 flex and abd 15x each                                                                             10 15           Ther Activity                                       Gait Training Modalities             MH 2 cervicals on R            CP

## 2021-02-11 ENCOUNTER — OFFICE VISIT (OUTPATIENT)
Dept: PHYSICAL THERAPY | Facility: MEDICAL CENTER | Age: 51
End: 2021-02-11
Payer: COMMERCIAL

## 2021-02-11 ENCOUNTER — OFFICE VISIT (OUTPATIENT)
Dept: FAMILY MEDICINE CLINIC | Facility: CLINIC | Age: 51
End: 2021-02-11
Payer: COMMERCIAL

## 2021-02-11 VITALS
HEART RATE: 83 BPM | HEIGHT: 69 IN | WEIGHT: 219.4 LBS | RESPIRATION RATE: 18 BRPM | OXYGEN SATURATION: 96 % | DIASTOLIC BLOOD PRESSURE: 80 MMHG | BODY MASS INDEX: 32.5 KG/M2 | SYSTOLIC BLOOD PRESSURE: 120 MMHG | TEMPERATURE: 97.9 F

## 2021-02-11 DIAGNOSIS — G54.0 THORACIC OUTLET SYNDROME OF RIGHT THORACIC OUTLET: Primary | ICD-10-CM

## 2021-02-11 DIAGNOSIS — L08.9 SKIN INFECTION: ICD-10-CM

## 2021-02-11 DIAGNOSIS — L30.9 DERMATITIS: Primary | ICD-10-CM

## 2021-02-11 PROCEDURE — 3008F BODY MASS INDEX DOCD: CPT | Performed by: FAMILY MEDICINE

## 2021-02-11 PROCEDURE — 97010 HOT OR COLD PACKS THERAPY: CPT

## 2021-02-11 PROCEDURE — 1036F TOBACCO NON-USER: CPT | Performed by: FAMILY MEDICINE

## 2021-02-11 PROCEDURE — 99214 OFFICE O/P EST MOD 30 MIN: CPT | Performed by: FAMILY MEDICINE

## 2021-02-11 PROCEDURE — 97112 NEUROMUSCULAR REEDUCATION: CPT

## 2021-02-11 PROCEDURE — 3725F SCREEN DEPRESSION PERFORMED: CPT | Performed by: FAMILY MEDICINE

## 2021-02-11 PROCEDURE — 97110 THERAPEUTIC EXERCISES: CPT

## 2021-02-11 PROCEDURE — 97140 MANUAL THERAPY 1/> REGIONS: CPT

## 2021-02-11 RX ORDER — CLINDAMYCIN PHOSPHATE 11.9 MG/ML
SOLUTION TOPICAL 2 TIMES DAILY
Qty: 30 ML | Refills: 0 | Status: SHIPPED | OUTPATIENT
Start: 2021-02-11

## 2021-02-11 RX ORDER — KETOCONAZOLE 20 MG/ML
1 SHAMPOO TOPICAL ONCE
Qty: 120 ML | Refills: 1 | Status: SHIPPED | OUTPATIENT
Start: 2021-02-11 | End: 2021-02-11

## 2021-02-11 NOTE — PROGRESS NOTES
Daily Note     Today's date: 2021  Patient name: Bonita Wood  : 1970  MRN: [id number]  Referring provider: Shavonne Navarrete DO  Dx:   Encounter Diagnosis     ICD-10-CM    1  Thoracic outlet syndrome of right thoracic outlet  G54 0                   Subjective: Pt reports hes been having a lot of headahches the past few days  Objective: See treatment diary below      Assessment: Tolerated treatment well  Patient exhibited good technique with therapeutic exercises and would benefit from continued PT  assessed Pt, activation of his SCM tends to increase pressure and pain to his facial nerve; demonstrated elf stretch for R SCM and reviewed scap retraction with depression to train UT to not compensate- pt able to perform both without triggering headache symptoms  Plan: Continue per plan of care        Precautions: DOS 21  HEP: cervical rotation, ext, flexion stretch; wand overhead AA flex, doorway stretch, diaphragmatic breathing, upper trunk rotation, PNG's median bias    Manuals 2/3  2/          Gentle cervical PROM 10 10 10          SCM STM 5  5 + scap mobs          Shoulder PROM/AA PNF patterns 5 10 15           20 20 35          Neuro Re-Ed             Cervical retraction             PNG's- peripheral and C/S 15x             standing sh scap retraction/ext 20x 50% isometrics 20x5"                        diaphragmatic breathing supine 15x            ADIM marches 20x each 20x each held           15 10           Ther Ex             Table slides sh flex, scaption  Wand overhead 15x 15x          Ball roll on plinth 15 flex and abd 15x each 15x each                                                                            10 15 10          Ther Activity                                       Gait Training                                       Modalities             MH 2 cervicals on R            CP

## 2021-02-11 NOTE — PROGRESS NOTES
Assessment/Plan:   1  Dermatitis  Reviewed patient's symptoms today  At this time, is unclear as to the exact cause of his scalp lesions  He has responded well to antibiotics  At this time, will start ketoconazole shampoo  He may continue with his clindamycin p r n     Due to the persistent nature of these symptoms, will refer patient to Dermatology for further evaluation   - Ambulatory referral to Dermatology; Future  - ketoconazole (NIZORAL) 2 % shampoo; Apply 1 application topically once for 1 dose  Dispense: 120 mL; Refill: 1  - clindamycin (CLEOCIN T) 1 % external solution; Apply topically 2 (two) times a day  Dispense: 30 mL; Refill: 0           There are no diagnoses linked to this encounter  Subjective:       Chief Complaint   Patient presents with    Follow-up     scalp issue  Pt would like to go back to the previous cream he was using      Patient ID: Debra Hutchinson is a 44 y o  male patient is a 70-year-old male presents today for a follow-up on his scalp he agent  She would like she probably he has been using his Bactroban cream however this has not been effective  He had better relief is with clindamycin    HPI    Review of Systems   Constitutional: Negative for activity change, chills, fatigue and fever  HENT: Negative for congestion, ear pain, sinus pressure and sore throat  Eyes: Negative for redness, itching and visual disturbance  Respiratory: Negative for cough and shortness of breath  Cardiovascular: Negative for chest pain and palpitations  Gastrointestinal: Negative for abdominal pain, diarrhea and nausea  Endocrine: Negative for cold intolerance and heat intolerance  Genitourinary: Negative for dysuria, flank pain and frequency  Musculoskeletal: Negative for arthralgias, back pain, gait problem and myalgias  Skin: Negative for color change  Allergic/Immunologic: Negative for environmental allergies     Neurological: Negative for dizziness, numbness and headaches  Psychiatric/Behavioral: Negative for behavioral problems and sleep disturbance  The following portions of the patient's history were reviewed and updated as appropriate : past family history, past medical history, past social history and past surgical history      Current Outpatient Medications:     cetirizine (ZyrTEC) 10 mg tablet, Take 10 mg by mouth as needed  , Disp: , Rfl:     lisinopril (ZESTRIL) 10 mg tablet, Take 1 tablet (10 mg total) by mouth daily, Disp: 30 tablet, Rfl: 3    Multiple Vitamin (MULTIVITAMIN) capsule, Take 1 capsule by mouth daily, Disp: , Rfl:     mupirocin (BACTROBAN) 2 % ointment, Apply topically 3 (three) times a day, Disp: 22 g, Rfl: 0    omeprazole (PriLOSEC) 40 MG capsule, Take 1 capsule (40 mg total) by mouth every 12 (twelve) hours (Patient taking differently: Take 40 mg by mouth daily as needed ), Disp: 60 capsule, Rfl: 5    pregabalin (LYRICA) 50 mg capsule, Take 1 capsule (50 mg total) by mouth 2 (two) times a day (Patient taking differently: Take 50 mg by mouth daily ), Disp: 60 capsule, Rfl: 0    amitriptyline (ELAVIL) 25 mg tablet, Take 25 mg by mouth daily at bedtime, Disp: , Rfl:     benzonatate (TESSALON) 200 MG capsule, Take 1 capsule (200 mg total) by mouth 3 (three) times a day as needed for cough (Patient not taking: Reported on 2/11/2021), Disp: 20 capsule, Rfl: 0    clindamycin (CLEOCIN T) 1 % external solution, Apply topically 2 (two) times a day (Patient not taking: Reported on 2/11/2021), Disp: 30 mL, Rfl: 0    NIFEdipine (PROCARDIA XL) 60 mg 24 hr tablet, Take 60 mg by mouth daily, Disp: , Rfl:     Probiotic Product (PROBIOTIC ADVANCED PO), Take by mouth daily, Disp: , Rfl:     traMADol (ULTRAM) 50 mg tablet, , Disp: , Rfl:          Objective:         Vitals:    02/11/21 1607   BP: 120/80   BP Location: Right arm   Patient Position: Sitting   Cuff Size: Large   Pulse: 83   Resp: 18   Temp: 97 9 °F (36 6 °C)   TempSrc: Tympanic   SpO2: 96%   Weight: 99 5 kg (219 lb 6 4 oz)   Height: 5' 9" (1 753 m)     Physical Exam  Vitals signs reviewed  Constitutional:       Appearance: He is well-developed  HENT:      Head: Normocephalic and atraumatic  Nose: Nose normal       Mouth/Throat:      Pharynx: No oropharyngeal exudate  Eyes:      General: No scleral icterus  Right eye: No discharge  Left eye: No discharge  Pupils: Pupils are equal, round, and reactive to light  Neck:      Musculoskeletal: Normal range of motion and neck supple  Trachea: No tracheal deviation  Cardiovascular:      Rate and Rhythm: Normal rate and regular rhythm  Pulses:           Dorsalis pedis pulses are 2+ on the right side and 2+ on the left side  Posterior tibial pulses are 2+ on the right side and 2+ on the left side  Heart sounds: Normal heart sounds  No murmur  No friction rub  No gallop  Pulmonary:      Effort: Pulmonary effort is normal  No respiratory distress  Breath sounds: Normal breath sounds  No wheezing or rales  Abdominal:      General: Bowel sounds are normal  There is no distension  Palpations: Abdomen is soft  Tenderness: There is no abdominal tenderness  There is no guarding or rebound  Musculoskeletal: Normal range of motion  Lymphadenopathy:      Head:      Right side of head: No submental or submandibular adenopathy  Left side of head: No submental or submandibular adenopathy  Cervical: No cervical adenopathy  Right cervical: No superficial, deep or posterior cervical adenopathy  Left cervical: No superficial, deep or posterior cervical adenopathy  Skin:     General: Skin is warm and dry  Findings: No erythema  Neurological:      Mental Status: He is alert and oriented to person, place, and time  Cranial Nerves: No cranial nerve deficit  Sensory: No sensory deficit  Psychiatric:         Mood and Affect: Mood is not anxious or depressed  Speech: Speech normal          Behavior: Behavior normal          Thought Content:  Thought content normal          Judgment: Judgment normal

## 2021-02-16 ENCOUNTER — OFFICE VISIT (OUTPATIENT)
Dept: PHYSICAL THERAPY | Facility: MEDICAL CENTER | Age: 51
End: 2021-02-16
Payer: COMMERCIAL

## 2021-02-16 DIAGNOSIS — G54.0 THORACIC OUTLET SYNDROME OF RIGHT THORACIC OUTLET: Primary | ICD-10-CM

## 2021-02-16 PROCEDURE — 97140 MANUAL THERAPY 1/> REGIONS: CPT

## 2021-02-16 PROCEDURE — 97112 NEUROMUSCULAR REEDUCATION: CPT

## 2021-02-16 NOTE — PROGRESS NOTES
Daily Note     Today's date: 2021  Patient name: Lexii Suggs  : 1970  MRN: [id number]  Referring provider: Elin Mims DO  Dx:   Encounter Diagnosis     ICD-10-CM    1  Thoracic outlet syndrome of right thoracic outlet  G54 0                   Subjective: Pt reports his headaches have gotten worse; has been wearing corrective brace for back to keep his shoulders back  hasnt made it worse  Called doctor but hasn't heard back  Also has TP along his MT       Objective: See treatment diary below      Assessment: Tolerated treatment well  Patient exhibited good technique with therapeutic exercises and would benefit from continued PT Pt has possible nerve entrapment of tension to his cranial nerves  Performed more cervical manuals- cervical traction and extension with rotation and lateral flexion toward L side ( contra); also performed a chin lift  Pt reported improvement in symptoms for his head and his scapula  Demonstrated how pt could perform indpendently    Plan: Continue per plan of care        Precautions: DOS 21  HEP: cervical rotation, ext, flexion stretch; wand overhead AA flex, doorway stretch, diaphragmatic breathing, upper trunk rotation, PNG's median bias    Manuals 2/3 2 2 216         Gentle cervical PROM 10 10 10 20         SCM STM 5  5 + scap mobs 10         Shoulder PROM/AA PNF patterns 5 10 15 10 + PNG          20 20 35 55         Neuro Re-Ed             Cervical retraction             PNG's- peripheral and C/S 15x             standing sh scap retraction/ext 20x 50% isometrics 20x5"                        diaphragmatic breathing supine 15x            ADIM marches 20x each 20x each held           15 10           Ther Ex             Table slides sh flex, scaption  Wand overhead 15x 15x          Ball roll on plinth 15 flex and abd 15x each 15x each                                                                            10 15 10          Ther Activity Gait Training                                       Modalities             MH 2 cervicals on R   10 min         CP

## 2021-02-18 ENCOUNTER — OFFICE VISIT (OUTPATIENT)
Dept: PHYSICAL THERAPY | Facility: MEDICAL CENTER | Age: 51
End: 2021-02-18
Payer: COMMERCIAL

## 2021-02-18 DIAGNOSIS — G54.0 THORACIC OUTLET SYNDROME OF RIGHT THORACIC OUTLET: Primary | ICD-10-CM

## 2021-02-18 PROCEDURE — 97112 NEUROMUSCULAR REEDUCATION: CPT

## 2021-02-18 PROCEDURE — 97140 MANUAL THERAPY 1/> REGIONS: CPT

## 2021-02-18 PROCEDURE — 97010 HOT OR COLD PACKS THERAPY: CPT

## 2021-02-18 NOTE — PROGRESS NOTES
Daily Note     Today's date: 2021  Patient name: Melania Dubose  : 1970  MRN: [id number]  Referring provider: Burma Goldberg, DO  Dx:   Encounter Diagnosis     ICD-10-CM    1  Thoracic outlet syndrome of right thoracic outlet  G54 0                   Subjective: Pt reports he used his cervical traction machine at home twice  Tingling into face has gone away  Still getting pain into R eye with rotation to R  Extension and L lateral flexion stretch with gentle pull to his jawline has been helpful  Objective: See treatment diary below      Assessment: Tolerated treatment well  Patient exhibited good technique with therapeutic exercises and would benefit from continued PT   Pt had relief with L lateral flexion and extension- able to tolerate added depression to clavicle and [facility]  Worked on trapezius isolation for both MT and LT while preventing UT and elevator activation- pt was successful but power output was weak  Added BP cuff exercise to HEP- helped provide a visible cueing for middle trapezius activation    Plan: Continue per plan of care        Precautions: DOS 21  HEP: cervical rotation, ext, flexion stretch; wand overhead AA flex, doorway stretch, diaphragmatic breathing, upper trunk rotation, PNG's median bias    Manuals 2/3 2/9 2/11 2/16 2/18        Gentle cervical PROM 10 10 10 20 20        SCM STM 5  5 + scap mobs 10 10        Shoulder PROM/AA PNF patterns 5 10 15 10 + PNG 10         20 20 35 55 40        Neuro Re-Ed             Cervical retraction             PNG's- peripheral and C/S 15x             standing sh scap retraction/ext 20x 50% isometrics 20x5"                        diaphragmatic breathing supine 15x            ADIM marches 20x each 20x each held           15 10           Ther Ex             Table slides sh flex, scaption  Wand overhead 15x 15x          Ball roll on plinth 15 flex and abd 15x each 15x each 10 15 10          Ther Activity                                       Gait Training                                       Modalities             MH 2 cervicals on R   10 min 10 min        CP

## 2021-02-23 ENCOUNTER — OFFICE VISIT (OUTPATIENT)
Dept: PHYSICAL THERAPY | Facility: MEDICAL CENTER | Age: 51
End: 2021-02-23
Payer: COMMERCIAL

## 2021-02-23 DIAGNOSIS — G54.0 THORACIC OUTLET SYNDROME OF RIGHT THORACIC OUTLET: Primary | ICD-10-CM

## 2021-02-23 PROCEDURE — 97140 MANUAL THERAPY 1/> REGIONS: CPT

## 2021-02-23 PROCEDURE — 97112 NEUROMUSCULAR REEDUCATION: CPT

## 2021-02-23 PROCEDURE — 97110 THERAPEUTIC EXERCISES: CPT

## 2021-02-23 PROCEDURE — 97010 HOT OR COLD PACKS THERAPY: CPT

## 2021-02-23 NOTE — PROGRESS NOTES
Daily Note     Today's date: 2021  Patient name: Binh Breen  : 1970  MRN: [id number]  Referring provider: Desiree Beebe DO  Dx:   Encounter Diagnosis     ICD-10-CM    1  Thoracic outlet syndrome of right thoracic outlet  G54 0                   Subjective: Had to reschedule 's appt due to the weather  Still having trouble sleeping, looking into different pillows      Objective: See treatment diary below      Assessment: Tolerated treatment well  Patient exhibited good technique with therapeutic exercises and would benefit from continued PT   Performed TP release to distal insertion of SCM  Pt found relief after cervical manuals  Improved isolation of MT and LT muscle activation  Added sh extension with contralateral flexion for a stretch, pt tolerated well  also added sh ER with 1/2 foam roll to activate RC musculature while also keep good posture  Pt reported improved symptoms post session  Plan: Continue per plan of care        Precautions: DOS 21  HEP: cervical rotation, ext, flexion stretch; wand overhead AA flex, doorway stretch, diaphragmatic breathing, upper trunk rotation, PNG's median bias    Manuals 2/3 2/9 2/11 2/16 2/18 2/23       Gentle cervical PROM 10 10 10 20 20 15       SCM STM 5  5 + scap mobs 10 10 10       Shoulder PROM/AA PNF patterns 5 10 15 10 + PNG 10 10        20 20 35 55 40 35       Neuro Re-Ed             Cervical retraction             PNG's- peripheral and C/S 15x             standing sh scap retraction/ext 20x 50% isometrics 20x5"    BP cuff MT and LT isomeitrcs 10 mmHg             1/2 foam roll sh ER against wall 20x       diaphragmatic breathing supine 15x            ADIM marches 20x each 20x each held           15 10    10       Ther Ex             Table slides sh flex, scaption  Wand overhead 15x 15x          Ball roll on plinth 15 flex and abd 15x each 15x each   15x             Cane sh ext stretch 5 x20" 10 15 10   10       Ther Activity                                       Gait Training                                       Modalities             MH 2 cervicals on R   10 min 10 min 10 min       CP

## 2021-02-25 ENCOUNTER — OFFICE VISIT (OUTPATIENT)
Dept: PHYSICAL THERAPY | Facility: MEDICAL CENTER | Age: 51
End: 2021-02-25
Payer: COMMERCIAL

## 2021-02-25 DIAGNOSIS — G54.0 THORACIC OUTLET SYNDROME OF RIGHT THORACIC OUTLET: Primary | ICD-10-CM

## 2021-02-25 PROCEDURE — 97010 HOT OR COLD PACKS THERAPY: CPT

## 2021-02-25 PROCEDURE — 97140 MANUAL THERAPY 1/> REGIONS: CPT

## 2021-02-25 NOTE — PROGRESS NOTES
Daily Note     Today's date: 2021  Patient name: Carroll Rivera  : 1970  MRN: [id number]  Referring provider: Víctor Chang DO  Dx:   Encounter Diagnosis     ICD-10-CM    1  Thoracic outlet syndrome of right thoracic outlet  G54 0                   Subjective: Pt reports he thinks he overdid the foam roll exercise, his eye pain is back but still no tingling  However he feels like he feels more stable in his shoulder and his neck which he hasnt felt in awhile  Objective: See treatment diary below      Assessment: Tolerated treatment well  Patient exhibited good technique with therapeutic exercises and would benefit from continued PT  Pt still has SCM tension observed at the distal insertion but improved lengthening of tissue with contralateral flexion and ext  Pt tolerated exercises well, minor improvement in symptoms post session  Plan: Continue per plan of care        Precautions: DOS 21  HEP: cervical rotation, ext, flexion stretch; wand overhead AA flex, doorway stretch, diaphragmatic breathing, upper trunk rotation, PNG's median bias    Manuals 2/3 2/9 2/11 2/16 2/18 2/23 2/25      Gentle cervical PROM 10 10 10 20 20 15 10      SCM STM 5  5 + scap mobs 10 10 10 5      Shoulder PROM/AA PNF patterns 5 10 15 10 + PNG 10 10 10       20 20 35 55 40 35 25      Neuro Re-Ed             Cervical retraction             PNG's- peripheral and C/S 15x             standing sh scap retraction/ext 20x 50% isometrics 20x5"    BP cuff MT and LT isomeitrcs 10 mmHg 20x5"            1/2 foam roll sh ER against wall 20x 20x5"      diaphragmatic breathing supine 15x            ADIM marches 20x each 20x each held           15 10    10 10      Ther Ex             Table slides sh flex, scaption  Wand overhead 15x 15x          Ball roll on plinth 15 flex and abd 15x each 15x each   15x 15x            Cane sh ext stretch 5 x20" 20x5"                                                           10 15 10   10 10 Ther Activity                                       Gait Training                                       Modalities             MH 2 cervicals on R   10 min 10 min 10 min 10 min      CP

## 2021-03-02 ENCOUNTER — OFFICE VISIT (OUTPATIENT)
Dept: PHYSICAL THERAPY | Facility: MEDICAL CENTER | Age: 51
End: 2021-03-02
Payer: COMMERCIAL

## 2021-03-02 DIAGNOSIS — G54.0 THORACIC OUTLET SYNDROME OF RIGHT THORACIC OUTLET: Primary | ICD-10-CM

## 2021-03-02 PROCEDURE — 97112 NEUROMUSCULAR REEDUCATION: CPT

## 2021-03-02 PROCEDURE — 97140 MANUAL THERAPY 1/> REGIONS: CPT

## 2021-03-02 PROCEDURE — 97010 HOT OR COLD PACKS THERAPY: CPT

## 2021-03-02 NOTE — PROGRESS NOTES
Daily Note /Re-evaluation    Today's date: 3/2/2021  Patient name: Lucrecia Seaman  : 1970  MRN: [id number]  Referring provider: Jefferson Jonas DO  Dx:   Encounter Diagnosis     ICD-10-CM    1  Thoracic outlet syndrome of right thoracic outlet  G54 0                   Subjective: Pt reports he was feeling good over weekend; decided to place tile in kitchen for backsplash; had a lot of pain into shoulder thereafter including return of numbness into lateral face  Objective: See treatment diary below      Assessment: Tolerated treatment well  Patient exhibited good technique with therapeutic exercises and would benefit from continued PT  Pt had increased tension to pectoralis major/minor  No significant loss of cervical ROM; decreased tension to R SCM  Pt also had TP along R paraspinals- along C3-C4 level  Improved isolated activation of MT and LT, preventing UT activation  PROM R Sh ER 60  ULTT- no symptoms but tension with full ER, full wrist extension, elbow extension to 35 degrees- post PNG's  Goals  STG (2-3 weeks)  1: pt independent in HEP- met  2: improve cervical mobility by 25%- met  3: improve neural mobility by 25%-emet  4: full passive ROM shoulder WNL- partially met, ER limited    LTG (4-6 weeks)  1: improve FOTO 10-15 pts- not assessed  2: Pt able to perform overhead activities without limitation- not met  3: reduce frequency/intensity of cervicogenic headaches by 25%- not met  4: RC/periscapular strength 4 to 4+/5- not met  5: Pt able to perform dyanmic exercises with efficient core activation- partially met  6: thoracic rotation WNL- met  7: improve neural mobility by 50%- partially met    Plan: Continue per plan of care        Precautions: DOS 21  HEP: cervical rotation, ext, flexion stretch; wand overhead AA flex, doorway stretch, diaphragmatic breathing, upper trunk rotation, PNG's median bias    Manuals 2/3 2/9 2/11 2/16 2/18 2/23 2/25 3/2     Gentle cervical PROM 10 10 10 20 20 15 10 10     SCM STM 5  5 + scap mobs 10 10 10 5 5     Shoulder PROM/AA PNF patterns 5 10 15 10 + PNG 10 10 10 10      20 20 35 55 40 35 25 25     Neuro Re-Ed             Cervical retraction             PNG's- peripheral and C/S 15x             standing  scap retraction/ext 20x 50% isometrics 20x5"    BP cuff MT and LT isomeitrcs 10 mmHg 20x5" 20x5"           1/2 foam roll  ER against wall 20x 20x5" 20x5"     diaphragmatic breathing supine 15x            ADIM marches 20x each 20x each held           15 10    10 10 10     Ther Ex             Table slides sh flex, scaption  Wand overhead 15x 15x          Ball roll on plinth 15 flex and abd 15x each 15x each   15x 15x 15x           Cane sh ext stretch 5 x20" 20x5" 20x5"                                                          10 15 10   10 10 10     Ther Activity                                       Gait Training                                       Modalities              2 cervicals on R   10 min 10 min 10 min 10 min 10 min     CP

## 2021-03-04 ENCOUNTER — OFFICE VISIT (OUTPATIENT)
Dept: PHYSICAL THERAPY | Facility: MEDICAL CENTER | Age: 51
End: 2021-03-04
Payer: COMMERCIAL

## 2021-03-04 DIAGNOSIS — G54.0 THORACIC OUTLET SYNDROME OF RIGHT THORACIC OUTLET: Primary | ICD-10-CM

## 2021-03-04 PROCEDURE — 97110 THERAPEUTIC EXERCISES: CPT

## 2021-03-04 PROCEDURE — 97010 HOT OR COLD PACKS THERAPY: CPT

## 2021-03-04 PROCEDURE — 97112 NEUROMUSCULAR REEDUCATION: CPT

## 2021-03-04 PROCEDURE — 97140 MANUAL THERAPY 1/> REGIONS: CPT

## 2021-03-04 NOTE — PROGRESS NOTES
Daily Note     Today's date: 3/4/2021  Patient name: Todd Maher  : 1970  MRN: [id number]  Referring provider: Yancy Farfan DO  Dx:   Encounter Diagnosis     ICD-10-CM    1  Thoracic outlet syndrome of right thoracic outlet  G54 0                   Subjective: Pt reports he feels his shoulder is improving and feeling more stable  Objective: See treatment diary below      Assessment: Tolerated treatment well  Patient exhibited good technique with therapeutic exercises and would benefit from continued PT   Pt had slight increase in tension for his SCM and also his latissimus  Showed pt how to stretch on his own  Pt had significant improvement in symptoms after stretching and manuals    Plan: Continue per plan of care        Precautions: DOS 21  HEP: cervical rotation, ext, flexion stretch; wand overhead AA flex, doorway stretch, diaphragmatic breathing, upper trunk rotation, PNG's median bias    Manuals 2/3 2/9 2/11 2/16 2/18 2/23 2/25 3 3/    Gentle cervical PROM 10 10 10 20 20 15 10 10 10    SCM STM 5  5 + scap mobs 10 10 10 5 5 5    Shoulder PROM/AA PNF patterns 5 10 15 10 + PNG 10 10 10 10 10     20 20 35 55 40 35 25 25 25    Neuro Re-Ed             Cervical retraction             PNG's- peripheral and C/S 15x             standing sh scap retraction/ext 20x 50% isometrics 20x5"    BP cuff MT and LT isomeitrcs 10 mmHg 20x5" 20x5" 20x5"          1/2 foam roll sh ER against wall 20x 20x5" 20x5" 20x5"    diaphragmatic breathing supine 15x            ADIM marches 20x each 20x each held           15 10    10 10 10 10    Ther Ex             Table slides sh flex, scaption  Wand overhead 15x 15x          Ball roll on plinth 15 flex and abd 15x each 15x each   15x 15x 15x 15x + latissimus stretch          Cane sh ext stretch 5 x20" 20x5" 20x5" 20x5"                                                         10 15 10   10 10 10 10    Ther Activity                                       Gait Training Modalities             MH 2 cervicals on R   10 min 10 min 10 min 10 min 10 min 10 min    CP

## 2021-03-09 ENCOUNTER — OFFICE VISIT (OUTPATIENT)
Dept: PHYSICAL THERAPY | Facility: MEDICAL CENTER | Age: 51
End: 2021-03-09
Payer: COMMERCIAL

## 2021-03-09 DIAGNOSIS — G54.0 THORACIC OUTLET SYNDROME OF RIGHT THORACIC OUTLET: Primary | ICD-10-CM

## 2021-03-09 PROCEDURE — 97110 THERAPEUTIC EXERCISES: CPT

## 2021-03-09 PROCEDURE — 97010 HOT OR COLD PACKS THERAPY: CPT

## 2021-03-09 PROCEDURE — 97112 NEUROMUSCULAR REEDUCATION: CPT

## 2021-03-09 PROCEDURE — 97140 MANUAL THERAPY 1/> REGIONS: CPT

## 2021-03-09 NOTE — PROGRESS NOTES
Daily Note     Today's date: 3/9/2021  Patient name: Rylan Woodall  : 1970  MRN: [id number]  Referring provider: Sirisha De La Fuente DO  Dx:   Encounter Diagnosis     ICD-10-CM    1  Thoracic outlet syndrome of right thoracic outlet  G54 0                   Subjective: Pt reported he is feeling good  latissimus stretch has really made a difference  Did some work over the weekend just made sure to take breaks  Woke up with hardly nay soreness, just mild tightness  Objective: See treatment diary below      Assessment: Tolerated treatment well  Patient exhibited good technique with therapeutic exercises and would benefit from continued PT   Added standing scapular retraction and sh ext- to replace BP cuff isometric- pt able to perform without compensations with cervical musculature  Mild tension to R SCM; improved neural mobility; approaching full tension in median bias  Plan: Continue per plan of care        Precautions: DOS 21  HEP: cervical rotation, ext, flexion stretch; wand overhead AA flex, doorway stretch, diaphragmatic breathing, upper trunk rotation, PNG's median bias    Manuals 2/3 2/9 2/11 2/16 2/18 2/23 2/25 3 3/4 3/9   Gentle cervical PROM 10 10 10 20 20 15 10 10 10 10   SCM STM 5  5 + scap mobs 10 10 10 5 5 5 5   Shoulder PROM/AA PNF patterns 5 10 15 10 + PNG 10 10 10 10 10 5    20 20 35 55 40 35 25 25 25 20   Neuro Re-Ed             Cervical retraction             PNG's- peripheral and C/S 15x             standing sh scap retraction/ext 20x 50% isometrics 20x5"    BP cuff MT and LT isomeitrcs 10 mmHg 20x5" 20x5" 20x5" retraction/ext 20x5"         1/2 foam roll sh ER against wall 20x 20x5" 20x5" 20x5" 20x5"   diaphragmatic breathing supine 15x            ADIM marches 20x each 20x each held           15 10    10 10 10 10 10   Ther Ex             Table slides sh flex, scaption  Wand overhead 15x 15x          Ball roll on plinth 15 flex and abd 15x each 15x each   15x 15x 15x 15x + latissimus stretch 15x5"         Cane sh ext stretch 5 x20" 20x5" 20x5" 20x5" 15x5"                                                        10 15 10   10 10 10 10 10   Ther Activity                                       Gait Training                                       Modalities             MH 2 cervicals on R   10 min 10 min 10 min 10 min 10 min 10 min 10 min   CP

## 2021-03-11 ENCOUNTER — OFFICE VISIT (OUTPATIENT)
Dept: PHYSICAL THERAPY | Facility: MEDICAL CENTER | Age: 51
End: 2021-03-11
Payer: COMMERCIAL

## 2021-03-11 DIAGNOSIS — G54.0 THORACIC OUTLET SYNDROME OF RIGHT THORACIC OUTLET: Primary | ICD-10-CM

## 2021-03-11 PROCEDURE — 97110 THERAPEUTIC EXERCISES: CPT

## 2021-03-11 PROCEDURE — 97010 HOT OR COLD PACKS THERAPY: CPT

## 2021-03-11 PROCEDURE — 97140 MANUAL THERAPY 1/> REGIONS: CPT

## 2021-03-11 NOTE — PROGRESS NOTES
Daily Note     Today's date: 3/11/2021  Patient name: Rylan Woodall  : 1970  MRN: [id number]  Referring provider: Sirisha De La Fuente DO  Dx:   Encounter Diagnosis     ICD-10-CM    1  Thoracic outlet syndrome of right thoracic outlet  G54 0                   Subjective: Pt reported he has been feeling good overall  He feels like he is gaining back strength in his R shoulder  Objective: See treatment diary below      Assessment: Tolerated treatment well  Patient exhibited good technique with therapeutic exercises and would benefit from continued PT  Pt has significant decrease in SCM tension, no restriction felt with combined cervical lateral flexion and extension or R rotation, L lateral flexion and extension  Added resistance exercises to program for shoulder and postural strengthening  Also added neuromuscular control exercise to incorportate LE movement while keeping his core stable- pt reported he wants to try at hoome- emphasized not overdoing his exercises  Stretching lattisumus still has been helping with facial n peripheral pain  Plan: Continue per plan of care        Precautions: DOS 21  HEP: cervical rotation, ext, flexion stretch; wand overhead AA flex, doorway stretch, diaphragmatic breathing, upper trunk rotation, PNG's median bias    Manuals 3/11            Gentle cervical PROM 10            SCM STM 5            Shoulder PROM/AA PNF patterns 10             25            Neuro Re-Ed                                                                                                        Ther Ex             TB row Red 2x10             TB robberies Red 2x10            TB lawnmower Red 2x10            TB extension Red 2x10            TB PNF D2 Red 2x10            Standing scaption 2# 20x            Standing scaption held with LE michel 15x             25            Ther Activity                                       Gait Training                                       Modalities MH 10         10 min   CP

## 2021-03-16 ENCOUNTER — OFFICE VISIT (OUTPATIENT)
Dept: PHYSICAL THERAPY | Facility: MEDICAL CENTER | Age: 51
End: 2021-03-16
Payer: COMMERCIAL

## 2021-03-16 DIAGNOSIS — G54.0 THORACIC OUTLET SYNDROME OF RIGHT THORACIC OUTLET: Primary | ICD-10-CM

## 2021-03-16 PROCEDURE — 97010 HOT OR COLD PACKS THERAPY: CPT

## 2021-03-16 PROCEDURE — 97112 NEUROMUSCULAR REEDUCATION: CPT

## 2021-03-16 PROCEDURE — 97140 MANUAL THERAPY 1/> REGIONS: CPT

## 2021-03-16 NOTE — PROGRESS NOTES
Daily Note     Today's date: 3/16/2021  Patient name: Ellen Dunn  : 1970  MRN: [id number]  Referring provider: Marvin Callahan DO  Dx:   Encounter Diagnosis     ICD-10-CM    1  Thoracic outlet syndrome of right thoracic outlet  G54 0                   Subjective: Pt reported he felt that he overdid it last session, even his headaches came back  Got a new pillow- copperhead blaze pillow, feels like its making a difference  Objective: See treatment diary below      Assessment: Tolerated treatment well  Patient exhibited good technique with therapeutic exercises and would benefit from continued PT  Pt had some irritation along lateral portion of pectoralis- relieved after TP release and stretching  Full median nerve tension- able to achieve elbow extension to 15 degrees before tension is felt  Pt had minor increase in tension to SCM but able to reduce after manual stretching  Decreased resistance for TB exercises  Also added supine marching with chest press  Pt reported he felt better after session, no complaints         Plan: Continue per plan of care        Precautions: DOS 21  HEP: cervical rotation, ext, flexion stretch; wand overhead AA flex, doorway stretch, diaphragmatic breathing, upper trunk rotation, PNG's median bias    Manuals 3/11 3/16           Gentle cervical PROM 10 10           SCM STM 5 5           Shoulder PROM/AA PNF patterns 10 10            25 25           Neuro Re-Ed                                                                                                        Ther Ex             TB row Red 2x10 Yellow 2x10            TB robberies Red 2x10 NP           TB lawnmower Red 2x10 Yellow 2x10           TB extension Red 2x10 Yellow 20x           TB PNF D2 Red 2x10 Yellow 20x           Standing scaption 2# 20x            Standing scaption held with LE marches 15x            Supine LE marches with chest press  2# 2x10           Standing ER- foam roll against wall  2# 2x10 Ball roll on plinth/lat stretch  15x each            25 20 uncharged           Ther Activity                                       Gait Training                                       Modalities             MH 10 10        10 min   CP

## 2021-03-18 ENCOUNTER — TRANSCRIBE ORDERS (OUTPATIENT)
Dept: PHYSICAL THERAPY | Facility: MEDICAL CENTER | Age: 51
End: 2021-03-18

## 2021-03-18 ENCOUNTER — OFFICE VISIT (OUTPATIENT)
Dept: PHYSICAL THERAPY | Facility: MEDICAL CENTER | Age: 51
End: 2021-03-18
Payer: COMMERCIAL

## 2021-03-18 DIAGNOSIS — G54.0 THORACIC OUTLET SYNDROME OF RIGHT THORACIC OUTLET: Primary | ICD-10-CM

## 2021-03-18 PROCEDURE — 97112 NEUROMUSCULAR REEDUCATION: CPT

## 2021-03-18 PROCEDURE — 97140 MANUAL THERAPY 1/> REGIONS: CPT

## 2021-03-18 PROCEDURE — 97110 THERAPEUTIC EXERCISES: CPT

## 2021-03-18 PROCEDURE — 97010 HOT OR COLD PACKS THERAPY: CPT

## 2021-03-18 NOTE — LETTER
2021    Wilman Amado, [address] [place] [address]    Patient: Khadar Yang   YOB: 1970   Date of Visit: 3/18/2021     Encounter Diagnosis     ICD-10-CM    1  Thoracic outlet syndrome of right thoracic outlet  G54 0        Dear Dr [place] Captain:    Thank you for your recent referral of Khadar Yang  Please review the attached evaluation summary from David's recent visit  Please verify that you agree with the plan of care by signing the attached order  If you have any questions or concerns, please do not hesitate to call  I sincerely appreciate the opportunity to share in the care of one of your patients and hope to have another opportunity to work with you in the near future  Sincerely,    Sarika Thurston, PT      Referring Provider:      I certify that I have read the below Plan of Care and certify the need for these services furnished under this plan of treatment while under my care  Wilman Amado MD  [address] [place] [address]  Via Fax: [phone]          Daily Note/Re-evaluation     Today's date: 3/18/2021  Patient name: Khadar Yang  : 1970  MRN: [id number]  Referring provider: Wilman Amado MD  Dx:   Encounter Diagnosis     ICD-10-CM    1  Thoracic outlet syndrome of right thoracic outlet  G54 0                   Subjective: Pt reports his headaches has significantly lessened in frequency and intensity  No longer getting numbness across his face  Hasn't had any peripheral issues into his hand aside from incisional soreness with pressure applied to palm  Objective: See treatment diary below      Assessment: Tolerated treatment well   Patient exhibited good technique with therapeutic exercises and would benefit from continued PT     No symptoms with combined cervical extension and L lateral flexion and flexion and R lateral flexion as before  - Spurlings, compression  SCM has decreased palpable tension at insertion  Pt had reproducible symptoms with sh abd in neutral, wrist is full extension, elbow extended, forearm supinated and sh ER when testing for active neural tension  Corrected pt to supinate more in his forearm vs  Full ER able to raise his arm in 75 degrees abduction before he felt tension, no parasthesias reported  ULTT - full ER, wrist extension, supination, elbow extension to 15 degrees- tension no prasthesias  RC strength 4 to 4+/5  MT/LT strength 4-/5 BL  Pt has improved in reducing UT compensation with sh elevation or scapular retraction    Goals  STG (2-3 weeks)  1: pt independent in HEP- met  2: improve cervical mobility by 25%- met  3: improve neural mobility by 25%- met  4: full passive ROM shoulder WNL- met    LTG (4-6 weeks)  1: improve FOTO 10-15 pts- met  2: Pt able to perform overhead activities without limitation- not met  3: reduce frequency/intensity of cervicogenic headaches by 25%- met  4: RC/periscapular strength 4 to 4+/5- met  5: Pt able to perform dyanmic exercises with efficient core activation- met  6: thoracic rotation WNL- met  7: improve neural mobility by 50%- met      Plan: Continue per plan of care   Continue for another 6 weeks to further his functional strength and improve his neuromuscular control     Precautions: DOS 1/14/21  HEP: cervical rotation, ext, flexion stretch; wand overhead AA flex, doorway stretch, diaphragmatic breathing, upper trunk rotation, PNG's median bias    Manuals 3/11 3/16 3/18          Gentle cervical PROM supine and added ext off table 10 10 10          SCM STM 5 5 5          Shoulder PROM/AA /pec stretch PNGs 10 10 10           25 25 25          Neuro Re-Ed                                                                                                        Ther Ex             TB row Red 2x10 Yellow 2x10 Yellow 2x10           TB robberies Red 2x10 NP           TB lawnmower Red 2x10 Yellow 2x10 Yellow 2x10          TB extension Red 2x10 Yellow 20x Yellow 2x10          TB PNF D2 Red 2x10 Yellow 20x Yellow 2x10          Standing scaption 2# 20x            Standing scaption held with RG pearson 15x            Supine RG pearson with chest press  2# 2x10 2# 2x10          Standing ER- foam roll against wall  2# 2x10 2# 2x10          Ball roll on plinth/lat stretch  15x each 15x each           25 20 uncharged 10/10          Ther Activity                                       Gait Training                                       Modalities             MH 10 10 10       10 min   CP

## 2021-03-18 NOTE — PROGRESS NOTES
Daily Note/Re-evaluation     Today's date: 3/18/2021  Patient name: Todd Maher  : 1970  MRN: [id number]  Referring provider: Verna Moreno MD  Dx:   Encounter Diagnosis     ICD-10-CM    1  Thoracic outlet syndrome of right thoracic outlet  G54 0                   Subjective: Pt reports his headaches has significantly lessened in frequency and intensity  No longer getting numbness across his face  Hasn't had any peripheral issues into his hand aside from incisional soreness with pressure applied to palm  Objective: See treatment diary below      Assessment: Tolerated treatment well   Patient exhibited good technique with therapeutic exercises and would benefit from continued PT     No symptoms with combined cervical extension and L lateral flexion and flexion and R lateral flexion as before  - Spurlings, compression  SCM has decreased palpable tension at insertion  Pt had reproducible symptoms with sh abd in neutral, wrist is full extension, elbow extended, forearm supinated and sh ER when testing for active neural tension  Corrected pt to supinate more in his forearm vs  Full ER able to raise his arm in 75 degrees abduction before he felt tension, no parasthesias reported  ULTT - full ER, wrist extension, supination, elbow extension to 15 degrees- tension no prasthesias  RC strength 4 to 4+/5  MT/LT strength 4-/5 BL  Pt has improved in reducing UT compensation with sh elevation or scapular retraction    Goals  STG (2-3 weeks)  1: pt independent in HEP- met  2: improve cervical mobility by 25%- met  3: improve neural mobility by 25%- met  4: full passive ROM shoulder WNL- met    LTG (4-6 weeks)  1: improve FOTO 10-15 pts- met  2: Pt able to perform overhead activities without limitation- not met  3: reduce frequency/intensity of cervicogenic headaches by 25%- met  4: RC/periscapular strength 4 to 4+/5- met  5: Pt able to perform dyanmic exercises with efficient core activation- met  6: thoracic rotation WNL- met  7: improve neural mobility by 50%- met      Plan: Continue per plan of care   Continue for another 6 weeks to further his functional strength and improve his neuromuscular control     Precautions: DOS 1/14/21  HEP: cervical rotation, ext, flexion stretch; wand overhead AA flex, doorway stretch, diaphragmatic breathing, upper trunk rotation, PNG's median bias    Manuals 3/11 3/16 3/18          Gentle cervical PROM supine and added ext off table 10 10 10          SCM STM 5 5 5          Shoulder PROM/AA /pec stretch PNGs 10 10 10           25 25 25          Neuro Re-Ed                                                                                                        Ther Ex             TB row Red 2x10 Yellow 2x10 Yellow 2x10           TB robberies Red 2x10 NP           TB lawnmower Red 2x10 Yellow 2x10 Yellow 2x10          TB extension Red 2x10 Yellow 20x Yellow 2x10          TB PNF D2 Red 2x10 Yellow 20x Yellow 2x10          Standing scaption 2# 20x            Standing scaption held with LE marches 15x            Supine LE marches with chest press  2# 2x10 2# 2x10          Standing ER- foam roll against wall  2# 2x10 2# 2x10          Ball roll on plinth/lat stretch  15x each 15x each           25 20 uncharged 10/10          Ther Activity                                       Gait Training                                       Modalities             MH 10 10 10       10 min   CP

## 2021-03-23 ENCOUNTER — OFFICE VISIT (OUTPATIENT)
Dept: PHYSICAL THERAPY | Facility: MEDICAL CENTER | Age: 51
End: 2021-03-23
Payer: COMMERCIAL

## 2021-03-23 DIAGNOSIS — G54.0 THORACIC OUTLET SYNDROME OF RIGHT THORACIC OUTLET: Primary | ICD-10-CM

## 2021-03-23 PROCEDURE — 97140 MANUAL THERAPY 1/> REGIONS: CPT

## 2021-03-23 PROCEDURE — 97010 HOT OR COLD PACKS THERAPY: CPT

## 2021-03-23 PROCEDURE — 97112 NEUROMUSCULAR REEDUCATION: CPT

## 2021-03-23 NOTE — PROGRESS NOTES
Daily Note     Today's date: 3/23/2021  Patient name: Debra Hutchinson  : 1970  MRN: [id number]  Referring provider: Giuseppe Bo DO  Dx:   Encounter Diagnosis     ICD-10-CM    1  Thoracic outlet syndrome of right thoracic outlet  G54 0                   Subjective: Pt reports Dr was happy with his progress, wants him to continue therapy for another 8 weeks will follow up in 3 months  However since last visit Pt has noticed increased headaches and return of tingling to side of his face  Had trouble concentrating at work  Confident symptoms will subside again  Objective: See treatment diary below      Assessment: Tolerated treatment well  Patient exhibited good technique with therapeutic exercises and would benefit from continued PT  No increased tension to R shoulder or cervical musculature  Performed manual traction and cervical retraction, pt tolerated well  Only had Pt performed gentle stretching  Pt noted he felt fatigued even after the stretching exercises  Plan: Continue per plan of care        Precautions: DOS 21  HEP: cervical rotation, ext, flexion stretch; wand overhead AA flex, doorway stretch, diaphragmatic breathing, upper trunk rotation, PNG's median bias    Manuals 3/11 3/16 3/18 3/23         Gentle cervical PROM supine and added ext off table 10 10 10 10         SCM STM 5 5 5 5         Shoulder PROM/AA /pec stretch PNGs 10 10 10 10          25 25 25 25         Neuro Re-Ed                                                                                                        Ther Ex             TB row Red 2x10 Yellow 2x10 Yellow 2x10           TB robberies Red 2x10 NP           TB lawnmower Red 2x10 Yellow 2x10 Yellow 2x10          TB extension Red 2x10 Yellow 20x Yellow 2x10          TB PNF D2 Red 2x10 Yellow 20x Yellow 2x10          Standing scaption 2# 20x            Standing scaption held with LE marches 15x            Supine LE marches with chest press  2# 2x10 2# 2x10 Standing ER- foam roll against wall  2# 2x10 2# 2x10          Ball roll on plinth/lat stretch  15x each 15x each 15x each          25 20 uncharged 10/10 10         Ther Activity                                       Gait Training                                       Modalities              10 10 10 10      10 min   CP

## 2021-03-24 ENCOUNTER — TELEPHONE (OUTPATIENT)
Dept: FAMILY MEDICINE CLINIC | Facility: CLINIC | Age: 51
End: 2021-03-24

## 2021-03-24 NOTE — TELEPHONE ENCOUNTER
Please call patient about his results from is MRI he can see the first page but does not see the second and would like to know what the results are  Call on [phone]

## 2021-03-25 ENCOUNTER — OFFICE VISIT (OUTPATIENT)
Dept: PHYSICAL THERAPY | Facility: MEDICAL CENTER | Age: 51
End: 2021-03-25
Payer: COMMERCIAL

## 2021-03-25 DIAGNOSIS — G54.0 THORACIC OUTLET SYNDROME OF RIGHT THORACIC OUTLET: Primary | ICD-10-CM

## 2021-03-25 DIAGNOSIS — Z23 ENCOUNTER FOR IMMUNIZATION: ICD-10-CM

## 2021-03-25 PROCEDURE — 97110 THERAPEUTIC EXERCISES: CPT

## 2021-03-25 PROCEDURE — 97010 HOT OR COLD PACKS THERAPY: CPT

## 2021-03-25 PROCEDURE — 97112 NEUROMUSCULAR REEDUCATION: CPT

## 2021-03-25 PROCEDURE — 97140 MANUAL THERAPY 1/> REGIONS: CPT

## 2021-03-25 NOTE — PROGRESS NOTES
Daily Note     Today's date: 3/25/2021  Patient name: Lucrecia Seaman  : 1970  MRN: [id number]  Referring provider: Jefferson Jonas DO  Dx:   Encounter Diagnosis     ICD-10-CM    1  Thoracic outlet syndrome of right thoracic outlet  G54 0                   Subjective: Pt reports he has been feeling better  Less intensity for his headaches      Objective: See treatment diary below      Assessment: Tolerated treatment well  Patient exhibited good technique with therapeutic exercises and would benefit from continued PT  Mild stiffness in his SCM and pectoralis musculature  Still has impressive glide and mobility of his peripheral nerves  Reintroduced TB exercises today, pt tolerated well  Pt reported he had some tightness in his scapular musculature  Plan: Continue per plan of care        Precautions: DOS 21  HEP: cervical rotation, ext, flexion stretch; wand overhead AA flex, doorway stretch, diaphragmatic breathing, upper trunk rotation, PNG's median bias    Manuals 3/11 3/16 3/18 3/23 3/25        Gentle cervical PROM supine and added ext off table 10 10 10 10 10        SCM STM 5 5 5 5 5        Shoulder PROM/AA /pec stretch PNGs 10 10 10 10 5         25 25 25 25 20        Neuro Re-Ed                                                                                                        Ther Ex             TB row Red 2x10 Yellow 2x10 Yellow 2x10  yellow 2x10         TB robberies Red 2x10 NP   yellow 2x10        TB lawnmower Red 2x10 Yellow 2x10 Yellow 2x10  yellow 20x        TB extension Red 2x10 Yellow 20x Yellow 2x10  yellow 20x        TB PNF D2 Red 2x10 Yellow 20x Yellow 2x10  yellow 20x        Standing scaption 2# 20x            Standing scaption held with LE marches 15x            Supine LE marches with chest press  2# 2x10 2# 2x10  NP        Standing ER- foam roll against wall  2# 2x10 2# 2x10  0# 20x        Ball roll on plinth/lat stretch  15x each 15x each 15x each 15x each         25 20 uncharged 10/10 10 15        Ther Activity                                       Gait Training                                       Modalities              10 10 10 10 10     10 min   CP

## 2021-04-01 ENCOUNTER — OFFICE VISIT (OUTPATIENT)
Dept: PHYSICAL THERAPY | Facility: MEDICAL CENTER | Age: 51
End: 2021-04-01
Payer: COMMERCIAL

## 2021-04-01 DIAGNOSIS — G54.0 THORACIC OUTLET SYNDROME OF RIGHT THORACIC OUTLET: Primary | ICD-10-CM

## 2021-04-01 PROCEDURE — 97140 MANUAL THERAPY 1/> REGIONS: CPT

## 2021-04-01 PROCEDURE — 97112 NEUROMUSCULAR REEDUCATION: CPT

## 2021-04-01 PROCEDURE — 97110 THERAPEUTIC EXERCISES: CPT

## 2021-04-01 PROCEDURE — 97010 HOT OR COLD PACKS THERAPY: CPT

## 2021-04-01 NOTE — PROGRESS NOTES
Daily Note     Today's date: 2021  Patient name: Sunshine Gilmore  : 1970  MRN: [id number]  Referring provider: Ara Downing DO  Dx:   Encounter Diagnosis     ICD-10-CM    1  Thoracic outlet syndrome of right thoracic outlet  G54 0                   Subjective: Pt feels that he needs to continue to stretch to make sure his headaches aren't too bad  Still feels like the pain travels down to posterior neck to lattismus      Objective: See treatment diary below      Assessment: Tolerated treatment well  Patient exhibited good technique with therapeutic exercises and would benefit from continued PT   Attempted active cervical retraction, contraction of deep extensors increased symptoms  Pt may be getting some compression to posterior triangle, we will continue on his stretching program but also instilling shoulder and periscapular strengthening without causing contraction of his cervical musculature  Added prone exercises for scapular strengthening to make sure Pt keeps cervical muscles relaxed while strengthening his posterior musculature  Plan: Continue per plan of care        Precautions: DOS 21  HEP: cervical rotation, ext, flexion stretch; wand overhead AA flex, doorway stretch, diaphragmatic breathing, upper trunk rotation, PNG's median bias, prone scap retraction, ext, abd    Manuals 3/11 3/16 3/18 3/23 3/25 4/1       Gentle cervical PROM supine and added ext off table 10 10 10 10 10 10       SCM STM 5 5 5 5 5 5       Shoulder PROM/AA /pec stretch PNGs 10 10 10 10 5 5        25 25 25 25 20 20       Neuro Re-Ed                                                                                                        Ther Ex             TB row Red 2x10 Yellow 2x10 Yellow 2x10  yellow 2x10 yelow 20x        TB robberies Red 2x10 NP   yellow 2x10  yellow 20x       TB lawnmower Red 2x10 Yellow 2x10 Yellow 2x10  yellow 20x  yellow 20x       TB extension Red 2x10 Yellow 20x Yellow 2x10  yellow 20x yelow 20x       TB PNF D2 Red 2x10 Yellow 20x Yellow 2x10  yellow 20x yellow 20x       Standing scaption 2# 20x            Standing scaption held with RG pearson 15x            Supine RG pearson with chest press  2# 2x10 2# 2x10  NP        Standing ER- foam roll against wall  2# 2x10 2# 2x10  0# 20x 0# 2x10       Ball roll on plinth/lat stretch  15x each 15x each 15x each 15x each 15x        25 20 uncharged 10/10 10 15 15       Ther Activity                                       Gait Training                                       Modalities             MH 10 10 10 10 10 10    10 min   CP

## 2021-04-02 DIAGNOSIS — M54.12 CERVICAL RADICULOPATHY: ICD-10-CM

## 2021-04-02 DIAGNOSIS — R51.9 CHRONIC DAILY HEADACHE: ICD-10-CM

## 2021-04-02 RX ORDER — PREGABALIN 50 MG/1
50 CAPSULE ORAL 2 TIMES DAILY
Qty: 60 CAPSULE | Refills: 0 | Status: SHIPPED | OUTPATIENT
Start: 2021-04-02 | End: 2021-06-26 | Stop reason: SDUPTHER

## 2021-04-06 ENCOUNTER — OFFICE VISIT (OUTPATIENT)
Dept: PHYSICAL THERAPY | Facility: MEDICAL CENTER | Age: 51
End: 2021-04-06
Payer: COMMERCIAL

## 2021-04-06 DIAGNOSIS — G54.0 THORACIC OUTLET SYNDROME OF RIGHT THORACIC OUTLET: Primary | ICD-10-CM

## 2021-04-06 PROCEDURE — 97112 NEUROMUSCULAR REEDUCATION: CPT

## 2021-04-06 PROCEDURE — 97140 MANUAL THERAPY 1/> REGIONS: CPT

## 2021-04-06 PROCEDURE — 97010 HOT OR COLD PACKS THERAPY: CPT

## 2021-04-06 PROCEDURE — 97110 THERAPEUTIC EXERCISES: CPT

## 2021-04-06 NOTE — PROGRESS NOTES
Daily Note     Today's date: 2021  Patient name: Alvarado Garcia  : 1970  MRN: [id number]  Referring provider: Macario Riggs DO  Dx:   Encounter Diagnosis     ICD-10-CM    1  Thoracic outlet syndrome of right thoracic outlet  G54 0                   Subjective: Pt reports he liked the added exercises, they were difficult but they are helping  Objective: See treatment diary below      Assessment: Tolerated treatment well  Patient exhibited good technique with therapeutic exercises and would benefit from continued PT  Reviewed home program- provided tactile cueing to prevent UT compensation  Increased resistance to TB and standing exercises  Pt reported it was the first time where he was able to perform the exercises without triggering his headache pain, Pt very pleased   Plan: Continue per plan of care        Precautions: DOS 21  HEP: cervical rotation, ext, flexion stretch; wand overhead AA flex, doorway stretch, diaphragmatic breathing, upper trunk rotation, PNG's median bias, prone scap retraction, ext, abd    Manuals 3/11 3/16 3/18 3/23 3/25 4/1 4/      Gentle cervical PROM supine and added ext off table 10 10 10 10 10 10 5      SCM STM 5 5 5 5 5 5 5      Shoulder PROM/AA /pec stretch PNGs 10 10 10 10 5 5 5       25 25 25 25 20 20 15      Neuro Re-Ed                                                                                                        Ther Ex             TB row Red 2x10 Yellow 2x10 Yellow 2x10  yellow 2x10 yelow 20x RTB 2x10       TB robberies Red 2x10 NP   yellow 2x10  yellow 20x RTB 2x10      TB lawnmower Red 2x10 Yellow 2x10 Yellow 2x10  yellow 20x  yellow 20x RTB 2x10      TB extension Red 2x10 Yellow 20x Yellow 2x10  yellow 20x  yelow 20x RTB 2x10      TB PNF D2 Red 2x10 Yellow 20x Yellow 2x10  yellow 20x yellow 20x Red 2x10      Standing scaption 2# 20x            Standing scaption held with LE marches 15x            Supine LE marches with chest press  2# 2x10 2# 2x10  NP        Standing ER- foam roll against wall  2# 2x10 2# 2x10  0# 20x 0# 2x10 2# 2x10      Ball roll on plinth/lat stretch  15x each 15x each 15x each 15x each 15x 15x       25 20 uncharged 10/10 10 15 15 10/10      Ther Activity                                       Gait Training                                       Modalities              10 10 10 10 10 10 10      CP

## 2021-04-08 ENCOUNTER — OFFICE VISIT (OUTPATIENT)
Dept: PHYSICAL THERAPY | Facility: MEDICAL CENTER | Age: 51
End: 2021-04-08
Payer: COMMERCIAL

## 2021-04-08 DIAGNOSIS — G54.0 THORACIC OUTLET SYNDROME OF RIGHT THORACIC OUTLET: Primary | ICD-10-CM

## 2021-04-08 PROCEDURE — 97110 THERAPEUTIC EXERCISES: CPT

## 2021-04-08 PROCEDURE — 97140 MANUAL THERAPY 1/> REGIONS: CPT

## 2021-04-08 PROCEDURE — 97010 HOT OR COLD PACKS THERAPY: CPT

## 2021-04-08 PROCEDURE — 97112 NEUROMUSCULAR REEDUCATION: CPT

## 2021-04-08 NOTE — PROGRESS NOTES
Daily Note     Today's date: 2021  Patient name: Shahida Gallo  : 1970  MRN: [id number]  Referring provider: Venkatesh Coles DO  Dx:   Encounter Diagnosis     ICD-10-CM    1  Thoracic outlet syndrome of right thoracic outlet  G54 0                   Subjective: Pt reports his headaches have returned some, could have been from stress from his friend's recent passing      Objective: See treatment diary below      Assessment: Tolerated treatment well  Patient exhibited good technique with therapeutic exercises and would benefit from continued PT  Mild increase in pectoralis tightness  Pt able to complete exercises without triggering symptoms  Reviewed stretching ex's with Pt to continue at home along with prone scapular strengthening, pt undersood  Plan: Continue per plan of care        Precautions: DOS 21  HEP: cervical rotation, ext, flexion stretch; wand overhead AA flex, doorway stretch, diaphragmatic breathing, upper trunk rotation, PNG's median bias, prone scap retraction, ext, abd    Manuals 3/11 3/16 3/18 3/23 3/25 4/1 4     Gentle cervical PROM supine and added ext off table 10 10 10 10 10 10 5 5     SCM STM 5 5 5 5 5 5 5 5     Shoulder PROM/AA /pec stretch PNGs 10 10 10 10 5 5 5 5      25 25 25 25 20 20 15 15     Neuro Re-Ed                                                                                                        Ther Ex             TB row Red 2x10 Yellow 2x10 Yellow 2x10  yellow 2x10 yelow 20x RTB 2x10 RTB 2x10      TB robberies Red 2x10 NP   yellow 2x10  yellow 20x RTB 2x10 RTB 2x10     TB lawnmower Red 2x10 Yellow 2x10 Yellow 2x10  yellow 20x  yellow 20x RTB 2x10 RTB 2x10     TB extension Red 2x10 Yellow 20x Yellow 2x10  yellow 20x  yelow 20x RTB 2x10 RTB 2x10      TB PNF D2 Red 2x10 Yellow 20x Yellow 2x10  yellow 20x yellow 20x Red 2x10 RTB 2x10     Standing scaption 2# 20x            Standing scaption held with  15x            Supine  with chest press  2# 2x10 2# 2x10  NP        Standing ER- foam roll against wall  2# 2x10 2# 2x10  0# 20x 0# 2x10 2# 2x10      Ball roll on plinth/lat stretch  15x each 15x each 15x each 15x each 15x 15x 15x      25 20 uncharged 10/10 10 15 15 10/10 15     Ther Activity                                       Gait Training                                       Modalities             MH 10 10 10 10 10 10 10 10     CP

## 2021-04-13 ENCOUNTER — OFFICE VISIT (OUTPATIENT)
Dept: PHYSICAL THERAPY | Facility: MEDICAL CENTER | Age: 51
End: 2021-04-13
Payer: COMMERCIAL

## 2021-04-13 DIAGNOSIS — G54.0 THORACIC OUTLET SYNDROME OF RIGHT THORACIC OUTLET: Primary | ICD-10-CM

## 2021-04-13 PROCEDURE — 97140 MANUAL THERAPY 1/> REGIONS: CPT

## 2021-04-13 PROCEDURE — 97010 HOT OR COLD PACKS THERAPY: CPT

## 2021-04-13 PROCEDURE — 97110 THERAPEUTIC EXERCISES: CPT

## 2021-04-13 PROCEDURE — 97112 NEUROMUSCULAR REEDUCATION: CPT

## 2021-04-13 NOTE — PROGRESS NOTES
Daily Note     Today's date: 2021  Patient name: Messi Payment  : 1970  MRN: [id number]  Referring provider: Palak Kaur DO  Dx:   Encounter Diagnosis     ICD-10-CM    1  Thoracic outlet syndrome of right thoracic outlet  G54 0                   Subjective: Pt reports he worked himself too much over weekend  Noticed his headaches returned some      Objective: See treatment diary below      Assessment: Tolerated treatment well  Patient exhibited good technique with therapeutic exercises and would benefit from continued PT  No added pectoralis or cervical musculature tightness, mild to latissimus  Pt reported feeling good throughout program  No reported headache after session  Plan: Continue per plan of care        Precautions: DOS 21  HEP: cervical rotation, ext, flexion stretch; wand overhead AA flex, doorway stretch, diaphragmatic breathing, upper trunk rotation, PNG's median bias, prone scap retraction, ext, abd    Manuals 3/11 3/16 3/18 3/23 3/25 4/1 4/6 4/8 4/13    Gentle cervical PROM supine and added ext off table 10 10 10 10 10 10 5 5 5    SCM STM 5 5 5 5 5 5 5 5 5    Shoulder PROM/AA /pec stretch PNGs 10 10 10 10 5 5 5 5 5     25 25 25 25 20 20 15 15 15    Neuro Re-Ed                                                                                                        Ther Ex             TB row Red 2x10 Yellow 2x10 Yellow 2x10  yellow 2x10 yelow 20x RTB 2x10 RTB 2x10 RTB 2x10     TB robberies Red 2x10 NP   yellow 2x10  yellow 20x RTB 2x10 RTB 2x10 RTB 2x10    TB lawnmower Red 2x10 Yellow 2x10 Yellow 2x10  yellow 20x  yellow 20x RTB 2x10 RTB 2x10 RTB 2x10    TB extension Red 2x10 Yellow 20x Yellow 2x10  yellow 20x  yelow 20x RTB 2x10 RTB 2x10  RTB 2x10    TB PNF D2 Red 2x10 Yellow 20x Yellow 2x10  yellow 20x yellow 20x Red 2x10 RTB 2x10 RTB 2x10    Standing scaption 2# 20x            Standing scaption held with RG pearson 15x        pron scap retraciton/sh ext 20x5" each Supine LE marches with chest press  2# 2x10 2# 2x10  NP        Standing ER- foam roll against wall  2# 2x10 2# 2x10  0# 20x 0# 2x10 2# 2x10  2# 2x10    Ball roll on plinth/lat stretch  15x each 15x each 15x each 15x each 15x 15x 15x 15x     25 20 uncharged 10/10 10 15 15 10/10 15 15    Ther Activity                                       Gait Training                                       Modalities             MH 10 10 10 10 10 10 10 10 10    CP

## 2021-04-15 ENCOUNTER — APPOINTMENT (OUTPATIENT)
Dept: PHYSICAL THERAPY | Facility: MEDICAL CENTER | Age: 51
End: 2021-04-15
Payer: COMMERCIAL

## 2021-04-20 ENCOUNTER — OFFICE VISIT (OUTPATIENT)
Dept: PHYSICAL THERAPY | Facility: MEDICAL CENTER | Age: 51
End: 2021-04-20
Payer: COMMERCIAL

## 2021-04-20 DIAGNOSIS — G54.0 THORACIC OUTLET SYNDROME OF RIGHT THORACIC OUTLET: Primary | ICD-10-CM

## 2021-04-20 PROCEDURE — 97010 HOT OR COLD PACKS THERAPY: CPT

## 2021-04-20 PROCEDURE — 97140 MANUAL THERAPY 1/> REGIONS: CPT

## 2021-04-20 PROCEDURE — 97112 NEUROMUSCULAR REEDUCATION: CPT

## 2021-04-20 PROCEDURE — 97110 THERAPEUTIC EXERCISES: CPT

## 2021-04-20 NOTE — PROGRESS NOTES
Daily Note     Today's date: 2021  Patient name: Sunshine Gilmore  : 1970  MRN: [id number]  Referring provider: Ara Downing DO  Dx:   Encounter Diagnosis     ICD-10-CM    1  Thoracic outlet syndrome of right thoracic outlet  G54 0                   Subjective: Pt reports he tried to go on bike last week, set off headaches again  Doesn't know why bc he was sitting up the whole time      Objective: See treatment diary below      Assessment: Tolerated treatment well  Patient exhibited good technique with therapeutic exercises and would benefit from continued PT  No soft tissue restriction, pt tolerated session well  Was headache free when he was leaving  Plan: Continue per plan of care        Precautions: DOS 21  HEP: cervical rotation, ext, flexion stretch; wand overhead AA flex, doorway stretch, diaphragmatic breathing, upper trunk rotation, PNG's median bias, prone scap retraction, ext, abd    Manuals 3/11 3/16 3/18 3/23 3/25 4/1 4/6 4/8 4/13 4/20   Gentle cervical PROM supine and added ext off table 10 10 10 10 10 10 5 5 5 5   SCM STM 5 5 5 5 5 5 5 5 5 5   Shoulder PROM/AA /pec stretch PNGs 10 10 10 10 5 5 5 5 5 10    25 25 25 25 20 20 15 15 15 20   Neuro Re-Ed                                                                                                        Ther Ex             TB row Red 2x10 Yellow 2x10 Yellow 2x10  yellow 2x10 yelow 20x RTB 2x10 RTB 2x10 RTB 2x10 RTB 3x10    TB robberies Red 2x10 NP   yellow 2x10  yellow 20x RTB 2x10 RTB 2x10 RTB 2x10 RTB 3x10   TB lawnmower Red 2x10 Yellow 2x10 Yellow 2x10  yellow 20x  yellow 20x RTB 2x10 RTB 2x10 RTB 2x10 RTB 3x10   TB extension Red 2x10 Yellow 20x Yellow 2x10  yellow 20x  yelow 20x RTB 2x10 RTB 2x10  RTB 2x10 RTB 3x10   TB PNF D2 Red 2x10 Yellow 20x Yellow 2x10  yellow 20x yellow 20x Red 2x10 RTB 2x10 RTB 2x10 RTB 3x10   Standing scaption 2# 20x            Standing scaption held with RG pearson 15x        pron scap retraciton/sh ext 20x5" each 20x5" each   Supine LE marches with chest press  2# 2x10 2# 2x10  NP        Standing ER- foam roll against wall  2# 2x10 2# 2x10  0# 20x 0# 2x10 2# 2x10  2# 2x10 2# 2x10   Ball roll on plinth/lat stretch  15x each 15x each 15x each 15x each 15x 15x 15x 15x 15x    25 20 uncharged 10/10 10 15 15 10/10 15 15 20   Ther Activity                                       Gait Training                                       Modalities             MH 10 10 10 10 10 10 10 10 10 10   CP

## 2021-04-22 ENCOUNTER — OFFICE VISIT (OUTPATIENT)
Dept: PHYSICAL THERAPY | Facility: MEDICAL CENTER | Age: 51
End: 2021-04-22
Payer: COMMERCIAL

## 2021-04-22 DIAGNOSIS — G54.0 THORACIC OUTLET SYNDROME OF RIGHT THORACIC OUTLET: Primary | ICD-10-CM

## 2021-04-22 PROCEDURE — 97112 NEUROMUSCULAR REEDUCATION: CPT

## 2021-04-22 PROCEDURE — 97110 THERAPEUTIC EXERCISES: CPT

## 2021-04-22 PROCEDURE — 97010 HOT OR COLD PACKS THERAPY: CPT

## 2021-04-22 PROCEDURE — 97140 MANUAL THERAPY 1/> REGIONS: CPT

## 2021-04-22 NOTE — PROGRESS NOTES
Daily Note     Today's date: 2021  Patient name: Luis Doss  : 1970  MRN: [id number]  Referring provider: Remi Baldwin DO  Dx:   Encounter Diagnosis     ICD-10-CM    1  Thoracic outlet syndrome of right thoracic outlet  G54 0                   Subjective: Pt wants to get back to exercising but he is unsure how he can return  Wants to get back to push ups      Objective: See treatment diary below      Assessment: Tolerated treatment well  Patient exhibited good technique with therapeutic exercises and would benefit from continued PT  Added prone push ups ( hips relaxed) and wall slides for scapular protraction strengthening and tactile cueing to not use cervical musculature, pt tolerated both well  Plan: Continue per plan of care        Precautions: DOS 21  HEP: cervical rotation, ext, flexion stretch; wand overhead AA flex, doorway stretch, diaphragmatic breathing, upper trunk rotation, PNG's median bias, prone scap retraction, ext, abd    Manuals    Gentle cervical PROM supine and added ext off table 10         5   SCM STM          5   Shoulder PROM/AA /pec stretch PNGs 10         10    20         20   Neuro Re-Ed                                                                                                        Ther Ex             TB row RTB 3x10         RTB 3x10    TB robberies RTB 3x10         RTB 3x10   TB lawnmower RTB 3x10         RTB 3x10   TB extension RTB 3x10         RTB 3x10   TB PNF D2 RTB 3x10         RTB 3x10   Prone push ups 15x            Wall slides BL- lean 15x         20x5" each                Standing ER- foam roll against wall 3# 3x10         2# 2x10   Ball roll on plinth/lat stretch 15x each         15x    20/10         20   Ther Activity                                       Gait Training                                       Modalities             MH 10min         10   CP

## 2021-04-27 ENCOUNTER — OFFICE VISIT (OUTPATIENT)
Dept: PHYSICAL THERAPY | Facility: MEDICAL CENTER | Age: 51
End: 2021-04-27
Payer: COMMERCIAL

## 2021-04-27 DIAGNOSIS — G54.0 THORACIC OUTLET SYNDROME OF RIGHT THORACIC OUTLET: Primary | ICD-10-CM

## 2021-04-27 PROCEDURE — 97010 HOT OR COLD PACKS THERAPY: CPT

## 2021-04-27 PROCEDURE — 97110 THERAPEUTIC EXERCISES: CPT

## 2021-04-27 PROCEDURE — 97140 MANUAL THERAPY 1/> REGIONS: CPT

## 2021-04-27 PROCEDURE — 97112 NEUROMUSCULAR REEDUCATION: CPT

## 2021-04-27 NOTE — PROGRESS NOTES
Daily Note     Today's date: 2021  Patient name: Stephanie Dias  : 1970  MRN: [id number]  Referring provider: Xander Whittaker DO  Dx:   Encounter Diagnosis     ICD-10-CM    1  Thoracic outlet syndrome of right thoracic outlet  G54 0                   Subjective: Pt reports he did too much yard work over weekend, was able to get muscles to relax enough to get pain to only the headaches      Objective: See treatment diary below      Assessment: Tolerated treatment well  Patient exhibited good technique with therapeutic exercises and would benefit from continued PT  Pt had increased tension t his UT's and middle scalenes on R  No peripheral neural tension noted, Increased tension to posterior musculature to R shoulder  No complaints during TE program, pt reported feeling good after session  Plan: Continue per plan of care        Precautions: DOS 21  HEP: cervical rotation, ext, flexion stretch; wand overhead AA flex, doorway stretch, diaphragmatic breathing, upper trunk rotation, PNG's median bias, prone scap retraction, ext, abd    Manuals    Gentle cervical PROM supine and added ext off table 10 10        5   SCM STM          5   Shoulder PROM/AA /pec stretch PNGs 10 5        10    20 15        20   Neuro Re-Ed                                                                                                        Ther Ex             TB row RTB 3x10 RTB 3x10        RTB 3x10    TB robberies RTB 3x10 RTB 3x10        RTB 3x10   TB lawnmower RTB 3x10 RTB 3x10        RTB 3x10   TB extension RTB 3x10 RTB 3x10        RTB 3x10   TB PNF D2 RTB 3x10 RTB 3x10        RTB 3x10   Prone push ups 15x 15x           Wall slides BL- lean 15x 15x        20x5" each                Standing ER- foam roll against wall 3# 3x10 3# 3x10        2# 2x10   Ball roll on plinth/lat stretch 15x each 15x each        15x    20/10 15/10        20   Ther Activity                                       Gait Training                                       Modalities              10min 10 min        10   CP

## 2021-04-29 ENCOUNTER — OFFICE VISIT (OUTPATIENT)
Dept: PHYSICAL THERAPY | Facility: MEDICAL CENTER | Age: 51
End: 2021-04-29
Payer: COMMERCIAL

## 2021-04-29 DIAGNOSIS — G54.0 THORACIC OUTLET SYNDROME OF RIGHT THORACIC OUTLET: Primary | ICD-10-CM

## 2021-04-29 PROCEDURE — 97140 MANUAL THERAPY 1/> REGIONS: CPT

## 2021-04-29 PROCEDURE — 97110 THERAPEUTIC EXERCISES: CPT

## 2021-04-29 PROCEDURE — 97530 THERAPEUTIC ACTIVITIES: CPT

## 2021-04-29 NOTE — PROGRESS NOTES
Daily Note     Today's date: 2021  Patient name: Flor Rooney  : 1970  MRN: [id number]  Referring provider: Zeina Vogel DO  Dx:   Encounter Diagnosis     ICD-10-CM    1  Thoracic outlet syndrome of right thoracic outlet  G54 0                   Subjective: Pt reports he still is feeling good      Objective: See treatment diary below      Assessment: Tolerated treatment well  Patient exhibited good technique with therapeutic exercises and would benefit from continued PT  D/C MH- added UBE- Pt was able to tolerate without triggering headaches  Increased resistance to TB- pt able to tolerate well, no significant fatigue    Plan: Continue per plan of care        Precautions: DOS 21  HEP: cervical rotation, ext, flexion stretch; wand overhead AA flex, doorway stretch, diaphragmatic breathing, upper trunk rotation, PNG's median bias, prone scap retraction, ext, abd    Manuals    Gentle cervical PROM supine and added ext off table 10 10 10       5   SCM STM          5   Shoulder PROM/AA /pec stretch PNGs 10 5 5       10    20 15 15       20   Neuro Re-Ed                                                                                                        Ther Ex             TB row RTB 3x10 RTB 3x10 GTB 3x10       RTB 3x10    TB robberies RTB 3x10 RTB 3x10 GTB 3x10       RTB 3x10   TB lawnmower RTB 3x10 RTB 3x10 GTB 3x10       RTB 3x10   TB extension RTB 3x10 RTB 3x10 GTB 3x10       RTB 3x10   TB PNF D2 RTB 3x10 RTB 3x10 GTB 3x10       RTB 3x10   Prone push ups 15x 15x 15x          Wall slides BL- lean 15x 15x 15x       20x5" each                Standing ER- foam roll against wall 3# 3x10 3# 3x10 3# 3x10       2# 2x10   Ball roll on plinth/lat stretch 15x each 15x each 15x each       15x    20/10 15/10 15/10       20   Ther Activity             UBE warmup   3/3min                       Gait Training                                       Modalities              10min 10 min 10   CP

## 2021-05-04 ENCOUNTER — OFFICE VISIT (OUTPATIENT)
Dept: PHYSICAL THERAPY | Facility: MEDICAL CENTER | Age: 51
End: 2021-05-04
Payer: COMMERCIAL

## 2021-05-04 DIAGNOSIS — G54.0 THORACIC OUTLET SYNDROME OF RIGHT THORACIC OUTLET: Primary | ICD-10-CM

## 2021-05-04 PROCEDURE — 97140 MANUAL THERAPY 1/> REGIONS: CPT

## 2021-05-04 PROCEDURE — 97110 THERAPEUTIC EXERCISES: CPT

## 2021-05-04 PROCEDURE — 97112 NEUROMUSCULAR REEDUCATION: CPT

## 2021-05-04 NOTE — PROGRESS NOTES
Daily Note/Re-evaluation     Today's date: 2021  Patient name: Indira Romo  : 1970  MRN: [id number]  Referring provider: Jesus Benavides DO  Dx:   Encounter Diagnosis     ICD-10-CM    1  Thoracic outlet syndrome of right thoracic outlet  G54 0                   Subjective: Pt reports he did some yardwork over the weekend just feels tighter in his neck and shoulder  Pt feels he is about 70% of functioning, fearful of activity increasing his headaches      Objective: See treatment diary below      Assessment: Tolerated treatment well  Patient exhibited good technique with therapeutic exercises and would benefit from continued PT  Cervical AROM- WNL  - mild increase in L UT tightness   -Pt also had some compensation in L UT when shoulder strengthening exercises  inferior capsule/latissimus tightness in R shoulder limiting abd/flex to about 125 degrees  Full ER and IR  ULTT- median bias; elbow extension to 30 degrees  MMT  Sh flex 4+  Sh scaption 4+  Sh abd 4+  Sh horiz abd 4/5  Goals  STG (2-3 weeks)  1: pt independent in HEP- met  2: improve cervical mobility by 25%- met  3: improve neural mobility by 25%- met  4: full passive ROM shoulder WNL- met    LTG (4-6 weeks)  1: improve FOTO 10-15 pts- met  2: Pt able to perform overhead activities without limitation- partially met  3: reduce frequency/intensity of cervicogenic headaches by 25%- met  4: RC/periscapular strength 4 to 4+/5- met  5: Pt able to perform dyanmic exercises with efficient core activation- met  6: thoracic rotation WNL- met  7: improve neural mobility by 50%- met    Pt has improved in reducing his compensation of using his UT for elevation of his R shoulder We still are working toward increasing activity level without triggering his cervicogenic headaches  Plan: Continue per plan of care   continue for another 4 weeks     Precautions: DOS 21  HEP: cervical rotation, ext, flexion stretch; wand overhead AA flex, doorway stretch, diaphragmatic breathing, upper trunk rotation, PNG's median bias, prone scap retraction, ext, abd    Manuals 4/22 4/27 4/29 5/4 4/20   Gentle cervical PROM supine and added ext off table 10 10 10 10      5   SCM STM          5   Shoulder PROM/AA /pec stretch PNGs 10 5 5 5      10    20 15 15 15      20   Neuro Re-Ed                                                                                                        Ther Ex             TB row RTB 3x10 RTB 3x10 GTB 3x10 RTB 3x10      RTB 3x10    TB robberies RTB 3x10 RTB 3x10 GTB 3x10 RTB 3x10      RTB 3x10   TB lawnmower RTB 3x10 RTB 3x10 GTB 3x10 RTB 3x10      RTB 3x10   TB extension RTB 3x10 RTB 3x10 GTB 3x10 RTB 3x10      RTB 3x10   TB PNF D2 RTB 3x10 RTB 3x10 GTB 3x10 RTB 3x10      RTB 3x10   Prone push ups 15x 15x 15x 15x         Wall slides BL- lean 15x 15x 15x 15x      20x5" each                Standing ER- foam roll against wall 3# 3x10 3# 3x10 3# 3x10 3# 3x10      2# 2x10   Ball roll on plinth/lat stretch 15x each 15x each 15x each 15x      15x    20/10 15/10 15/10 10/10      20   Ther Activity             UBE warmup   3/3min 3/3min                      Gait Training                                       Modalities             MH 10min 10 min        10   CP

## 2021-05-06 ENCOUNTER — OFFICE VISIT (OUTPATIENT)
Dept: PHYSICAL THERAPY | Facility: MEDICAL CENTER | Age: 51
End: 2021-05-06
Payer: COMMERCIAL

## 2021-05-06 DIAGNOSIS — G54.0 THORACIC OUTLET SYNDROME OF RIGHT THORACIC OUTLET: Primary | ICD-10-CM

## 2021-05-06 PROCEDURE — 97110 THERAPEUTIC EXERCISES: CPT

## 2021-05-06 PROCEDURE — 97112 NEUROMUSCULAR REEDUCATION: CPT

## 2021-05-06 PROCEDURE — 97140 MANUAL THERAPY 1/> REGIONS: CPT

## 2021-05-06 NOTE — PROGRESS NOTES
Daily Note     Today's date: 2021  Patient name: Rosalva Cheng  : 1970  MRN: [id number]  Referring provider: Melvin Gayle DO  Dx:   Encounter Diagnosis     ICD-10-CM    1  Thoracic outlet syndrome of right thoracic outlet  G54 0                   Subjective: Pt reports added stress has increased some tension in his neck      Objective: See treatment diary below      Assessment: Tolerated treatment well  Patient exhibited good technique with therapeutic exercises and would benefit from continued PT  Pt had minimal tension to BL UT's  Added flexbar stability exercise for BL shoulders- Pt had increased pain into his temporal region- relieved after UBE    Plan: Continue per plan of care        Precautions: DOS 21  HEP: cervical rotation, ext, flexion stretch; wand overhead AA flex, doorway stretch, diaphragmatic breathing, upper trunk rotation, PNG's median bias, prone scap retraction, ext, abd    Manuals  5//   Gentle cervical PROM supine and added ext off table 10 10 10 10 10     5   SCM STM          5   Shoulder PROM/AA /pec stretch PNGs 10 5 5 5 10     10    20 15 15 15 20     20   Neuro Re-Ed                                                                                                        Ther Ex             TB row RTB 3x10 RTB 3x10 GTB 3x10 RTB 3x10 GTB 2x10     RTB 3x10    TB robberies RTB 3x10 RTB 3x10 GTB 3x10 RTB 3x10 GTB 2x10     RTB 3x10   TB lawnmower RTB 3x10 RTB 3x10 GTB 3x10 RTB 3x10 GTB 2x10     RTB 3x10   TB extension RTB 3x10 RTB 3x10 GTB 3x10 RTB 3x10 RTB 2x10     RTB 3x10   TB PNF D2 RTB 3x10 RTB 3x10 GTB 3x10 RTB 3x10 GTB 2x10     RTB 3x10   Prone push ups 15x 15x 15x 15x 15x        Wall slides BL- lean 15x 15x 15x 15x 15x     20x5" each                Standing ER- foam roll against wall 3# 3x10 3# 3x10 3# 3x10 3# 3x10 3# 3x10     2# 2x10   Ball roll on plinth/lat stretch 15x each 15x each 15x each 15x 15x     15x   flexbar stability     1 min x2 20/10 15/10 15/10 10/10 15/10     20   Ther Activity             UBE warmup   3/3min 3/3min 3/3min                     Gait Training                                       Modalities             MH 10min 10 min        10   CP

## 2021-05-11 ENCOUNTER — APPOINTMENT (OUTPATIENT)
Dept: PHYSICAL THERAPY | Facility: MEDICAL CENTER | Age: 51
End: 2021-05-11
Payer: COMMERCIAL

## 2021-05-13 ENCOUNTER — OFFICE VISIT (OUTPATIENT)
Dept: PHYSICAL THERAPY | Facility: MEDICAL CENTER | Age: 51
End: 2021-05-13
Payer: COMMERCIAL

## 2021-05-13 DIAGNOSIS — G54.0 THORACIC OUTLET SYNDROME OF RIGHT THORACIC OUTLET: Primary | ICD-10-CM

## 2021-05-13 PROCEDURE — 97010 HOT OR COLD PACKS THERAPY: CPT

## 2021-05-13 PROCEDURE — 97110 THERAPEUTIC EXERCISES: CPT

## 2021-05-13 PROCEDURE — 97140 MANUAL THERAPY 1/> REGIONS: CPT

## 2021-05-13 NOTE — PROGRESS NOTES
Daily Note     Today's date: 2021  Patient name: Abbe Rich  : 1970  MRN: [id number]  Referring provider: Luis Clark DO  Dx:   Encounter Diagnosis     ICD-10-CM    1  Thoracic outlet syndrome of right thoracic outlet  G54 0                   Subjective: Pt reports he's been feeling pretty good  Objective: See treatment diary below      Assessment: Tolerated treatment well  Patient exhibited good technique with therapeutic exercises and would benefit from continued PT   Pt had slight increase in cervical muscular tension on his L side   Increased resistance to TB exercises, Pt noted mild headache at the end of session but it was tolerable      Plan: Continue per plan of care        Precautions: DOS 21  HEP: cervical rotation, ext, flexion stretch; wand overhead AA flex, doorway stretch, diaphragmatic breathing, upper trunk rotation, PNG's median bias, prone scap retraction, ext, abd    Manuals  5/ 5/   Gentle cervical PROM supine and added ext off table 10 10 10 10 10 10    5   SCM STM          5   Shoulder PROM/AA /pec stretch PNGs 10 5 5 5 10 10    10    20 15 15 15 20 20    20   Neuro Re-Ed                                                                                                        Ther Ex             TB row RTB 3x10 RTB 3x10 GTB 3x10 RTB 3x10 GTB 2x10 GTB 2x10    RTB 3x10    TB robberies RTB 3x10 RTB 3x10 GTB 3x10 RTB 3x10 GTB 2x10 GTB 2x10    RTB 3x10   TB lawnmower RTB 3x10 RTB 3x10 GTB 3x10 RTB 3x10 GTB 2x10 GTB 2x10    RTB 3x10   TB extension RTB 3x10 RTB 3x10 GTB 3x10 RTB 3x10 RTB 2x10 GTB 2x10    RTB 3x10   TB PNF D2 RTB 3x10 RTB 3x10 GTB 3x10 RTB 3x10 GTB 2x10 GTB 2x10    RTB 3x10   Prone push ups 15x 15x 15x 15x 15x 15x       Wall slides BL- lean 15x 15x 15x 15x 15x 15x    20x5" each                Standing ER- foam roll against wall 3# 3x10 3# 3x10 3# 3x10 3# 3x10 3# 3x10 3# 3x10    2# 2x10   Ball roll on plinth/lat stretch 15x each 15x each 15x each 15x 15x 15x    15x   flexbar stability     1 min x2 NP        20/10 15/10 15/10 10/10 15/10     20   Ther Activity             UBE warmup   3/3min 3/3min 3/3min 3/3min                    Gait Training                                       Modalities             MH 10min 10 min        10   CP

## 2021-05-18 ENCOUNTER — APPOINTMENT (OUTPATIENT)
Dept: PHYSICAL THERAPY | Facility: MEDICAL CENTER | Age: 51
End: 2021-05-18
Payer: COMMERCIAL

## 2021-05-19 ENCOUNTER — OFFICE VISIT (OUTPATIENT)
Dept: PHYSICAL THERAPY | Facility: MEDICAL CENTER | Age: 51
End: 2021-05-19
Payer: COMMERCIAL

## 2021-05-19 DIAGNOSIS — G54.0 THORACIC OUTLET SYNDROME OF RIGHT THORACIC OUTLET: Primary | ICD-10-CM

## 2021-05-19 PROCEDURE — 97112 NEUROMUSCULAR REEDUCATION: CPT

## 2021-05-19 PROCEDURE — 97140 MANUAL THERAPY 1/> REGIONS: CPT

## 2021-05-19 PROCEDURE — 97110 THERAPEUTIC EXERCISES: CPT

## 2021-05-19 NOTE — PROGRESS NOTES
Daily Note     Today's date: 2021  Patient name: Esau Washington  : 1970  MRN: [id number]  Referring provider: Viv Flor DO  Dx:   Encounter Diagnosis     ICD-10-CM    1  Thoracic outlet syndrome of right thoracic outlet  G54 0                   Subjective: Pt reports his headaches has been back since Friday, hasnt let up since      Objective: See treatment diary below      Assessment: Tolerated treatment well  Patient exhibited good technique with therapeutic exercises and would benefit from continued PT  Pt had moderate tension in his cervical musculature, able to reduce after manual stretching  Kept TB resistance lighter today  Pt reported no pain behind his eye after session  Reminded him to keep up with his cervical stretches to help reduce tension in his neck which we know previously contributes to his headaches    Plan: Continue per plan of care        Precautions: DOS 21  HEP: cervical rotation, ext, flexion stretch; wand overhead AA flex, doorway stretch, diaphragmatic breathing, upper trunk rotation, PNG's median bias, prone scap retraction, ext, abd    Manuals       Gentle cervical PROM supine and added ext off table 10 10 10 10 10 10 10      SCM STM             Shoulder PROM/AA /pec stretch PNGs 10 5 5 5 10 10 10       20 15 15 15 20 20 20      Neuro Re-Ed                                                                                                        Ther Ex             TB row RTB 3x10 RTB 3x10 GTB 3x10 RTB 3x10 GTB 2x10 GTB 2x10 RTB 2x10       TB robberies RTB 3x10 RTB 3x10 GTB 3x10 RTB 3x10 GTB 2x10 GTB 2x10 RTB 2x10      TB lawnmower RTB 3x10 RTB 3x10 GTB 3x10 RTB 3x10 GTB 2x10 GTB 2x10 RTB 2x10      TB extension RTB 3x10 RTB 3x10 GTB 3x10 RTB 3x10 RTB 2x10 GTB 2x10 RTB 2x10      TB PNF D2 RTB 3x10 RTB 3x10 GTB 3x10 RTB 3x10 GTB 2x10 GTB 2x10 RTB 2x10      Prone push ups 15x 15x 15x 15x 15x 15x 15x      Wall slides BL- lean 15x 15x 15x 15x 15x 15x 15x                   Standing ER- foam roll against wall 3# 3x10 3# 3x10 3# 3x10 3# 3x10 3# 3x10 3# 3x10 3# 3x10      Ball roll on plinth/lat stretch 15x each 15x each 15x each 15x 15x 15x 15x      flexbar stability     1 min x2 NP        20/10 15/10 15/10 10/10 15/10  15/10      Ther Activity             UBE warmup   3/3min 3/3min 3/3min 3/3min 3/3min                   Gait Training                                       Modalities             MH 10min 10 min           CP

## 2021-05-20 ENCOUNTER — OFFICE VISIT (OUTPATIENT)
Dept: PHYSICAL THERAPY | Facility: MEDICAL CENTER | Age: 51
End: 2021-05-20
Payer: COMMERCIAL

## 2021-05-20 DIAGNOSIS — G54.0 THORACIC OUTLET SYNDROME OF RIGHT THORACIC OUTLET: Primary | ICD-10-CM

## 2021-05-20 PROCEDURE — 97110 THERAPEUTIC EXERCISES: CPT

## 2021-05-20 PROCEDURE — 97140 MANUAL THERAPY 1/> REGIONS: CPT

## 2021-05-20 PROCEDURE — 97112 NEUROMUSCULAR REEDUCATION: CPT

## 2021-05-25 ENCOUNTER — OFFICE VISIT (OUTPATIENT)
Dept: PHYSICAL THERAPY | Facility: MEDICAL CENTER | Age: 51
End: 2021-05-25
Payer: COMMERCIAL

## 2021-05-25 DIAGNOSIS — G54.0 THORACIC OUTLET SYNDROME OF RIGHT THORACIC OUTLET: Primary | ICD-10-CM

## 2021-05-25 PROCEDURE — 97112 NEUROMUSCULAR REEDUCATION: CPT

## 2021-05-25 PROCEDURE — 97140 MANUAL THERAPY 1/> REGIONS: CPT

## 2021-05-25 PROCEDURE — 97110 THERAPEUTIC EXERCISES: CPT

## 2021-05-25 NOTE — PROGRESS NOTES
Daily Note     Today's date: 2021  Patient name: Aurora Mrak  : 1970  MRN: [id number]  Referring provider: Kervin Alexis DO  Dx:   Encounter Diagnosis     ICD-10-CM    1  Thoracic outlet syndrome of right thoracic outlet  G54 0                   Subjective: Pt reports he has been feeling good  Still some tightness in his R anterior shoulder and L cervical musculature  Objective: See treatment diary below      Assessment: Tolerated treatment well  Patient exhibited good technique with therapeutic exercises and would benefit from continued PT  Pt wanted to try out push-ups, pt was able to perform 10 reps without triggering his symptoms in his neck or lateral head and face  Attempted side plank but Pt was too weak to maintain body weight  Provided pt with TB ( green) for Pt to translate exercises for home and practice pulling bow for archery  Pt happy with his progress; will continue therapy 1x/ week for the next 6 weeks      Plan: Continue per plan of care        Precautions: DOS 21  HEP: cervical rotation, ext, flexion stretch; wand overhead AA flex, doorway stretch, diaphragmatic breathing, upper trunk rotation, PNG's median bias, prone scap retraction, ext, abd    Manuals  5/4 5/6 13     Gentle cervical PROM supine and added ext off table 10 10 10 10 10 10 10 10 10    SCM STM             Shoulder PROM/AA /pec stretch PNGs 10 5 5 5 10 10 10 15 10     20 15 15 15 20 20 20 25 20    Neuro Re-Ed                                                                                                        Ther Ex             TB row RTB 3x10 RTB 3x10 GTB 3x10 RTB 3x10 GTB 2x10 GTB 2x10 RTB 2x10 GTB 2x10 GTB 2x10     TB robberies RTB 3x10 RTB 3x10 GTB 3x10 RTB 3x10 GTB 2x10 GTB 2x10 RTB 2x10 GTB 2x10 GTB 2x10    TB lawnmower RTB 3x10 RTB 3x10 GTB 3x10 RTB 3x10 GTB 2x10 GTB 2x10 RTB 2x10 GTB 2x10 GTB 2x10    TB extension RTB 3x10 RTB 3x10 GTB 3x10 RTB 3x10 RTB 2x10 GTB 2x10 RTB 2x10 GTB 2x10 GTB 2x10    TB PNF D2 RTB 3x10 RTB 3x10 GTB 3x10 RTB 3x10 GTB 2x10 GTB 2x10 RTB 2x10 GTB 2x10 GTB 2x10    Prone push ups 15x 15x 15x 15x 15x 15x 15x 15 15    Wall slides BL- lean 15x 15x 15x 15x 15x 15x 15x 15 15                 Standing ER- foam roll against wall 3# 3x10 3# 3x10 3# 3x10 3# 3x10 3# 3x10 3# 3x10 3# 3x10 4# 2x10 + raises 4# 2x10 + raises    Ball roll on plinth/lat stretch 15x each 15x each 15x each 15x 15x 15x 15x 15x 15x    flexbar stability     1 min x2 NP        20/10 15/10 15/10 10/10 15/10  15/10 15/10 15/10    Ther Activity             UBE warmup   3/3min 3/3min 3/3min 3/3min 3/3min 3/3min 3/3min                 Gait Training                                       Modalities             MH 10min 10 min           CP

## 2021-05-27 ENCOUNTER — APPOINTMENT (OUTPATIENT)
Dept: PHYSICAL THERAPY | Facility: MEDICAL CENTER | Age: 51
End: 2021-05-27
Payer: COMMERCIAL

## 2021-05-27 NOTE — PROGRESS NOTES
Assessment/Plan:   1  New onset headache  Unclear as to the exact cause of patient's symptoms today  There is concerned secondary to the severity of his headache  He describes this as the worst headache he has ever had  He states that this does not feel like other headaches that he has developed in the past   At this time, check CT of head to rule out subarachnoid hemorrhage  He may continue with his muscle relaxer as well as anti-inflammatories  If any symptoms should worsen this significantly, he was advised to go straight to the ED   - CT head wo contrast; Future    2  Sternocleidomastoid muscle tenderness  Patient appears to have a strain in his sternocleidomastoid  This may likely have developed from his therapy  At this time, continue with routine stretching exercises  He was given a prescription for prednisone burst   Follow up if any symptoms are worsening   - predniSONE 20 mg tablet; Take 3 tablets (60 mg total) by mouth daily  Dispense: 15 tablet; Refill: 0     Diagnoses and all orders for this visit:    Tension-type headache, not intractable, unspecified chronicity pattern    New onset headache  -     CT head wo contrast; Future    Sternocleidomastoid muscle tenderness  -     predniSONE 20 mg tablet; Take 3 tablets (60 mg total) by mouth daily    Other orders  -     XARELTO 10 MG tablet; Take 1 tablet by mouth daily  -     Methylprednisolone 4 MG TBPK;           Subjective:    Chief Complaint   Patient presents with    Headache     Patient presents for headaches, Patient also C/O pain and pressure on the right side of his chest          Patient ID: Doe Nguyen is a 50 y o  male  Patient is a 47-year-old male presents today with a CC of significant head pressure  He states that he was at PT working on his left foot  He was on the ground to exercise when he started to developed a severe neck as well as head pressure  He describes this as a worse headache he has ever had    He denies any Dr. Hilliard,  [facility] isn't able to get one of the ingredients (sodium bicarb) to make the compound.   He suggested try another pharmacy maybe?  As far as [facility] goes, they wouldn't be able to get it at any location.   numbness tingling paresthesias slurred speech or blurry vision  He states that he has been taking his medications regularly  He states that he has not had any relief from any medications  Headache    Pertinent negatives include no abdominal pain, back pain, coughing, dizziness, ear pain, eye redness, fever, nausea, numbness, sinus pressure or sore throat  Review of Systems   Constitutional: Negative for activity change, chills, fatigue and fever  HENT: Negative for congestion, ear pain, sinus pressure and sore throat  Eyes: Negative for redness, itching and visual disturbance  Respiratory: Negative for cough and shortness of breath  Cardiovascular: Negative for chest pain and palpitations  Gastrointestinal: Negative for abdominal pain, diarrhea and nausea  Endocrine: Negative for cold intolerance and heat intolerance  Genitourinary: Negative for dysuria, flank pain and frequency  Musculoskeletal: Negative for arthralgias, back pain, gait problem and myalgias  Skin: Negative for color change  Allergic/Immunologic: Negative for environmental allergies  Neurological: Positive for headaches  Negative for dizziness and numbness  Psychiatric/Behavioral: Negative for behavioral problems and sleep disturbance  The following portions of the patient's history were reviewed and updated as appropriate : past family history, past medical history, past social history and past surgical history  Objective:    Vitals:    05/24/18 1456   BP: 136/70   BP Location: Left arm   Patient Position: Sitting   Cuff Size: Standard   Pulse: 81   Resp: 16   Temp: 99 4 °F (37 4 °C)   TempSrc: Tympanic   SpO2: 94%   Weight: 98 5 kg (217 lb 3 2 oz)   Height: 5' 7 72" (1 72 m)        Physical Exam   Constitutional: He is oriented to person, place, and time  He appears well-developed and well-nourished  HENT:   Head: Normocephalic and atraumatic     Nose: Nose normal    Mouth/Throat: No oropharyngeal exudate  Eyes: Pupils are equal, round, and reactive to light  Right eye exhibits no discharge  Left eye exhibits no discharge  Neck: Normal range of motion  Neck supple  No tracheal deviation present  Cardiovascular: Normal rate, regular rhythm and intact distal pulses  Exam reveals no gallop and no friction rub  No murmur heard  Pulses:       Dorsalis pedis pulses are 2+ on the right side, and 2+ on the left side  Posterior tibial pulses are 2+ on the right side, and 2+ on the left side  Pulmonary/Chest: Effort normal and breath sounds normal  No respiratory distress  He has no wheezes  He has no rales  Abdominal: Soft  Bowel sounds are normal  He exhibits no distension  There is no tenderness  There is no rebound and no guarding  Musculoskeletal: Normal range of motion  He exhibits no edema  Lymphadenopathy:        Head (right side): No submental and no submandibular adenopathy present  Head (left side): No submental and no submandibular adenopathy present  He has no cervical adenopathy  Right cervical: No superficial cervical, no deep cervical and no posterior cervical adenopathy present  Left cervical: No superficial cervical, no deep cervical and no posterior cervical adenopathy present  Neurological: He is alert and oriented to person, place, and time  No cranial nerve deficit or sensory deficit  Skin: Skin is warm, dry and intact  Psychiatric: His speech is normal and behavior is normal  Judgment normal  His mood appears not anxious  Cognition and memory are normal  He does not exhibit a depressed mood  Vitals reviewed

## 2021-06-01 ENCOUNTER — APPOINTMENT (OUTPATIENT)
Dept: PHYSICAL THERAPY | Facility: MEDICAL CENTER | Age: 51
End: 2021-06-01
Payer: COMMERCIAL

## 2021-06-08 ENCOUNTER — APPOINTMENT (OUTPATIENT)
Dept: PHYSICAL THERAPY | Facility: MEDICAL CENTER | Age: 51
End: 2021-06-08
Payer: COMMERCIAL

## 2021-06-10 ENCOUNTER — APPOINTMENT (OUTPATIENT)
Dept: PHYSICAL THERAPY | Facility: MEDICAL CENTER | Age: 51
End: 2021-06-10
Payer: COMMERCIAL

## 2021-06-15 ENCOUNTER — OFFICE VISIT (OUTPATIENT)
Dept: PHYSICAL THERAPY | Facility: MEDICAL CENTER | Age: 51
End: 2021-06-15
Payer: COMMERCIAL

## 2021-06-15 DIAGNOSIS — G54.0 THORACIC OUTLET SYNDROME OF RIGHT THORACIC OUTLET: Primary | ICD-10-CM

## 2021-06-15 PROCEDURE — 97110 THERAPEUTIC EXERCISES: CPT

## 2021-06-15 PROCEDURE — 97112 NEUROMUSCULAR REEDUCATION: CPT

## 2021-06-15 PROCEDURE — 97140 MANUAL THERAPY 1/> REGIONS: CPT

## 2021-06-17 ENCOUNTER — APPOINTMENT (OUTPATIENT)
Dept: PHYSICAL THERAPY | Facility: MEDICAL CENTER | Age: 51
End: 2021-06-17
Payer: COMMERCIAL

## 2021-06-22 ENCOUNTER — OFFICE VISIT (OUTPATIENT)
Dept: PHYSICAL THERAPY | Facility: MEDICAL CENTER | Age: 51
End: 2021-06-22
Payer: COMMERCIAL

## 2021-06-22 DIAGNOSIS — G54.0 THORACIC OUTLET SYNDROME OF RIGHT THORACIC OUTLET: Primary | ICD-10-CM

## 2021-06-22 PROCEDURE — 97140 MANUAL THERAPY 1/> REGIONS: CPT

## 2021-06-22 PROCEDURE — 97110 THERAPEUTIC EXERCISES: CPT

## 2021-06-22 NOTE — PROGRESS NOTES
Daily Note/Discharge     Today's date: 2021  Patient name: Josey Mix  : 1970  MRN: [id number]  Referring provider: Lazarus Em, DO  Dx:   Encounter Diagnosis     ICD-10-CM    1  Thoracic outlet syndrome of right thoracic outlet  G54 0                   Subjective: Pt reports he saw Dr  On Friday  Ok to follow up as needed but he explained he is happy with his progress  He wants to look further into his disc issues in his cervical spine  Objective: See treatment diary below      Assessment: Tolerated treatment well   Patient exhibited good technique with therapeutic exercises and would benefit from continued PT  [employer] insurance was only able to approve today's visit- will plan on D/C at this time  Today Pt had increased tension to is cervical musculuture as well has his GHJ and posterior musculature  Cervical AROM- mild limitations with lateral flexion BL  Sh strength 4+/5 throughout   II R/L  90/115lbs    Plan: D/C at this time     Precautions: DOS 21  HEP: cervical rotation, ext, flexion stretch; wand overhead AA flex, doorway stretch, diaphragmatic breathing, upper trunk rotation, PNG's median bias, prone scap retraction, ext, abd    Manuals  5/4 5/6 5/13 5/18 5/20 5/25 6/15 6/22   Gentle cervical PROM supine and added ext off table 10 10 10 10 10 10 10 10 10 10    SCM STM          5    Shoulder PROM/AA /pec stretch PNGs 10 5 5 5 10 10 10 15 10 15     20 15 15 15 20 20 20 25 20 30    Neuro Re-Ed                                                                                                                Ther Ex              TB row RTB 3x10 RTB 3x10 GTB 3x10 RTB 3x10 GTB 2x10 GTB 2x10 RTB 2x10 GTB 2x10 GTB 2x10 GTB 2x10     TB robberies RTB 3x10 RTB 3x10 GTB 3x10 RTB 3x10 GTB 2x10 GTB 2x10 RTB 2x10 GTB 2x10 GTB 2x10 GTB 2x10    TB lawnmower RTB 3x10 RTB 3x10 GTB 3x10 RTB 3x10 GTB 2x10 GTB 2x10 RTB 2x10 GTB 2x10 GTB 2x10 GTB 2x10    TB extension RTB 3x10 RTB 3x10 GTB 3x10 RTB 3x10 RTB 2x10 GTB 2x10 RTB 2x10 GTB 2x10 GTB 2x10 GTB 2x10    TB PNF D2 RTB 3x10 RTB 3x10 GTB 3x10 RTB 3x10 GTB 2x10 GTB 2x10 RTB 2x10 GTB 2x10 GTB 2x10 GTB 2x10    Prone push ups 15x 15x 15x 15x 15x 15x 15x 15 15 15    Wall slides BL- lean 15x 15x 15x 15x 15x 15x 15x 15 15 15                  Standing ER- foam roll against wall 3# 3x10 3# 3x10 3# 3x10 3# 3x10 3# 3x10 3# 3x10 3# 3x10 4# 2x10 + raises 4# 2x10 + raises 3# 2x10    Ball roll on plinth/lat stretch 15x each 15x each 15x each 15x 15x 15x 15x 15x 15x 15x    flexbar stability     1 min x2 NP         20/10 15/10 15/10 10/10 15/10  15/10 15/10 15/10 15/10    Ther Activity              UBE warmup   3/3min 3/3min 3/3min 3/3min 3/3min 3/3min 3/3min 3/3min                  Gait Training                                          Modalities              MH 10min 10 min            CP

## 2021-06-23 ENCOUNTER — TELEPHONE (OUTPATIENT)
Dept: FAMILY MEDICINE CLINIC | Facility: CLINIC | Age: 51
End: 2021-06-23

## 2021-06-23 NOTE — TELEPHONE ENCOUNTER
Patients wife called and stated patient got cut by a nail and wanted to know when his last tetanus shot was  I did not see it in his immunization list  Should patient come in and get a tetanus shot in our office?

## 2021-06-24 ENCOUNTER — APPOINTMENT (OUTPATIENT)
Dept: PHYSICAL THERAPY | Facility: MEDICAL CENTER | Age: 51
End: 2021-06-24
Payer: COMMERCIAL

## 2021-06-26 DIAGNOSIS — M54.12 CERVICAL RADICULOPATHY: ICD-10-CM

## 2021-06-26 DIAGNOSIS — R51.9 CHRONIC DAILY HEADACHE: ICD-10-CM

## 2021-06-28 ENCOUNTER — CLINICAL SUPPORT (OUTPATIENT)
Dept: FAMILY MEDICINE CLINIC | Facility: CLINIC | Age: 51
End: 2021-06-28
Payer: COMMERCIAL

## 2021-06-28 DIAGNOSIS — Z23 NEED FOR VACCINATION: Primary | ICD-10-CM

## 2021-06-28 PROCEDURE — 90471 IMMUNIZATION ADMIN: CPT | Performed by: FAMILY MEDICINE

## 2021-06-28 PROCEDURE — 90715 TDAP VACCINE 7 YRS/> IM: CPT | Performed by: FAMILY MEDICINE

## 2021-06-28 RX ORDER — PREGABALIN 50 MG/1
50 CAPSULE ORAL 2 TIMES DAILY
Qty: 60 CAPSULE | Refills: 0 | Status: SHIPPED | OUTPATIENT
Start: 2021-06-28 | End: 2021-08-25 | Stop reason: SDUPTHER

## 2021-06-29 ENCOUNTER — APPOINTMENT (OUTPATIENT)
Dept: PHYSICAL THERAPY | Facility: MEDICAL CENTER | Age: 51
End: 2021-06-29
Payer: COMMERCIAL

## 2021-08-25 DIAGNOSIS — R51.9 CHRONIC DAILY HEADACHE: ICD-10-CM

## 2021-08-25 DIAGNOSIS — M54.12 CERVICAL RADICULOPATHY: ICD-10-CM

## 2021-08-27 RX ORDER — PREGABALIN 50 MG/1
50 CAPSULE ORAL 2 TIMES DAILY
Qty: 60 CAPSULE | Refills: 0 | Status: SHIPPED | OUTPATIENT
Start: 2021-08-27 | End: 2021-11-04 | Stop reason: SDUPTHER

## 2021-08-27 NOTE — TELEPHONE ENCOUNTER
I called and spoke to Kinsey Mattson giving him the results of his CT pt stated he already knew Rhae Simpson called him 
[facility], verbal from radiology on CT was no bleed or mass  I tole them patient could go home    Thanks, Stephanie Hunter
Please call patient, please advise him that his recent CT scan was negative for abnormality such as aneurysm    Thank you
No
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
## 2021-11-04 DIAGNOSIS — M54.12 CERVICAL RADICULOPATHY: ICD-10-CM

## 2021-11-04 DIAGNOSIS — R51.9 CHRONIC DAILY HEADACHE: ICD-10-CM

## 2021-11-05 RX ORDER — PREGABALIN 50 MG/1
50 CAPSULE ORAL 2 TIMES DAILY
Qty: 60 CAPSULE | Refills: 0 | Status: SHIPPED | OUTPATIENT
Start: 2021-11-05 | End: 2022-01-10 | Stop reason: SDUPTHER

## 2022-01-10 DIAGNOSIS — M54.12 CERVICAL RADICULOPATHY: ICD-10-CM

## 2022-01-10 DIAGNOSIS — R51.9 CHRONIC DAILY HEADACHE: ICD-10-CM

## 2022-01-10 RX ORDER — PREGABALIN 50 MG/1
50 CAPSULE ORAL 2 TIMES DAILY
Qty: 60 CAPSULE | Refills: 0 | Status: SHIPPED | OUTPATIENT
Start: 2022-01-10 | End: 2022-03-21 | Stop reason: SDUPTHER

## 2022-03-31 ENCOUNTER — OFFICE VISIT (OUTPATIENT)
Dept: FAMILY MEDICINE CLINIC | Facility: CLINIC | Age: 52
End: 2022-03-31
Payer: COMMERCIAL

## 2022-03-31 VITALS
BODY MASS INDEX: 33.33 KG/M2 | HEIGHT: 69 IN | DIASTOLIC BLOOD PRESSURE: 86 MMHG | TEMPERATURE: 98.8 F | RESPIRATION RATE: 16 BRPM | HEART RATE: 66 BPM | SYSTOLIC BLOOD PRESSURE: 124 MMHG | OXYGEN SATURATION: 98 % | WEIGHT: 225 LBS

## 2022-03-31 DIAGNOSIS — Z13.0 SCREENING FOR IRON DEFICIENCY ANEMIA: ICD-10-CM

## 2022-03-31 DIAGNOSIS — I10 ESSENTIAL HYPERTENSION: ICD-10-CM

## 2022-03-31 DIAGNOSIS — M54.12 CERVICAL RADICULOPATHY: Primary | ICD-10-CM

## 2022-03-31 DIAGNOSIS — I25.10 MILD CORONARY ARTERY DISEASE: ICD-10-CM

## 2022-03-31 DIAGNOSIS — Z13.29 SCREENING FOR THYROID DISORDER: ICD-10-CM

## 2022-03-31 DIAGNOSIS — Z23 NEED FOR INFLUENZA VACCINATION: ICD-10-CM

## 2022-03-31 DIAGNOSIS — R51.9 CHRONIC DAILY HEADACHE: ICD-10-CM

## 2022-03-31 DIAGNOSIS — Z13.1 SCREENING FOR DIABETES MELLITUS: ICD-10-CM

## 2022-03-31 DIAGNOSIS — E78.5 HYPERLIPIDEMIA, UNSPECIFIED HYPERLIPIDEMIA TYPE: ICD-10-CM

## 2022-03-31 PROCEDURE — 99214 OFFICE O/P EST MOD 30 MIN: CPT | Performed by: FAMILY MEDICINE

## 2022-03-31 NOTE — PROGRESS NOTES
Assessment/Plan:    1  Cervical radiculopathy/Chronic daily headache  Patient's symptoms appear to be persistent  At this time, he was advised to continue with his current treatment of Lyrica PDMP does not show any abnormalities  Continue with his topical treatments as well  Will refer patient to [facility] of physiatry for further evaluation   - Ambulatory Referral to Physiatry; Future    2  Essential hypertension/Mild coronary artery disease  Stable today  Continue with routine home monitoring as well as his regular follow-up with Cardiology  He denies any chest pain palpitations or lightheadedness  He appears hemodynamically stable  Continue with his current treatment of nifedipine as well as his lisinopril  - Comprehensive metabolic panel; Future    3  Hyperlipidemia, unspecified hyperlipidemia type  Continue with a strict low-fat/low-cholesterol diet  Recheck lipid panel  Follow-up patient 6 months  - Lipid Panel with Direct LDL reflex; Future  - TSH, 3rd generation with Free T4 reflex; Future      BMI Counseling: Body mass index is 33 23 kg/m²  The BMI is above normal  Nutrition recommendations include decreasing portion sizes, encouraging healthy choices of fruits and vegetables, decreasing fast food intake, consuming healthier snacks and limiting drinks that contain sugar  Exercise recommendations include moderate physical activity 150 minutes/week and exercising 3-5 times per week  No pharmacotherapy was ordered  Patient referred to PCP  Rationale for BMI follow-up plan is due to patient being overweight or obese  Depression Screening and Follow-up Plan: Patient was screened for depression during today's encounter  They screened negative with a PHQ-2 score of 0  There are no diagnoses linked to this encounter        Subjective:       Chief Complaint   Patient presents with    Follow-up     Med-check- Lyrica for future refills       Patient ID: Sharita Meade is a 46 y o  male presents today for a follow-up on his chronic conditions  He has chronic cervical radiculopathy, chronic persistent headaches, hypertension, dyslipidemia as well as mild CAD  He has been taking his medications regularly  He denies adverse reactions with medications  He unfortunately has been having persistent problems with his Cervicalgia as well as his headaches  He has been using over-the-counter lidocaine/CBD cream topically for symptom relief  He would like to consider further evaluation with a physiatrist     HPI    Review of Systems   Constitutional: Negative for activity change, chills, fatigue and fever  HENT: Negative for congestion, ear pain, sinus pressure and sore throat  Eyes: Negative for redness, itching and visual disturbance  Respiratory: Negative for cough and shortness of breath  Cardiovascular: Negative for chest pain and palpitations  Gastrointestinal: Negative for abdominal pain, diarrhea and nausea  Endocrine: Negative for cold intolerance and heat intolerance  Genitourinary: Negative for dysuria, flank pain and frequency  Musculoskeletal: Negative for arthralgias, back pain, gait problem and myalgias  Skin: Negative for color change  Allergic/Immunologic: Negative for environmental allergies  Neurological: Negative for dizziness, numbness and headaches  Psychiatric/Behavioral: Negative for behavioral problems and sleep disturbance  The following portions of the patient's history were reviewed and updated as appropriate : past family history, past medical history, past social history and past surgical history      Current Outpatient Medications:     amitriptyline (ELAVIL) 25 mg tablet, Take 25 mg by mouth daily at bedtime, Disp: , Rfl:     cetirizine (ZyrTEC) 10 mg tablet, Take 10 mg by mouth as needed  , Disp: , Rfl:     clindamycin (CLEOCIN T) 1 % external solution, Apply topically 2 (two) times a day, Disp: 30 mL, Rfl: 0    lisinopril (ZESTRIL) 10 mg tablet, Take 1 tablet (10 mg total) by mouth daily, Disp: 30 tablet, Rfl: 3    Multiple Vitamin (MULTIVITAMIN) capsule, Take 1 capsule by mouth daily, Disp: , Rfl:     mupirocin (BACTROBAN) 2 % ointment, Apply topically 3 (three) times a day, Disp: 22 g, Rfl: 0    omeprazole (PriLOSEC) 40 MG capsule, Take 1 capsule (40 mg total) by mouth every 12 (twelve) hours (Patient taking differently: Take 40 mg by mouth daily as needed ), Disp: 60 capsule, Rfl: 5    pregabalin (LYRICA) 50 mg capsule, Take 1 capsule (50 mg total) by mouth 2 (two) times a day, Disp: 60 capsule, Rfl: 0    Probiotic Product (PROBIOTIC ADVANCED PO), Take by mouth daily, Disp: , Rfl:     ketoconazole (NIZORAL) 2 % shampoo, Apply 1 application topically once for 1 dose, Disp: 120 mL, Rfl: 1    NIFEdipine (PROCARDIA XL) 60 mg 24 hr tablet, Take 60 mg by mouth daily, Disp: , Rfl:          Objective:         Vitals:    03/31/22 1610   BP: 124/86   BP Location: Left arm   Patient Position: Sitting   Cuff Size: Large   Pulse: 66   Resp: 16   Temp: 98 8 °F (37 1 °C)   TempSrc: Tympanic   SpO2: 98%   Weight: 102 kg (225 lb)   Height: 5' 9" (1 753 m)     Physical Exam  Vitals reviewed  Constitutional:       Appearance: He is well-developed  HENT:      Head: Normocephalic and atraumatic  Nose: Nose normal       Mouth/Throat:      Pharynx: No oropharyngeal exudate  Eyes:      General: No scleral icterus  Right eye: No discharge  Left eye: No discharge  Pupils: Pupils are equal, round, and reactive to light  Neck:      Trachea: No tracheal deviation  Cardiovascular:      Rate and Rhythm: Normal rate and regular rhythm  Pulses:           Dorsalis pedis pulses are 2+ on the right side and 2+ on the left side  Posterior tibial pulses are 2+ on the right side and 2+ on the left side  Heart sounds: Normal heart sounds  No murmur heard  No friction rub  No gallop      Pulmonary:      Effort: Pulmonary effort is normal  No respiratory distress  Breath sounds: Normal breath sounds  No wheezing or rales  Abdominal:      General: Bowel sounds are normal  There is no distension  Palpations: Abdomen is soft  Tenderness: There is no abdominal tenderness  There is no guarding or rebound  Musculoskeletal:         General: Normal range of motion  Cervical back: Normal range of motion and neck supple  Lymphadenopathy:      Head:      Right side of head: No submental or submandibular adenopathy  Left side of head: No submental or submandibular adenopathy  Cervical: No cervical adenopathy  Right cervical: No superficial, deep or posterior cervical adenopathy  Left cervical: No superficial, deep or posterior cervical adenopathy  Skin:     General: Skin is warm and dry  Findings: No erythema  Neurological:      Mental Status: He is alert and oriented to person, place, and time  Cranial Nerves: No cranial nerve deficit  Sensory: No sensory deficit  Psychiatric:         Mood and Affect: Mood is not anxious or depressed  Speech: Speech normal          Behavior: Behavior normal          Thought Content:  Thought content normal          Judgment: Judgment normal

## 2022-04-28 ENCOUNTER — TELEMEDICINE (OUTPATIENT)
Dept: FAMILY MEDICINE CLINIC | Facility: CLINIC | Age: 52
End: 2022-04-28
Payer: COMMERCIAL

## 2022-04-28 DIAGNOSIS — U07.1 COVID-19: Primary | ICD-10-CM

## 2022-04-28 PROCEDURE — 1036F TOBACCO NON-USER: CPT | Performed by: FAMILY MEDICINE

## 2022-04-28 PROCEDURE — 99213 OFFICE O/P EST LOW 20 MIN: CPT | Performed by: FAMILY MEDICINE
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
## 2022-04-28 NOTE — LETTER
[address]   [facility] [address]  [phone]  Dept: [phone]    April 28, 2022    Patient: Glenna Sanchez  YOB: 1970    Glenna Sanchez was seen and evaluated at our [facility]  Please note if Covid and Flu tests are negative, they may return to work when fever free for 24 hours without the use of a fever reducing agent  If Covid or Flu test is positive, they may return to work on 5/2/22, as this is 5 days from the onset of symptoms  Upon return, they must then adhere to strict masking for an additional 5 days      Sincerely,    Mamadou Hsu, DO

## 2022-06-01 DIAGNOSIS — R51.9 CHRONIC DAILY HEADACHE: ICD-10-CM

## 2022-06-01 DIAGNOSIS — M54.12 CERVICAL RADICULOPATHY: ICD-10-CM

## 2022-06-01 RX ORDER — PREGABALIN 50 MG/1
CAPSULE ORAL
Qty: 60 CAPSULE | Refills: 0 | Status: SHIPPED | OUTPATIENT
Start: 2022-06-01 | End: 2022-08-08 | Stop reason: SDUPTHER

## 2022-08-05 ENCOUNTER — PATIENT MESSAGE (OUTPATIENT)
Dept: FAMILY MEDICINE CLINIC | Facility: CLINIC | Age: 52
End: 2022-08-05

## 2022-08-08 DIAGNOSIS — R51.9 CHRONIC DAILY HEADACHE: ICD-10-CM

## 2022-08-08 DIAGNOSIS — M54.12 CERVICAL RADICULOPATHY: ICD-10-CM

## 2022-08-08 RX ORDER — PREGABALIN 50 MG/1
50 CAPSULE ORAL 2 TIMES DAILY
Qty: 60 CAPSULE | Refills: 0 | Status: SHIPPED | OUTPATIENT
Start: 2022-08-08 | End: 2022-09-26 | Stop reason: SDUPTHER

## 2022-09-22 DIAGNOSIS — R51.9 CHRONIC DAILY HEADACHE: ICD-10-CM

## 2022-09-22 DIAGNOSIS — M54.12 CERVICAL RADICULOPATHY: ICD-10-CM

## 2022-09-23 ENCOUNTER — RA CDI HCC (OUTPATIENT)
Dept: OTHER | Facility: HOSPITAL | Age: 52
End: 2022-09-23

## 2022-09-23 RX ORDER — PREGABALIN 50 MG/1
50 CAPSULE ORAL 2 TIMES DAILY
Qty: 60 CAPSULE | Refills: 0 | OUTPATIENT
Start: 2022-09-23

## 2022-09-23 NOTE — PROGRESS NOTES
[facility]  coding opportunities          Chart Reviewed number of suggestions sent to Provider: 1   [id number]    Patients Insurance        Commercial Insurance: [employer]

## 2022-09-23 NOTE — TELEPHONE ENCOUNTER
Please call patient, he is due for a checkup this month  I also do not see any records from pain management  Please request these records    Thank you

## 2022-09-26 DIAGNOSIS — M54.12 CERVICAL RADICULOPATHY: ICD-10-CM

## 2022-09-26 DIAGNOSIS — R51.9 CHRONIC DAILY HEADACHE: ICD-10-CM

## 2022-09-26 RX ORDER — PREGABALIN 50 MG/1
50 CAPSULE ORAL 2 TIMES DAILY
Qty: 60 CAPSULE | Refills: 0 | Status: SHIPPED | OUTPATIENT
Start: 2022-09-26 | End: 2022-10-18 | Stop reason: SDUPTHER

## 2022-09-26 NOTE — TELEPHONE ENCOUNTER
Pt called stated that he has an appt already on Friday, and gave me information as to who he saw  I called the office to get the records from them and they were closed  Will try again tomorrow during hours they are open

## 2022-09-26 NOTE — TELEPHONE ENCOUNTER
PT wife called [phone] stating that PT is completely out of medication pregabalin (Millie Lowry) for pain   Wanted to know if something could be called in until he can get in to see Dr Stepan Bobo

## 2022-10-13 RX ORDER — LISINOPRIL 40 MG/1
TABLET ORAL
COMMUNITY
Start: 2022-09-09

## 2022-10-18 ENCOUNTER — OFFICE VISIT (OUTPATIENT)
Dept: FAMILY MEDICINE CLINIC | Facility: CLINIC | Age: 52
End: 2022-10-18
Payer: COMMERCIAL

## 2022-10-18 VITALS
DIASTOLIC BLOOD PRESSURE: 76 MMHG | SYSTOLIC BLOOD PRESSURE: 114 MMHG | BODY MASS INDEX: 33.27 KG/M2 | RESPIRATION RATE: 18 BRPM | HEIGHT: 69 IN | OXYGEN SATURATION: 97 % | HEART RATE: 70 BPM | TEMPERATURE: 98.4 F | WEIGHT: 224.6 LBS

## 2022-10-18 DIAGNOSIS — Z13.29 SCREENING FOR THYROID DISORDER: ICD-10-CM

## 2022-10-18 DIAGNOSIS — K62.89 RECTAL PAIN: ICD-10-CM

## 2022-10-18 DIAGNOSIS — I10 ESSENTIAL HYPERTENSION: ICD-10-CM

## 2022-10-18 DIAGNOSIS — K21.9 GASTROESOPHAGEAL REFLUX DISEASE WITHOUT ESOPHAGITIS: ICD-10-CM

## 2022-10-18 DIAGNOSIS — I25.10 MILD CORONARY ARTERY DISEASE: ICD-10-CM

## 2022-10-18 DIAGNOSIS — R51.9 CHRONIC DAILY HEADACHE: ICD-10-CM

## 2022-10-18 DIAGNOSIS — Z13.1 SCREENING FOR DIABETES MELLITUS: ICD-10-CM

## 2022-10-18 DIAGNOSIS — E78.5 HYPERLIPIDEMIA, UNSPECIFIED HYPERLIPIDEMIA TYPE: ICD-10-CM

## 2022-10-18 DIAGNOSIS — Z23 NEED FOR INFLUENZA VACCINATION: Primary | ICD-10-CM

## 2022-10-18 DIAGNOSIS — M54.12 CERVICAL RADICULOPATHY: ICD-10-CM

## 2022-10-18 DIAGNOSIS — Z12.5 SCREENING FOR PROSTATE CANCER: ICD-10-CM

## 2022-10-18 DIAGNOSIS — M79.18 MYOFASCIAL PAIN SYNDROME: ICD-10-CM

## 2022-10-18 PROCEDURE — 90471 IMMUNIZATION ADMIN: CPT | Performed by: FAMILY MEDICINE

## 2022-10-18 PROCEDURE — 99215 OFFICE O/P EST HI 40 MIN: CPT | Performed by: FAMILY MEDICINE

## 2022-10-18 PROCEDURE — 90682 RIV4 VACC RECOMBINANT DNA IM: CPT | Performed by: FAMILY MEDICINE

## 2022-10-18 RX ORDER — PREGABALIN 50 MG/1
CAPSULE ORAL
Qty: 90 CAPSULE | Refills: 0 | Status: SHIPPED | OUTPATIENT
Start: 2022-10-18 | End: 2022-11-17

## 2022-10-18 RX ORDER — METHOCARBAMOL 750 MG/1
750 TABLET, FILM COATED ORAL EVERY 8 HOURS SCHEDULED
Qty: 90 TABLET | Refills: 0 | Status: SHIPPED | OUTPATIENT
Start: 2022-10-18

## 2022-10-18 NOTE — PROGRESS NOTES
Assessment/Plan:   1  Rectal pain  Unclear as to exact cause of his symptoms today  At this time, symptoms appear concerning for a possible prostatitis  His symptoms do not appear as problematic today  Will hold off on starting treatment  Will check PSA level  He was advised on importance of maintaining proper hydration    2  Cervical radiculopathy  Symptoms appear persisting  At this time, he has been following up with Pain Management  Will continue with his current evaluation  Will continue as well with current treatment of Lyrica as well as his methocarbamol   - pregabalin (LYRICA) 50 mg capsule; Take 1 capsule (50 mg total) by mouth every morning AND 2 capsules (100 mg total) daily at bedtime  Dispense: 90 capsule; Refill: 0  - methocarbamol (ROBAXIN) 750 mg tablet; Take 1 tablet (750 mg total) by mouth every 8 (eight) hours  Dispense: 90 tablet; Refill: 0    3  Essential hypertension  Stable today  Continue with routine home monitoring as well as his current treatment with lisinopril as well as nifedipine    4  Mild coronary artery disease  Patient appears clinically and hemodynamically stable  He denies any chest pain or palpitations  Continue with current blood pressure as well as his cholesterol control  5  Chronic daily headache  Stable  Continue with routine monitoring  Continue with current treatment of Lyrica  - pregabalin (LYRICA) 50 mg capsule; Take 1 capsule (50 mg total) by mouth every morning AND 2 capsules (100 mg total) daily at bedtime  Dispense: 90 capsule; Refill: 0    6  Gastroesophageal reflux disease without esophagitis  Patient states that he has had exacerbations when he starts taking naproxen  He may take omeprazole p r n  for symptom relief follow-up in 6 months  7  Need for influenza vaccination  - influenza vaccine, quadrivalent, recombinant, PF, 0 5 mL, for patients 18 yr+ (FLUBLOK)    8  Myofascial pain syndrome  - methocarbamol (ROBAXIN) 750 mg tablet;  Take 1 tablet (750 mg total) by mouth every 8 (eight) hours  Dispense: 90 tablet; Refill: 0                 Diagnoses and all orders for this visit:    Need for influenza vaccination  -     influenza vaccine, quadrivalent, recombinant, PF, 0 5 mL, for patients 18 yr+ (FLUBLOK)    Cervical radiculopathy  -     pregabalin (LYRICA) 50 mg capsule; Take 1 capsule (50 mg total) by mouth every morning AND 2 capsules (100 mg total) daily at bedtime   -     methocarbamol (ROBAXIN) 750 mg tablet; Take 1 tablet (750 mg total) by mouth every 8 (eight) hours    Chronic daily headache  -     pregabalin (LYRICA) 50 mg capsule; Take 1 capsule (50 mg total) by mouth every morning AND 2 capsules (100 mg total) daily at bedtime  Myofascial pain syndrome  -     methocarbamol (ROBAXIN) 750 mg tablet; Take 1 tablet (750 mg total) by mouth every 8 (eight) hours    Screening for prostate cancer  -     PSA, Total Screen; Future    Hyperlipidemia, unspecified hyperlipidemia type  -     Comprehensive metabolic panel; Future  -     Lipid Panel with Direct LDL reflex; Future    Mild coronary artery disease    Screening for thyroid disorder  -     TSH, 3rd generation with Free T4 reflex; Future    Screening for diabetes mellitus  -     Hemoglobin A1C; Future          Subjective:       Chief Complaint   Patient presents with   • Follow-up      Patient ID: Rhina Paget is a 45 y o  male presents today for a follow-up on his chronic conditions  He has his chronic myofascial pain, previous thoracic outlet syndrome, cervical radiculopathy, hypertension, mild CAD, chronic headaches, GERD  He has been taking his medications regularly  He denies adverse reactions with medications  He has been continuing to follow-up with his pain specialist   He has had lower lumbar/rectal pain for the past few months    He was seen by his pain specialist and was informed that his symptoms were unlikely spinal     HPI    Review of Systems   Constitutional: Negative for activity change, chills, fatigue and fever  HENT: Negative for congestion, ear pain, sinus pressure and sore throat  Eyes: Negative for redness, itching and visual disturbance  Respiratory: Negative for cough and shortness of breath  Cardiovascular: Negative for chest pain and palpitations  Gastrointestinal: Negative for abdominal pain, diarrhea and nausea  Endocrine: Negative for cold intolerance and heat intolerance  Genitourinary: Negative for dysuria, flank pain and frequency  Musculoskeletal: Negative for arthralgias, back pain, gait problem and myalgias  Skin: Negative for color change  Allergic/Immunologic: Negative for environmental allergies  Neurological: Negative for dizziness, numbness and headaches  Psychiatric/Behavioral: Negative for behavioral problems and sleep disturbance  The following portions of the patient's history were reviewed and updated as appropriate : past family history, past medical history, past social history and past surgical history      Current Outpatient Medications:   •  cetirizine (ZyrTEC) 10 mg tablet, Take 10 mg by mouth as needed  , Disp: , Rfl:   •  clindamycin (CLEOCIN T) 1 % external solution, Apply topically 2 (two) times a day, Disp: 30 mL, Rfl: 0  •  indomethacin (INDOCIN) 25 mg capsule, Take 1 capsule (25 mg total) by mouth 3 (three) times a day with meals, Disp: 90 capsule, Rfl: 0  •  lisinopril (ZESTRIL) 10 mg tablet, Take 1 tablet (10 mg total) by mouth daily, Disp: 30 tablet, Rfl: 3  •  lisinopril (ZESTRIL) 40 mg tablet, , Disp: , Rfl:   •  methocarbamol (ROBAXIN) 750 mg tablet, Take 1 tablet (750 mg total) by mouth every 8 (eight) hours, Disp: 90 tablet, Rfl: 0  •  Multiple Vitamin (MULTIVITAMIN) capsule, Take 1 capsule by mouth daily, Disp: , Rfl:   •  mupirocin (BACTROBAN) 2 % ointment, Apply topically 3 (three) times a day, Disp: 22 g, Rfl: 0  •  omeprazole (PriLOSEC) 20 mg delayed release capsule, Take 1 capsule (20 mg total) by mouth daily, Disp: 30 capsule, Rfl: 1  •  pregabalin (LYRICA) 50 mg capsule, Take 1 capsule (50 mg total) by mouth every morning AND 2 capsules (100 mg total) daily at bedtime  , Disp: 90 capsule, Rfl: 0  •  Probiotic Product (PROBIOTIC ADVANCED PO), Take by mouth daily, Disp: , Rfl:   •  ketoconazole (NIZORAL) 2 % shampoo, Apply 1 application topically once for 1 dose, Disp: 120 mL, Rfl: 1  •  NIFEdipine (PROCARDIA XL) 60 mg 24 hr tablet, Take 60 mg by mouth daily, Disp: , Rfl:          Objective:         Vitals:    10/18/22 1157   BP: 114/76   Pulse: 70   Resp: 18   Temp: 98 4 °F (36 9 °C)   TempSrc: Tympanic   SpO2: 97%   Weight: 102 kg (224 lb 9 6 oz)   Height: 5' 9" (1 753 m)     Physical Exam  Vitals reviewed  Constitutional:       Appearance: He is well-developed  HENT:      Head: Normocephalic and atraumatic  Nose: Nose normal       Mouth/Throat:      Pharynx: No oropharyngeal exudate  Eyes:      General: No scleral icterus  Right eye: No discharge  Left eye: No discharge  Pupils: Pupils are equal, round, and reactive to light  Neck:      Trachea: No tracheal deviation  Cardiovascular:      Rate and Rhythm: Normal rate and regular rhythm  Pulses:           Dorsalis pedis pulses are 2+ on the right side and 2+ on the left side  Posterior tibial pulses are 2+ on the right side and 2+ on the left side  Heart sounds: Normal heart sounds  No murmur heard  No friction rub  No gallop  Pulmonary:      Effort: Pulmonary effort is normal  No respiratory distress  Breath sounds: Normal breath sounds  No wheezing or rales  Abdominal:      General: Bowel sounds are normal  There is no distension  Palpations: Abdomen is soft  Tenderness: There is no abdominal tenderness  There is no guarding or rebound  Musculoskeletal:         General: Normal range of motion  Cervical back: Normal range of motion and neck supple     Lymphadenopathy: Head:      Right side of head: No submental or submandibular adenopathy  Left side of head: No submental or submandibular adenopathy  Cervical: No cervical adenopathy  Right cervical: No superficial, deep or posterior cervical adenopathy  Left cervical: No superficial, deep or posterior cervical adenopathy  Skin:     General: Skin is warm and dry  Findings: No erythema  Neurological:      Mental Status: He is alert and oriented to person, place, and time  Cranial Nerves: No cranial nerve deficit  Sensory: No sensory deficit  Psychiatric:         Mood and Affect: Mood is not anxious or depressed  Speech: Speech normal          Behavior: Behavior normal          Thought Content:  Thought content normal          Judgment: Judgment normal

## 2022-10-19 ENCOUNTER — APPOINTMENT (OUTPATIENT)
Dept: LAB | Facility: CLINIC | Age: 52
End: 2022-10-19
Payer: COMMERCIAL

## 2022-10-19 DIAGNOSIS — Z13.0 SCREENING FOR IRON DEFICIENCY ANEMIA: ICD-10-CM

## 2022-10-19 DIAGNOSIS — Z13.29 SCREENING FOR THYROID DISORDER: ICD-10-CM

## 2022-10-19 DIAGNOSIS — Z13.1 SCREENING FOR DIABETES MELLITUS: ICD-10-CM

## 2022-10-19 DIAGNOSIS — E78.5 HYPERLIPIDEMIA, UNSPECIFIED HYPERLIPIDEMIA TYPE: ICD-10-CM

## 2022-10-19 DIAGNOSIS — Z12.5 SCREENING FOR PROSTATE CANCER: ICD-10-CM

## 2022-10-19 LAB
ALBUMIN SERPL BCP-MCNC: 4.3 G/DL (ref 3.5–5)
ALP SERPL-CCNC: 70 U/L (ref 46–116)
ALT SERPL W P-5'-P-CCNC: 39 U/L (ref 12–78)
ANION GAP SERPL CALCULATED.3IONS-SCNC: 3 MMOL/L (ref 4–13)
AST SERPL W P-5'-P-CCNC: 26 U/L (ref 5–45)
BILIRUB SERPL-MCNC: 0.71 MG/DL (ref 0.2–1)
BUN SERPL-MCNC: 16 MG/DL (ref 5–25)
CALCIUM SERPL-MCNC: 9.1 MG/DL (ref 8.3–10.1)
CHLORIDE SERPL-SCNC: 109 MMOL/L (ref 96–108)
CHOLEST SERPL-MCNC: 207 MG/DL
CO2 SERPL-SCNC: 26 MMOL/L (ref 21–32)
CREAT SERPL-MCNC: 1.04 MG/DL (ref 0.6–1.3)
ERYTHROCYTE [DISTWIDTH] IN BLOOD BY AUTOMATED COUNT: 12.6 % (ref 11.6–15.1)
EST. AVERAGE GLUCOSE BLD GHB EST-MCNC: 108 MG/DL
GFR SERPL CREATININE-BSD FRML MDRD: 81 ML/MIN/1.73SQ M
GLUCOSE P FAST SERPL-MCNC: 94 MG/DL (ref 65–99)
HBA1C MFR BLD: 5.4 %
HCT VFR BLD AUTO: 44.2 % (ref 36.5–49.3)
HDLC SERPL-MCNC: 42 MG/DL
HGB BLD-MCNC: 14.8 G/DL (ref 12–17)
LDLC SERPL CALC-MCNC: 146 MG/DL (ref 0–100)
MCH RBC QN AUTO: 31.3 PG (ref 26.8–34.3)
MCHC RBC AUTO-ENTMCNC: 33.5 G/DL (ref 31.4–37.4)
MCV RBC AUTO: 93 FL (ref 82–98)
PLATELET # BLD AUTO: 270 THOUSANDS/UL (ref 149–390)
PMV BLD AUTO: 10.4 FL (ref 8.9–12.7)
POTASSIUM SERPL-SCNC: 4.2 MMOL/L (ref 3.5–5.3)
PROT SERPL-MCNC: 7.1 G/DL (ref 6.4–8.4)
PSA SERPL-MCNC: 1 NG/ML (ref 0–4)
RBC # BLD AUTO: 4.73 MILLION/UL (ref 3.88–5.62)
SODIUM SERPL-SCNC: 138 MMOL/L (ref 135–147)
TRIGL SERPL-MCNC: 96 MG/DL
TSH SERPL DL<=0.05 MIU/L-ACNC: 1.32 UIU/ML (ref 0.45–4.5)
WBC # BLD AUTO: 5.78 THOUSAND/UL (ref 4.31–10.16)

## 2022-10-19 PROCEDURE — 83036 HEMOGLOBIN GLYCOSYLATED A1C: CPT

## 2022-10-19 PROCEDURE — 85027 COMPLETE CBC AUTOMATED: CPT

## 2022-10-19 PROCEDURE — 80053 COMPREHEN METABOLIC PANEL: CPT

## 2022-10-19 PROCEDURE — 80061 LIPID PANEL: CPT

## 2022-10-19 PROCEDURE — G0103 PSA SCREENING: HCPCS

## 2022-10-19 PROCEDURE — 84443 ASSAY THYROID STIM HORMONE: CPT

## 2022-10-19 PROCEDURE — 36415 COLL VENOUS BLD VENIPUNCTURE: CPT

## 2023-01-04 ENCOUNTER — TELEMEDICINE (OUTPATIENT)
Dept: FAMILY MEDICINE CLINIC | Facility: CLINIC | Age: 53
End: 2023-01-04

## 2023-01-04 DIAGNOSIS — U07.1 COVID-19: Primary | ICD-10-CM

## 2023-01-04 RX ORDER — ALBUTEROL SULFATE 90 UG/1
2 AEROSOL, METERED RESPIRATORY (INHALATION) EVERY 6 HOURS PRN
Qty: 18 G | Refills: 0 | Status: SHIPPED | OUTPATIENT
Start: 2023-01-04

## 2023-01-04 RX ORDER — NIRMATRELVIR AND RITONAVIR 300-100 MG
3 KIT ORAL 2 TIMES DAILY
Qty: 30 TABLET | Refills: 0 | Status: SHIPPED | OUTPATIENT
Start: 2023-01-04 | End: 2023-01-09

## 2023-01-04 RX ORDER — METHYLPREDNISOLONE 4 MG/1
TABLET ORAL
Qty: 21 EACH | Refills: 0 | Status: SHIPPED | OUTPATIENT
Start: 2023-01-04

## 2023-01-04 NOTE — PROGRESS NOTES
COVID-19 Outpatient Progress Note    Assessment/Plan:    Problem List Items Addressed This Visit    None  Visit Diagnoses     COVID-19    -  Primary    Relevant Medications    nirmatrelvir & ritonavir (Paxlovid, 300/100,) tablet therapy pack    albuterol (Ventolin HFA) 90 mcg/act inhaler    methylPREDNISolone 4 MG tablet therapy pack         Disposition:     Patient has asymptomatic or mild COVID-19 infection  Based off CDC guidelines, they were recommended to isolate for 5 days  If they are asymptomatic or symptoms are improving with no fevers in the past 24 hours, isolation may be ended followed by 5 days of wearing a mask when around othes to minimize risk of infecting others  If still have a fever or other symptoms have not improved, continue to isolate until they improve  Regardless of when they end isolation, avoid being around people who are more likely to get very sick from COVID-19 until at least day 11  If COVID symptoms worsen after ending isolation, restart isolation at day 0  Discussed symptom directed medication options with patient  Patient meets criteria for PAXLOVID and they have been counseled appropriately according to EUA documentation released by the FDA  After discussion, patient agrees to treatment  [address] is an investigational medicine used to treat mild-to-moderate COVID-19 in adults and children (15years of age and older weighing at least 80 pounds (40 kg)) with positive results of direct SARS-CoV-2 viral testing, and who are at high risk for progression to severe COVID-19, including hospitalization or death  PAXLOVID is investigational because it is still being studied  There is limited information about the safety and effectiveness of using PAXLOVID to treat people with mild-to-moderate COVID-19      The FDA has authorized the emergency use of PAXLOVID for the treatment of mild-tomoderate COVID-19 in adults and children (15years of age and older weighing at least 80 pounds (40 kg)) with a positive test for the virus that causes COVID-19, and who are at high risk for progression to severe COVID-19, including hospitalization or death, under an EUA  What should I tell my healthcare provider before I take PAXLOVID? Tell your healthcare provider if you:  - Have any allergies  - Have liver or kidney disease  - Are pregnant or plan to become pregnant  - Are breastfeeding a child  - Have any serious illnesses    Tell your healthcare provider about all the medicines you take, including prescription and over-the-counter medicines, vitamins, and herbal supplements  Some medicines may interact with PAXLOVID and may cause serious side effects  Keep a list of your medicines to show your healthcare provider and pharmacist when you get a new medicine  You can ask your healthcare provider or pharmacist for a list of medicines that interact with PAXLOVID  Do not start taking a new medicine without telling your healthcare provider  Your healthcare provider can tell you if it is safe to take PAXLOVID with other medicines  Tell your healthcare provider if you are taking combined hormonal contraceptive  PAXLOVID may affect how your birth control pills work  Females who are able to become pregnant should use another effective alternative form of contraception or an additional barrier method of contraception  Talk to your healthcare provider if you have any questions about contraceptive methods that might be right for you  How do I take PAXLOVID? PAXLOVID consists of 2 medicines: nirmatrelvir and ritonavir  - Take 2 pink tablets of nirmatrelvir with 1 white tablet of ritonavir by mouth 2 times each day (in the morning and in the evening) for 5 days  For each dose, take all 3 tablets at the same time  - If you have kidney disease, talk to your healthcare provider  You may need a different dose  - Swallow the tablets whole   Do not chew, break, or crush the tablets  - Take PAXLOVID with or without food  - Do not stop taking PAXLOVID without talking to your healthcare provider, even if you feel better  - If you miss a dose of PAXLOVID within 8 hours of the time it is usually taken, take it as soon as you remember  If you miss a dose by more than 8 hours, skip the missed dose and take the next dose at your regular time  Do not take 2 doses of PAXLOVID at the same time  - If you take too much PAXLOVID, call your healthcare provider or go to the nearest hospital emergency room right away  - If you are taking a ritonavir- or cobicistat-containing medicine to treat hepatitis C or Human Immunodeficiency Virus (HIV), you should continue to take your medicine as prescribed by your healthcare provider   - Talk to your healthcare provider if you do not feel better or if you feel worse after 5 days  Who should generally not take PAXLOVID? Do not take PAXLOVID if:  You are allergic to nirmatrelvir, ritonavir, or any of the ingredients in PAXLOVID  You are taking any of the following medicines:  - Alfuzosin  - Pethidine, piroxicam, propoxyphene  - Ranolazine  - Amiodarone, dronedarone, flecainide, propafenone, quinidine  - Colchicine  - Lurasidone, pimozide, clozapine  - Dihydroergotamine, ergotamine, methylergonovine  - Lovastatin, simvastatin  - Sildenafil (Revatio®) for pulmonary arterial hypertension (PAH)  - Triazolam, oral midazolam  - Apalutamide  - Carbamazepine, phenobarbital, phenytoin  - Rifampin  - St  Angelo’s Wort (hypericum perforatum)    What are the important possible side effects of PAXLOVID? Possible side effects of PAXLOVID are:  - Liver Problems  Tell your healthcare provider right away if you have any of these signs and symptoms of liver problems: loss of appetite, yellowing of your skin and the whites of eyes (jaundice), dark-colored urine, pale colored stools and itchy skin, stomach area (abdominal) pain  - Resistance to HIV Medicines   If you have untreated HIV infection, Radhika Sanford may lead to some HIV medicines not working as well in the future  - Other possible side effects include: altered sense of taste, diarrhea, high blood pressure, or muscle aches    These are not all the possible side effects of PAXLOVID  Not many people have taken PAXLOVID  Serious and unexpected side effects may happen  Radhika Sanford is still being studied, so it is possible that all of the risks are not known at this time  What other treatment choices are there? Like Robby Kumari may allow for the emergency use of other medicines to treat people with COVID-19  Go to https://Piethis.com/ for information on the emergency use of other medicines that are authorized by FDA to treat people with COVID-19  Your healthcare provider may talk with you about clinical trials for which you may be eligible  It is your choice to be treated or not to be treated with PAXLOVID  Should you decide not to receive it or for your child not to receive it, it will not change your standard medical care  What if I am pregnant or breastfeeding? There is no experience treating pregnant women or breastfeeding mothers with PAXLOVID  For a mother and unborn baby, the benefit of taking PAXLOVID may be greater than the risk from the treatment  If you are pregnant, discuss your options and specific situation with your healthcare provider  It is recommended that you use effective barrier contraception or do not have sexual activity while taking PAXLOVID  If you are breastfeeding, discuss your options and specific situation with your healthcare provider  How do I report side effects with PAXLOVID? Contact your healthcare provider if you have any side effects that bother you or do not go away      Report side effects to FDA MedWatch at www fda gov/medwatch or call 4-368-TPT2252 or you can report side effects to [employer]  at the contact information provided below  Website Fax number Telephone number   www true[x] Media 5-343-503-499-325-3355 5-508.366.1901     How should I store Kimberley Silva? Store PAXLOVID tablets at room temperature between 68°F to 77°F (20°C to 25°C)  Full fact sheet for patients, parents, and caregivers can be found at: [email or web address]    I have spent 15 minutes directly with the patient  Greater than 50% of this time was spent in counseling/coordination of care regarding: diagnostic results, prognosis, risks and benefits of treatment options, instructions for management, patient and family education, importance of treatment compliance and impressions  Encounter provider: Elmer Preciado DO     Provider located at: [address] [facility] [address]  [address] [facility] [address]  [address] [place] [address]  [phone]     Recent Visits  No visits were found meeting these conditions  Showing recent visits within past 7 days and meeting all other requirements  Today's Visits  Date Type Provider Dept   01/04/23 Telemedicine Elmer Preciado DO [facility]   Showing today's visits and meeting all other requirements  Future Appointments  No visits were found meeting these conditions  Showing future appointments within next 150 days and meeting all other requirements     This virtual check-in was done via [address] and patient was informed that this is a secure, HIPAA-compliant platform  He agrees to proceed  Patient agrees to participate in a virtual check in via telephone or video visit instead of presenting to the office to address urgent/immediate medical needs  Patient is aware this is a billable service  He acknowledged consent and understanding of privacy and security of the video platform  The patient has agreed to participate and understands they can discontinue the visit at any time      After connecting through [place], the patient was identified by name and date of birth  Lokesh Demarco was informed that this was a telemedicine visit and that the exam was being conducted confidentially over secure lines  My office door was closed  No one else was in the room  Lokesh Demarco acknowledged consent and understanding of privacy and security of the telemedicine visit  I informed the patient that I have reviewed his record in Epic and presented the opportunity for him to ask any questions regarding the visit today  The patient agreed to participate  Verification of patient location:  Patient is located in the following state in which I hold an active license: [place]    Subjective:   Lokesh Demarco is a 45 y o  male who has been screened for COVID-19  Symptom change since last report: unchanged  Patient's symptoms include shortness of breath, chest tightness and myalgias  - Date of symptom onset: 1/1/2023  - Date of positive COVID-19 test: 1/3/2023  Type of test: Home antigen  COVID-19 vaccination status: Fully vaccinated with booster    Alan Salcido has been staying home and has isolated themselves in his home  He is taking care to not share personal items and is cleaning all surfaces that are touched often, like counters, tabletops, and doorknobs using household cleaning sprays or wipes  He is wearing a mask when he leaves his room  No results found for: Anny Luong, SARSCORONAVI, CORONAVIRUSR, SARSCOVAG, [facility]    Review of Systems   Respiratory: Positive for chest tightness and shortness of breath  Musculoskeletal: Positive for myalgias       Current Outpatient Medications on File Prior to Visit   Medication Sig   • cetirizine (ZyrTEC) 10 mg tablet Take 10 mg by mouth as needed     • clindamycin (CLEOCIN T) 1 % external solution Apply topically 2 (two) times a day   • indomethacin (INDOCIN) 25 mg capsule Take 1 capsule (25 mg total) by mouth 3 (three) times a day with meals   • ketoconazole (NIZORAL) 2 % shampoo Apply 1 application topically once for 1 dose   • lisinopril (ZESTRIL) 10 mg tablet Take 1 tablet (10 mg total) by mouth daily   • lisinopril (ZESTRIL) 40 mg tablet    • methocarbamol (ROBAXIN) 750 mg tablet Take 1 tablet (750 mg total) by mouth every 8 (eight) hours   • Multiple Vitamin (MULTIVITAMIN) capsule Take 1 capsule by mouth daily   • mupirocin (BACTROBAN) 2 % ointment Apply topically 3 (three) times a day   • NIFEdipine (PROCARDIA XL) 60 mg 24 hr tablet Take 60 mg by mouth daily   • omeprazole (PriLOSEC) 20 mg delayed release capsule Take 1 capsule (20 mg total) by mouth daily   • pregabalin (LYRICA) 50 mg capsule Take 1 capsule (50 mg total) by mouth every morning AND 2 capsules (100 mg total) daily at bedtime  • Probiotic Product (PROBIOTIC ADVANCED PO) Take by mouth daily       Objective: There were no vitals taken for this visit  Physical Exam  Constitutional:       General: He is not in acute distress  Appearance: He is well-developed  He is ill-appearing  He is not diaphoretic  HENT:      Head: Atraumatic  Eyes:      Pupils: Pupils are equal, round, and reactive to light  Pulmonary:      Effort: Pulmonary effort is normal  No respiratory distress  Musculoskeletal:      Cervical back: Normal range of motion  Skin:     Coloration: Skin is not pale  Findings: No rash  Psychiatric:         Behavior: Behavior normal          Thought Content:  Thought content normal          Judgment: Judgment normal        Hawk Sergeant, DO

## 2023-03-20 DIAGNOSIS — R51.9 CHRONIC DAILY HEADACHE: ICD-10-CM

## 2023-03-20 DIAGNOSIS — M54.12 CERVICAL RADICULOPATHY: ICD-10-CM

## 2023-03-21 RX ORDER — PREGABALIN 50 MG/1
CAPSULE ORAL
Qty: 90 CAPSULE | Refills: 0 | Status: SHIPPED | OUTPATIENT
Start: 2023-03-21 | End: 2023-04-20

## 2023-03-30 ENCOUNTER — APPOINTMENT (OUTPATIENT)
Dept: LAB | Facility: CLINIC | Age: 53
End: 2023-03-30

## 2023-03-30 DIAGNOSIS — I25.10 MILD CORONARY ARTERY DISEASE: ICD-10-CM

## 2023-03-30 DIAGNOSIS — I10 ESSENTIAL HYPERTENSION: ICD-10-CM

## 2023-03-30 DIAGNOSIS — Z13.1 SCREENING FOR DIABETES MELLITUS: ICD-10-CM

## 2023-03-30 DIAGNOSIS — E78.5 HYPERLIPIDEMIA, UNSPECIFIED HYPERLIPIDEMIA TYPE: ICD-10-CM

## 2023-03-30 DIAGNOSIS — Z13.29 SCREENING FOR THYROID DISORDER: ICD-10-CM

## 2023-03-30 LAB
ALBUMIN SERPL BCP-MCNC: 4.2 G/DL (ref 3.5–5)
ALP SERPL-CCNC: 60 U/L (ref 46–116)
ALT SERPL W P-5'-P-CCNC: 35 U/L (ref 12–78)
ANION GAP SERPL CALCULATED.3IONS-SCNC: 3 MMOL/L (ref 4–13)
AST SERPL W P-5'-P-CCNC: 27 U/L (ref 5–45)
BILIRUB SERPL-MCNC: 0.73 MG/DL (ref 0.2–1)
BUN SERPL-MCNC: 16 MG/DL (ref 5–25)
CALCIUM SERPL-MCNC: 9.1 MG/DL (ref 8.3–10.1)
CHLORIDE SERPL-SCNC: 110 MMOL/L (ref 96–108)
CHOLEST SERPL-MCNC: 214 MG/DL
CO2 SERPL-SCNC: 26 MMOL/L (ref 21–32)
CREAT SERPL-MCNC: 1.01 MG/DL (ref 0.6–1.3)
GFR SERPL CREATININE-BSD FRML MDRD: 83 ML/MIN/1.73SQ M
GLUCOSE P FAST SERPL-MCNC: 85 MG/DL (ref 65–99)
HDLC SERPL-MCNC: 47 MG/DL
LDLC SERPL CALC-MCNC: 151 MG/DL (ref 0–100)
POTASSIUM SERPL-SCNC: 4 MMOL/L (ref 3.5–5.3)
PROT SERPL-MCNC: 7.2 G/DL (ref 6.4–8.4)
SODIUM SERPL-SCNC: 139 MMOL/L (ref 135–147)
TRIGL SERPL-MCNC: 80 MG/DL
TSH SERPL DL<=0.05 MIU/L-ACNC: 1.02 UIU/ML (ref 0.45–4.5)

## 2023-04-01 LAB
EST. AVERAGE GLUCOSE BLD GHB EST-MCNC: 103 MG/DL
HBA1C MFR BLD: 5.2 %

## 2023-10-23 ENCOUNTER — OFFICE VISIT (OUTPATIENT)
Dept: FAMILY MEDICINE CLINIC | Facility: CLINIC | Age: 53
End: 2023-10-23
Payer: COMMERCIAL

## 2023-10-23 VITALS
TEMPERATURE: 97.7 F | HEIGHT: 69 IN | DIASTOLIC BLOOD PRESSURE: 74 MMHG | OXYGEN SATURATION: 97 % | BODY MASS INDEX: 32.73 KG/M2 | WEIGHT: 221 LBS | SYSTOLIC BLOOD PRESSURE: 120 MMHG | HEART RATE: 74 BPM

## 2023-10-23 DIAGNOSIS — R51.9 CHRONIC DAILY HEADACHE: ICD-10-CM

## 2023-10-23 DIAGNOSIS — I25.10 MILD CORONARY ARTERY DISEASE: ICD-10-CM

## 2023-10-23 DIAGNOSIS — Z13.0 SCREENING FOR IRON DEFICIENCY ANEMIA: ICD-10-CM

## 2023-10-23 DIAGNOSIS — Z13.1 SCREENING FOR DIABETES MELLITUS: ICD-10-CM

## 2023-10-23 DIAGNOSIS — Z00.00 ANNUAL PHYSICAL EXAM: ICD-10-CM

## 2023-10-23 DIAGNOSIS — M25.50 ARTHRALGIA, UNSPECIFIED JOINT: ICD-10-CM

## 2023-10-23 DIAGNOSIS — N52.9 ERECTILE DYSFUNCTION, UNSPECIFIED ERECTILE DYSFUNCTION TYPE: ICD-10-CM

## 2023-10-23 DIAGNOSIS — Z13.220 SCREENING FOR CHOLESTEROL LEVEL: ICD-10-CM

## 2023-10-23 DIAGNOSIS — E66.9 OBESITY (BMI 30.0-34.9): ICD-10-CM

## 2023-10-23 DIAGNOSIS — Z13.29 SCREENING FOR THYROID DISORDER: ICD-10-CM

## 2023-10-23 DIAGNOSIS — I10 ESSENTIAL HYPERTENSION: ICD-10-CM

## 2023-10-23 DIAGNOSIS — E78.5 HYPERLIPIDEMIA, UNSPECIFIED HYPERLIPIDEMIA TYPE: ICD-10-CM

## 2023-10-23 DIAGNOSIS — M54.12 CERVICAL RADICULOPATHY: ICD-10-CM

## 2023-10-23 DIAGNOSIS — Z23 ENCOUNTER FOR IMMUNIZATION: Primary | ICD-10-CM

## 2023-10-23 DIAGNOSIS — M79.18 MYOFASCIAL PAIN SYNDROME: ICD-10-CM

## 2023-10-23 DIAGNOSIS — M79.18 CERVICAL MYOFASCIAL PAIN SYNDROME: ICD-10-CM

## 2023-10-23 PROCEDURE — 90686 IIV4 VACC NO PRSV 0.5 ML IM: CPT

## 2023-10-23 PROCEDURE — 99214 OFFICE O/P EST MOD 30 MIN: CPT | Performed by: FAMILY MEDICINE

## 2023-10-23 PROCEDURE — 99396 PREV VISIT EST AGE 40-64: CPT | Performed by: FAMILY MEDICINE

## 2023-10-23 PROCEDURE — 90471 IMMUNIZATION ADMIN: CPT

## 2023-10-23 RX ORDER — PREGABALIN 50 MG/1
CAPSULE ORAL
Qty: 90 CAPSULE | Refills: 0 | Status: SHIPPED | OUTPATIENT
Start: 2023-10-23 | End: 2024-05-26

## 2023-10-23 RX ORDER — METHOCARBAMOL 750 MG/1
750 TABLET, FILM COATED ORAL EVERY 8 HOURS SCHEDULED
Qty: 90 TABLET | Refills: 0 | Status: SHIPPED | OUTPATIENT
Start: 2023-10-23
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
## 2023-10-23 NOTE — PATIENT INSTRUCTIONS
Wellness Visit for Adults   AMBULATORY CARE:   A wellness visit  is when you see your healthcare provider to get screened for health problems. Your healthcare provider will also give you advice on how to stay healthy. Write down your questions so you remember to ask them. Ask your healthcare provider how often you should have a wellness visit. What happens at a wellness visit:  Your healthcare provider will ask about your health, and your family history of health problems. This includes high blood pressure, heart disease, and cancer. He or she will ask if you have symptoms that concern you, if you smoke, and about your mood. You may also be asked about your intake of medicines, supplements, food, and alcohol. Any of the following may be done: Your weight  will be checked. Your height may also be checked so your body mass index (BMI) can be calculated. Your BMI shows if you are at a healthy weight. Your blood pressure  and heart rate will be checked. Your temperature may also be checked. Blood and urine tests  may be done. Blood tests may be done to check your cholesterol levels. Abnormal cholesterol levels increase your risk for heart disease and stroke. You may also need a blood or urine test to check for diabetes if you are at increased risk. Urine tests may be done to look for signs of an infection or kidney disease. A physical exam  includes checking your heartbeat and lungs with a stethoscope. Your healthcare provider may also check your skin to look for sun damage. Screening tests  may be recommended. A screening test is done to check for diseases that may not cause symptoms. The screening tests you may need depend on your age, gender, family history, and lifestyle habits. For example, colorectal screening may be recommended if you are 48years old or older. Screening tests you need if you are a woman:   A Pap smear  is used to screen for cervical cancer.  Pap smears are usually done every 3 to 5 years depending on your age. You may need them more often if you have had abnormal Pap smear test results in the past. Ask your healthcare provider how often you should have a Pap smear. A mammogram  is an x-ray of your breasts to screen for breast cancer. Experts recommend mammograms every 2 years starting at age 48 years. You may need a mammogram at age 52 years or younger if you have an increased risk for breast cancer. Talk to your healthcare provider about when you should start having mammograms and how often you need them. Vaccines you may need:   Get an influenza vaccine  every year. The influenza vaccine protects you from the flu. Several types of viruses cause the flu. The viruses change over time, so new vaccines are made each year. Get a tetanus-diphtheria (Td) booster vaccine  every 10 years. This vaccine protects you against tetanus and diphtheria. Tetanus is a severe infection that may cause painful muscle spasms and lockjaw. Diphtheria is a severe bacterial infection that causes a thick covering in the back of your mouth and throat. Get a human papillomavirus (HPV) vaccine  if you are female and aged 23 to 32 or male 23 to 24 and never received it. This vaccine protects you from HPV infection. HPV is the most common infection spread by sexual contact. HPV may also cause vaginal, penile, and anal cancers. Get a pneumococcal vaccine  if you are aged 72 years or older. The pneumococcal vaccine is an injection given to protect you from pneumococcal disease. Pneumococcal disease is an infection caused by pneumococcal bacteria. The infection may cause pneumonia, meningitis, or an ear infection. Get a shingles vaccine  if you are 60 or older, even if you have had shingles before. The shingles vaccine is an injection to protect you from the varicella-zoster virus. This is the same virus that causes chickenpox.  Shingles is a painful rash that develops in people who had chickenpox or have been exposed to the virus. How to eat healthy:  My Plate is a model for planning healthy meals. It shows the types and amounts of foods that should go on your plate. Fruits and vegetables make up about half of your plate, and grains and protein make up the other half. A serving of dairy is included on the side of your plate. The amount of calories and serving sizes you need depends on your age, gender, weight, and height. Examples of healthy foods are listed below:  Eat a variety of vegetables  such as dark green, red, and orange vegetables. You can also include canned vegetables low in sodium (salt) and frozen vegetables without added butter or sauces. Eat a variety of fresh fruits , canned fruit in 100% juice, frozen fruit, and dried fruit. Include whole grains. At least half of the grains you eat should be whole grains. Examples include whole-wheat bread, wheat pasta, brown rice, and whole-grain cereals such as oatmeal.    Eat a variety of protein foods such as seafood (fish and shellfish), lean meat, and poultry without skin (turkey and chicken). Examples of lean meats include pork leg, shoulder, or tenderloin, and beef round, sirloin, tenderloin, and extra lean ground beef. Other protein foods include eggs and egg substitutes, beans, peas, soy products, nuts, and seeds. Choose low-fat dairy products such as skim or 1% milk or low-fat yogurt, cheese, and cottage cheese. Limit unhealthy fats  such as butter, hard margarine, and shortening. Exercise:  Exercise at least 30 minutes per day on most days of the week. Some examples of exercise include walking, biking, dancing, and swimming. You can also fit in more physical activity by taking the stairs instead of the elevator or parking farther away from stores. Include muscle strengthening activities 2 days each week. Regular exercise provides many health benefits.  It helps you manage your weight, and decreases your risk for type 2 diabetes, heart disease, stroke, and high blood pressure. Exercise can also help improve your mood. Ask your healthcare provider about the best exercise plan for you. General health and safety guidelines:   Do not smoke. Nicotine and other chemicals in cigarettes and cigars can cause lung damage. Ask your healthcare provider for information if you currently smoke and need help to quit. E-cigarettes or smokeless tobacco still contain nicotine. Talk to your healthcare provider before you use these products. Limit alcohol. A drink of alcohol is 12 ounces of beer, 5 ounces of wine, or 1½ ounces of liquor. Lose weight, if needed. Being overweight increases your risk of certain health conditions. These include heart disease, high blood pressure, type 2 diabetes, and certain types of cancer. Protect your skin. Do not sunbathe or use tanning beds. Use sunscreen with a SPF 15 or higher. Apply sunscreen at least 15 minutes before you go outside. Reapply sunscreen every 2 hours. Wear protective clothing, hats, and sunglasses when you are outside. Drive safely. Always wear your seatbelt. Make sure everyone in your car wears a seatbelt. A seatbelt can save your life if you are in an accident. Do not use your cell phone when you are driving. This could distract you and cause an accident. Pull over if you need to make a call or send a text message. Practice safe sex. Use latex condoms if are sexually active and have more than one partner. Your healthcare provider may recommend screening tests for sexually transmitted infections (STIs). Wear helmets, lifejackets, and protective gear. Always wear a helmet when you ride a bike or motorcycle, go skiing, or play sports that could cause a head injury. Wear protective equipment when you play sports. Wear a lifejacket when you are on a boat or doing water sports.     © Copyright [employer] 2023 Information is for End User's use only and may not be sold, redistributed or otherwise used for commercial purposes. The above information is an  only. It is not intended as medical advice for individual conditions or treatments. Talk to your doctor, nurse or pharmacist before following any medical regimen to see if it is safe and effective for you.

## 2023-10-23 NOTE — PROGRESS NOTES
Assessment/Plan:   1. Essential hypertension  Blood pressure appears very well controlled today. Continue with routine home monitoring as well as current treatment with lisinopril. 2. Cervical radiculopathy/Myofascial pain syndrome  Patient following with physical therapy regularly. At this time, continue with his current treatment. Continues well with his current treatment of Lyrica. We will check blood work to further assess for any secondary causes of his symptoms. - pregabalin (LYRICA) 50 mg capsule; Take 1 capsule (50 mg total) by mouth every morning AND 2 capsules (100 mg total) daily at bedtime. Dispense: 90 capsule; Refill: 0  - methocarbamol (ROBAXIN) 750 mg tablet; Take 1 tablet (750 mg total) by mouth every 8 (eight) hours  Dispense: 90 tablet; Refill: 0  - DAMEON Screen w/ Reflex to Titer/Pattern; Future  - RF Screen w/ Reflex to Titer; Future  - Cyclic citrul peptide antibody, IgG; Future  - C-reactive protein; Future    3. BMI 32.0-32.9,adult  Reviewed patient's excessive weight. At this time, I he has been on multiple supplements/medication in the past.  He also has been maintaining a very strict diet and exercise plan. He still has been having persistent his weight gain. Reviewed medical treatment options. He was advised that he may benefit from taking Wegovy. Prescription was sent to his pharmacy. Follow-up with patient in 2 to 3 months for a weight check. - Semaglutide-Weight Management (WEGOVY) 0.25 MG/0.5ML; Inject 0.5 mL (0.25 mg total) under the skin once a week for 28 days  Dispense: 2 mL; Refill: 0  - Semaglutide-Weight Management (WEGOVY) 0.5 MG/0.5ML; Inject 0.5 mL (0.5 mg total) under the skin once a week for 28 days Do not start before November 18, 2023. Dispense: 2 mL; Refill: 0  - Semaglutide-Weight Management (WEGOVY) 1 MG/0.5ML; Inject 0.5 mL (1 mg total) under the skin once a week for 28 days Do not start before December 16, 2023.   Dispense: 2 mL; Refill: 0  - Semaglutide-Weight Management (WEGOVY) 1.7 MG/0.75ML; Inject 0.75 mL (1.7 mg total) under the skin once a week for 28 days Do not start before January 13, 2024. Dispense: 3 mL; Refill: 0  - Semaglutide-Weight Management (WEGOVY) 2.4 MG/0.75ML; Inject 0.75 mL (2.4 mg total) under the skin once a week for 28 days Do not start before February 10, 2024. Dispense: 3 mL; Refill: 0    4. Erectile dysfunction, unspecified erectile dysfunction type  - Testosterone, free, total; Future  - Ambulatory Referral to Urology; Future    5. Encounter for immunization  - influenza vaccine, quadrivalent, 0.5 mL, preservative-free, for adult and pediatric patients 6 mos+ (AFLURIA, FLUARIX, FLULAVAL, FLUZONE)      Depression Screening and Follow-up Plan: Patient was screened for depression during today's encounter. They screened negative with a PHQ-2 score of 0. Diagnoses and all orders for this visit:    Encounter for immunization  -     influenza vaccine, quadrivalent, 0.5 mL, preservative-free, for adult and pediatric patients 6 mos+ (AFLURIA, FLUARIX, FLULAVAL, FLUZONE)    Annual physical exam    BMI 32.0-32.9,adult    Chronic daily headache  -     pregabalin (LYRICA) 50 mg capsule; Take 1 capsule (50 mg total) by mouth every morning AND 2 capsules (100 mg total) daily at bedtime. Cervical radiculopathy  -     pregabalin (LYRICA) 50 mg capsule; Take 1 capsule (50 mg total) by mouth every morning AND 2 capsules (100 mg total) daily at bedtime.  -     methocarbamol (ROBAXIN) 750 mg tablet; Take 1 tablet (750 mg total) by mouth every 8 (eight) hours  -     DAMEON Screen w/ Reflex to Titer/Pattern; Future  -     RF Screen w/ Reflex to Titer; Future  -     Cyclic citrul peptide antibody, IgG; Future  -     C-reactive protein; Future    Myofascial pain syndrome  -     methocarbamol (ROBAXIN) 750 mg tablet; Take 1 tablet (750 mg total) by mouth every 8 (eight) hours  -     DAMEON Screen w/ Reflex to Titer/Pattern;  Future  -     RF Screen w/ Reflex to Titer; Future  -     Cyclic citrul peptide antibody, IgG; Future  -     C-reactive protein; Future    Mild coronary artery disease    Essential hypertension    Cervical myofascial pain syndrome    Hyperlipidemia, unspecified hyperlipidemia type    Erectile dysfunction, unspecified erectile dysfunction type  -     Testosterone, free, total; Future  -     Ambulatory Referral to Urology; Future    Screening for iron deficiency anemia  -     CBC; Future    Screening for diabetes mellitus  -     Comprehensive metabolic panel; Future  -     Hemoglobin A1C; Future  -     Magnesium; Future    Screening for cholesterol level  -     Lipid Panel with Direct LDL reflex; Future    Screening for thyroid disorder  -     TSH, 3rd generation with Free T4 reflex; Future    Arthralgia, unspecified joint    Obesity (BMI 30.0-34.9)  -     Semaglutide-Weight Management (WEGOVY) 0.25 MG/0.5ML; Inject 0.5 mL (0.25 mg total) under the skin once a week for 28 days  -     Semaglutide-Weight Management (WEGOVY) 0.5 MG/0.5ML; Inject 0.5 mL (0.5 mg total) under the skin once a week for 28 days Do not start before November 18, 2023. -     Semaglutide-Weight Management (WEGOVY) 1 MG/0.5ML; Inject 0.5 mL (1 mg total) under the skin once a week for 28 days Do not start before December 16, 2023. -     Semaglutide-Weight Management (WEGOVY) 1.7 MG/0.75ML; Inject 0.75 mL (1.7 mg total) under the skin once a week for 28 days Do not start before January 13, 2024. -     Semaglutide-Weight Management (WEGOVY) 2.4 MG/0.75ML; Inject 0.75 mL (2.4 mg total) under the skin once a week for 28 days Do not start before February 10, 2024. Subjective:       Chief Complaint   Patient presents with    Physical Exam      Patient ID: Fernando Calix is a 52 y.o. male presents today for a follow-up on his chronic conditions. He has hypertension, cervical radiculopathy, myofascial fascial pain syndrome, mild CAD, dyslipidemia.   He is been taking his medications regularly. He denies adverse reactions with his medications. He did try taking his weight loss supplements however has not found any benefit from this. He has been working on a very strict diet and exercise plan. He still has elevated weight today    HPI    Review of Systems   Constitutional:  Negative for activity change, chills, fatigue and fever. HENT:  Negative for congestion, ear pain, sinus pressure and sore throat. Eyes:  Negative for redness, itching and visual disturbance. Respiratory:  Negative for cough and shortness of breath. Cardiovascular:  Negative for chest pain and palpitations. Gastrointestinal:  Negative for abdominal pain, diarrhea and nausea. Endocrine: Negative for cold intolerance and heat intolerance. Genitourinary:  Negative for dysuria, flank pain and frequency. Musculoskeletal:  Negative for arthralgias, back pain, gait problem and myalgias. Skin:  Negative for color change. Allergic/Immunologic: Negative for environmental allergies. Neurological:  Negative for dizziness, numbness and headaches. Psychiatric/Behavioral:  Negative for behavioral problems and sleep disturbance. The following portions of the patient's history were reviewed and updated as appropriate : past family history, past medical history, past social history and past surgical history.     Current Outpatient Medications:     albuterol (Ventolin HFA) 90 mcg/act inhaler, Inhale 2 puffs every 6 (six) hours as needed for wheezing or shortness of breath, Disp: 18 g, Rfl: 0    cetirizine (ZyrTEC) 10 mg tablet, Take 10 mg by mouth as needed  , Disp: , Rfl:     clindamycin (CLEOCIN T) 1 % external solution, Apply topically 2 (two) times a day, Disp: 30 mL, Rfl: 0    ketoconazole (NIZORAL) 2 % shampoo, Apply 1 application topically once for 1 dose, Disp: 120 mL, Rfl: 1    lisinopril (ZESTRIL) 10 mg tablet, Take 1 tablet (10 mg total) by mouth daily, Disp: 30 tablet, Rfl: 3    methocarbamol (ROBAXIN) 750 mg tablet, Take 1 tablet (750 mg total) by mouth every 8 (eight) hours, Disp: 90 tablet, Rfl: 0    Multiple Vitamin (MULTIVITAMIN) capsule, Take 1 capsule by mouth daily, Disp: , Rfl:     omeprazole (PriLOSEC) 20 mg delayed release capsule, Take 1 capsule (20 mg total) by mouth daily, Disp: 30 capsule, Rfl: 1    pregabalin (LYRICA) 50 mg capsule, Take 1 capsule (50 mg total) by mouth every morning AND 2 capsules (100 mg total) daily at bedtime. , Disp: 90 capsule, Rfl: 0    Probiotic Product (PROBIOTIC ADVANCED PO), Take by mouth daily, Disp: , Rfl:     Semaglutide-Weight Management (WEGOVY) 0.25 MG/0.5ML, Inject 0.5 mL (0.25 mg total) under the skin once a week for 28 days, Disp: 2 mL, Rfl: 0    [START ON 11/18/2023] Semaglutide-Weight Management (WEGOVY) 0.5 MG/0.5ML, Inject 0.5 mL (0.5 mg total) under the skin once a week for 28 days Do not start before November 18, 2023., Disp: 2 mL, Rfl: 0    [START ON 12/16/2023] Semaglutide-Weight Management (WEGOVY) 1 MG/0.5ML, Inject 0.5 mL (1 mg total) under the skin once a week for 28 days Do not start before December 16, 2023., Disp: 2 mL, Rfl: 0    [START ON 1/13/2024] Semaglutide-Weight Management (WEGOVY) 1.7 MG/0.75ML, Inject 0.75 mL (1.7 mg total) under the skin once a week for 28 days Do not start before January 13, 2024., Disp: 3 mL, Rfl: 0    [START ON 2/10/2024] Semaglutide-Weight Management (WEGOVY) 2.4 MG/0.75ML, Inject 0.75 mL (2.4 mg total) under the skin once a week for 28 days Do not start before February 10, 2024., Disp: 3 mL, Rfl: 0    indomethacin (INDOCIN) 25 mg capsule, Take 1 capsule (25 mg total) by mouth 3 (three) times a day with meals (Patient not taking: Reported on 10/23/2023), Disp: 90 capsule, Rfl: 0    lisinopril (ZESTRIL) 40 mg tablet, , Disp: , Rfl:     mupirocin (BACTROBAN) 2 % ointment, Apply topically 3 (three) times a day (Patient not taking: Reported on 10/23/2023), Disp: 22 g, Rfl: 0    NIFEdipine (PROCARDIA XL) 60 mg 24 hr tablet, Take 60 mg by mouth daily (Patient not taking: Reported on 10/23/2023), Disp: , Rfl:          Objective:         Vitals:    10/23/23 1036   BP: 120/74   BP Location: Left arm   Patient Position: Sitting   Cuff Size: Large   Pulse: 74   Temp: 97.7 °F (36.5 °C)   TempSrc: Temporal   SpO2: 97%   Weight: 100 kg (221 lb)   Height: 5' 9" (1.753 m)     Physical Exam  Vitals reviewed. Constitutional:       Appearance: He is well-developed. HENT:      Head: Normocephalic and atraumatic. Nose: Nose normal.      Mouth/Throat:      Pharynx: No oropharyngeal exudate. Eyes:      General: No scleral icterus. Right eye: No discharge. Left eye: No discharge. Pupils: Pupils are equal, round, and reactive to light. Neck:      Trachea: No tracheal deviation. Cardiovascular:      Rate and Rhythm: Normal rate and regular rhythm. Pulses:           Dorsalis pedis pulses are 2+ on the right side and 2+ on the left side. Posterior tibial pulses are 2+ on the right side and 2+ on the left side. Heart sounds: Normal heart sounds. No murmur heard. No friction rub. No gallop. Pulmonary:      Effort: Pulmonary effort is normal. No respiratory distress. Breath sounds: Normal breath sounds. No wheezing or rales. Abdominal:      General: Bowel sounds are normal. There is no distension. Palpations: Abdomen is soft. Tenderness: There is no abdominal tenderness. There is no guarding or rebound. Musculoskeletal:         General: Normal range of motion. Cervical back: Normal range of motion and neck supple. Lymphadenopathy:      Head:      Right side of head: No submental or submandibular adenopathy. Left side of head: No submental or submandibular adenopathy. Cervical: No cervical adenopathy. Right cervical: No superficial, deep or posterior cervical adenopathy.      Left cervical: No superficial, deep or posterior cervical adenopathy. Skin:     General: Skin is warm and dry. Findings: No erythema. Neurological:      Mental Status: He is alert and oriented to person, place, and time. Cranial Nerves: No cranial nerve deficit. Sensory: No sensory deficit. Psychiatric:         Mood and Affect: Mood is not anxious or depressed. Speech: Speech normal.         Behavior: Behavior normal.         Thought Content:  Thought content normal.         Judgment: Judgment normal.

## 2023-10-31 ENCOUNTER — APPOINTMENT (OUTPATIENT)
Dept: LAB | Facility: CLINIC | Age: 53
End: 2023-10-31
Payer: COMMERCIAL

## 2023-10-31 DIAGNOSIS — N52.9 ERECTILE DYSFUNCTION, UNSPECIFIED ERECTILE DYSFUNCTION TYPE: ICD-10-CM

## 2023-10-31 DIAGNOSIS — M79.18 MYOFASCIAL PAIN SYNDROME: ICD-10-CM

## 2023-10-31 DIAGNOSIS — Z13.0 SCREENING FOR IRON DEFICIENCY ANEMIA: ICD-10-CM

## 2023-10-31 DIAGNOSIS — M54.12 CERVICAL RADICULOPATHY: ICD-10-CM

## 2023-10-31 DIAGNOSIS — Z13.220 SCREENING FOR CHOLESTEROL LEVEL: ICD-10-CM

## 2023-10-31 DIAGNOSIS — Z13.29 SCREENING FOR THYROID DISORDER: ICD-10-CM

## 2023-10-31 DIAGNOSIS — Z13.1 SCREENING FOR DIABETES MELLITUS: ICD-10-CM

## 2023-10-31 LAB
ALBUMIN SERPL BCP-MCNC: 4.6 G/DL (ref 3.5–5)
ALP SERPL-CCNC: 64 U/L (ref 34–104)
ALT SERPL W P-5'-P-CCNC: 24 U/L (ref 7–52)
ANA SER QL IA: NEGATIVE
ANION GAP SERPL CALCULATED.3IONS-SCNC: 4 MMOL/L
AST SERPL W P-5'-P-CCNC: 23 U/L (ref 13–39)
BILIRUB SERPL-MCNC: 0.64 MG/DL (ref 0.2–1)
BUN SERPL-MCNC: 19 MG/DL (ref 5–25)
CALCIUM SERPL-MCNC: 9 MG/DL (ref 8.4–10.2)
CHLORIDE SERPL-SCNC: 109 MMOL/L (ref 96–108)
CHOLEST SERPL-MCNC: 204 MG/DL
CO2 SERPL-SCNC: 29 MMOL/L (ref 21–32)
CREAT SERPL-MCNC: 0.95 MG/DL (ref 0.6–1.3)
CRP SERPL QL: <1 MG/L
ERYTHROCYTE [DISTWIDTH] IN BLOOD BY AUTOMATED COUNT: 12.7 % (ref 11.6–15.1)
EST. AVERAGE GLUCOSE BLD GHB EST-MCNC: 108 MG/DL
GFR SERPL CREATININE-BSD FRML MDRD: 89 ML/MIN/1.73SQ M
GLUCOSE P FAST SERPL-MCNC: 92 MG/DL (ref 65–99)
HBA1C MFR BLD: 5.4 %
HCT VFR BLD AUTO: 43.7 % (ref 36.5–49.3)
HDLC SERPL-MCNC: 41 MG/DL
HGB BLD-MCNC: 14.7 G/DL (ref 12–17)
LDLC SERPL CALC-MCNC: 142 MG/DL (ref 0–100)
MAGNESIUM SERPL-MCNC: 2.1 MG/DL (ref 1.9–2.7)
MCH RBC QN AUTO: 31.7 PG (ref 26.8–34.3)
MCHC RBC AUTO-ENTMCNC: 33.6 G/DL (ref 31.4–37.4)
MCV RBC AUTO: 94 FL (ref 82–98)
PLATELET # BLD AUTO: 251 THOUSANDS/UL (ref 149–390)
PMV BLD AUTO: 10.2 FL (ref 8.9–12.7)
POTASSIUM SERPL-SCNC: 4.4 MMOL/L (ref 3.5–5.3)
PROT SERPL-MCNC: 6.9 G/DL (ref 6.4–8.4)
RBC # BLD AUTO: 4.64 MILLION/UL (ref 3.88–5.62)
RHEUMATOID FACT SER QL LA: NEGATIVE
SODIUM SERPL-SCNC: 142 MMOL/L (ref 135–147)
TRIGL SERPL-MCNC: 103 MG/DL
TSH SERPL DL<=0.05 MIU/L-ACNC: 1.38 UIU/ML (ref 0.45–4.5)
WBC # BLD AUTO: 5.43 THOUSAND/UL (ref 4.31–10.16)

## 2023-10-31 PROCEDURE — 86200 CCP ANTIBODY: CPT

## 2023-10-31 PROCEDURE — 84402 ASSAY OF FREE TESTOSTERONE: CPT

## 2023-10-31 PROCEDURE — 36415 COLL VENOUS BLD VENIPUNCTURE: CPT

## 2023-10-31 PROCEDURE — 84403 ASSAY OF TOTAL TESTOSTERONE: CPT

## 2023-10-31 PROCEDURE — 83036 HEMOGLOBIN GLYCOSYLATED A1C: CPT

## 2023-10-31 PROCEDURE — 86140 C-REACTIVE PROTEIN: CPT

## 2023-10-31 PROCEDURE — 84443 ASSAY THYROID STIM HORMONE: CPT

## 2023-10-31 PROCEDURE — 85027 COMPLETE CBC AUTOMATED: CPT

## 2023-10-31 PROCEDURE — 83735 ASSAY OF MAGNESIUM: CPT

## 2023-10-31 PROCEDURE — 86038 ANTINUCLEAR ANTIBODIES: CPT

## 2023-10-31 PROCEDURE — 80061 LIPID PANEL: CPT

## 2023-10-31 PROCEDURE — 80053 COMPREHEN METABOLIC PANEL: CPT

## 2023-10-31 PROCEDURE — 86430 RHEUMATOID FACTOR TEST QUAL: CPT

## 2023-11-01 LAB
TESTOST FREE SERPL-MCNC: 6.7 PG/ML (ref 7.2–24)
TESTOST SERPL-MCNC: 212 NG/DL (ref 264–916)

## 2023-11-02 DIAGNOSIS — R79.89 LOW TESTOSTERONE: Primary | ICD-10-CM

## 2023-11-03 LAB — CCP AB SER IA-ACNC: 1.7

## 2023-12-20 ENCOUNTER — OFFICE VISIT (OUTPATIENT)
Dept: FAMILY MEDICINE CLINIC | Facility: CLINIC | Age: 53
End: 2023-12-20
Payer: COMMERCIAL

## 2023-12-20 VITALS
HEART RATE: 70 BPM | OXYGEN SATURATION: 98 % | RESPIRATION RATE: 18 BRPM | TEMPERATURE: 98.1 F | WEIGHT: 225 LBS | SYSTOLIC BLOOD PRESSURE: 116 MMHG | DIASTOLIC BLOOD PRESSURE: 78 MMHG | BODY MASS INDEX: 33.23 KG/M2

## 2023-12-20 DIAGNOSIS — J01.90 ACUTE NON-RECURRENT SINUSITIS, UNSPECIFIED LOCATION: Primary | ICD-10-CM

## 2023-12-20 PROCEDURE — 99213 OFFICE O/P EST LOW 20 MIN: CPT

## 2023-12-20 RX ORDER — AMOXICILLIN AND CLAVULANATE POTASSIUM 875; 125 MG/1; MG/1
1 TABLET, FILM COATED ORAL EVERY 12 HOURS SCHEDULED
Qty: 14 TABLET | Refills: 0 | Status: SHIPPED | OUTPATIENT
Start: 2023-12-20 | End: 2023-12-27

## 2023-12-20 NOTE — ASSESSMENT & PLAN NOTE
Patient presents today with signs, symptoms and exam findings consistent with an acute sinusitis.  Patient has been doing sinus rinses at home with no relief.  Patient has not been using any nasal sprays, I recommended he begin taking Flonase over-the-counter.  Since his symptoms have been going on since the beginning of December, he is in the time period where he will most likely benefit from an antibiotic.  Sent in for a weeklong course of Augmentin with education that Augmentin can cause some nausea, diarrhea, and stomach upset.  Patient already takes a probiotic, I encouraged him to continue using it to help with the abdominal upset that Augmentin may cause.    Patient does have a history of chronic sinus infections, but has not had one in a long time.  I instructed him that if symptoms persist past the weeklong antibiotic, he should let us know as we might start doxycycline next along with a steroid pack potentially.  If symptoms do not improve past a second antibiotic, we may consider ENT evaluation.    Patient is agreeable with plan today and will let us know if he has any problems.

## 2023-12-20 NOTE — PROGRESS NOTES
Name: David Kaur      : 1970      MRN: [id number]  Encounter Provider: Vikki Pringle PA-C  Encounter Date: 2023   Encounter department: [facility]    Assessment & Plan     1. Acute non-recurrent sinusitis, unspecified location  Assessment & Plan:  Patient presents today with signs, symptoms and exam findings consistent with an acute sinusitis.  Patient has been doing sinus rinses at home with no relief.  Patient has not been using any nasal sprays, I recommended he begin taking Flonase over-the-counter.  Since his symptoms have been going on since the beginning of December, he is in the time period where he will most likely benefit from an antibiotic.  Sent in for a weeklong course of Augmentin with education that Augmentin can cause some nausea, diarrhea, and stomach upset.  Patient already takes a probiotic, I encouraged him to continue using it to help with the abdominal upset that Augmentin may cause.    Patient does have a history of chronic sinus infections, but has not had one in a long time.  I instructed him that if symptoms persist past the weeklong antibiotic, he should let us know as we might start doxycycline next along with a steroid pack potentially.  If symptoms do not improve past a second antibiotic, we may consider ENT evaluation.    Patient is agreeable with plan today and will let us know if he has any problems.    Orders:  -     amoxicillin-clavulanate (AUGMENTIN) 875-125 mg per tablet; Take 1 tablet by mouth every 12 (twelve) hours for 7 days           Subjective      Patient presents today with concerns for cold symptoms that he has been having since the beginning of December.  Patient states his right ear is closed and it has been hurting.  He also feels that he is very congested.  His right nostril is also closed and when he is doing sinus rinses he is not able to completely cleared out.  He states he does have some chills but no fevers.  No  sore throats, chest pain, or shortness of breath.  He does have a cough that is nonproductive.  He has a history of sinus infections and states this feels quite similar.  He has been doing sinus rinses with no improvement.      Review of Systems   Constitutional:  Positive for chills. Negative for fatigue and fever.   HENT:  Positive for congestion, ear pain, postnasal drip and sinus pressure. Negative for ear discharge, sore throat and tinnitus.    Respiratory:  Positive for cough. Negative for chest tightness and shortness of breath.    Cardiovascular:  Negative for chest pain, palpitations and leg swelling.   Gastrointestinal:  Negative for abdominal pain.   Genitourinary:  Negative for difficulty urinating and dysuria.   Musculoskeletal:  Negative for arthralgias and myalgias.   Skin:  Negative for color change and rash.   Neurological:  Negative for dizziness, light-headedness and headaches.   Psychiatric/Behavioral:  Negative for behavioral problems. The patient is not nervous/anxious.        Current Outpatient Medications on File Prior to Visit   Medication Sig    albuterol (Ventolin HFA) 90 mcg/act inhaler Inhale 2 puffs every 6 (six) hours as needed for wheezing or shortness of breath    cetirizine (ZyrTEC) 10 mg tablet Take 10 mg by mouth as needed      clindamycin (CLEOCIN T) 1 % external solution Apply topically 2 (two) times a day    lisinopril (ZESTRIL) 10 mg tablet Take 1 tablet (10 mg total) by mouth daily    methocarbamol (ROBAXIN) 750 mg tablet Take 1 tablet (750 mg total) by mouth every 8 (eight) hours    Multiple Vitamin (MULTIVITAMIN) capsule Take 1 capsule by mouth daily    omeprazole (PriLOSEC) 20 mg delayed release capsule Take 1 capsule (20 mg total) by mouth daily    pregabalin (LYRICA) 50 mg capsule Take 1 capsule (50 mg total) by mouth every morning AND 2 capsules (100 mg total) daily at bedtime.    Probiotic Product (PROBIOTIC ADVANCED PO) Take by mouth daily    indomethacin (INDOCIN) 25  mg capsule Take 1 capsule (25 mg total) by mouth 3 (three) times a day with meals (Patient not taking: Reported on 10/23/2023)    ketoconazole (NIZORAL) 2 % shampoo Apply 1 application topically once for 1 dose    Semaglutide-Weight Management (WEGOVY) 1 MG/0.5ML Inject 0.5 mL (1 mg total) under the skin once a week for 28 days Do not start before December 16, 2023.    [START ON 1/13/2024] Semaglutide-Weight Management (WEGOVY) 1.7 MG/0.75ML Inject 0.75 mL (1.7 mg total) under the skin once a week for 28 days Do not start before January 13, 2024.    [START ON 2/10/2024] Semaglutide-Weight Management (WEGOVY) 2.4 MG/0.75ML Inject 0.75 mL (2.4 mg total) under the skin once a week for 28 days Do not start before February 10, 2024.       Objective     /78 (BP Location: Right arm, Patient Position: Sitting, Cuff Size: Large)   Pulse 70   Temp 98.1 °F (36.7 °C) (Temporal)   Resp 18   Wt 102 kg (225 lb)   SpO2 98%   BMI 33.23 kg/m²     Physical Exam  Vitals and nursing note reviewed.   Constitutional:       Appearance: Normal appearance.   HENT:      Head: Normocephalic and atraumatic.      Right Ear: A middle ear effusion is present.      Left Ear: A middle ear effusion is present.      Ears:      Comments: Moderate amount of fluid behind each ear, no infected fluid.  Right ear canal is erythematous, but not swollen.  No discharge from the right ear.     Nose: Congestion present.      Mouth/Throat:      Mouth: Mucous membranes are moist.      Pharynx: Posterior oropharyngeal erythema present. No oropharyngeal exudate.   Cardiovascular:      Rate and Rhythm: Normal rate and regular rhythm.      Pulses: Normal pulses.      Heart sounds: No murmur heard.  Pulmonary:      Effort: Pulmonary effort is normal. No respiratory distress.      Breath sounds: No stridor. No wheezing, rhonchi or rales.   Lymphadenopathy:      Head:      Right side of head: No submental, submandibular, preauricular or posterior auricular  adenopathy.      Left side of head: No submental, submandibular, preauricular or posterior auricular adenopathy.      Cervical: No cervical adenopathy.      Right cervical: No superficial cervical adenopathy.     Left cervical: No superficial cervical adenopathy.   Skin:     General: Skin is warm and dry.      Capillary Refill: Capillary refill takes less than 2 seconds.      Coloration: Skin is not pale.   Neurological:      General: No focal deficit present.      Mental Status: He is alert and oriented to person, place, and time.   Psychiatric:         Mood and Affect: Mood normal.         Behavior: Behavior normal.         Judgment: Judgment normal.       Vikki Pringle PA-C

## 2024-02-18 PROBLEM — J01.90 ACUTE NON-RECURRENT SINUSITIS: Status: RESOLVED | Noted: 2023-12-20 | Resolved: 2024-02-18

## 2024-03-12 ENCOUNTER — TELEPHONE (OUTPATIENT)
Dept: ADMINISTRATIVE | Facility: OTHER | Age: 54
End: 2024-03-12

## 2024-03-12 ENCOUNTER — TELEPHONE (OUTPATIENT)
Dept: FAMILY MEDICINE CLINIC | Facility: CLINIC | Age: 54
End: 2024-03-12

## 2024-03-12 NOTE — TELEPHONE ENCOUNTER
----- Message from Allison Andrew MA sent at 3/12/2024  8:29 AM EDT -----  Regarding: Care Gap Request  03/12/24 8:29 AM    Hello, our patient David Kaur has had CRC: Colonoscopy completed/performed. Please assist in updating the patient chart by pulling the Care Everywhere (CE) document. The date of service is 3/7/24.     Thank you,  Allison Andrew  [facility]

## 2024-03-12 NOTE — TELEPHONE ENCOUNTER
Upon review of the In Basket request we were able to locate, review, and update the patient chart as requested for CRC: Colonoscopy.    Any additional questions or concerns should be emailed to the Practice Liaisons via the appropriate education email address, please do not reply via In Basket.    Thank you  Nicole Pena

## 2024-03-26 NOTE — PROGRESS NOTES
Daily Note/Re-evaluation    Today's date: 6/15/2021  Patient name: Rosalva Cheng  : 1970  MRN: [id number]  Referring provider: Rigoberto Suarez MD  Dx:   Encounter Diagnosis     ICD-10-CM    1  Thoracic outlet syndrome of right thoracic outlet  G54 0                   Subjective: Stress from mother's recent passing has brought on his headaches again  Able to still do his prone exercises without triggering or worsening his symptoms  Prior was able to help his mother with transfers without triggering symptoms  Objective: See treatment diary below      Assessment: Tolerated treatment well  Patient exhibited good technique with therapeutic exercises and would benefit from continued PT  UE strength- mild weakness in shoulder elevators   II R/L 80/120  Active neural tension sh abd to 140 degrees- no peripheral symptoms  Pt has had slight increase in tension to his anterior cervical musculature on his R  Mild SCM tightness  Moderate inferior capsule tightness in R shoulder    While most of his program has been focused on his proximal extremity, we added putty exercises for more intrinsic/distal strengthening for his R hand to help further improve his  and pinch strength for normal functioning       Goals  STG (2-3 weeks)  1: pt independent in HEP- met  2: improve cervical mobility by 25%- met  3: improve neural mobility by 25%- met  4: full passive ROM shoulder WNL- met    LTG (4-6 weeks)  1: improve FOTO 10-15 pts- met  2: Pt able to perform overhead activities without limitation- partially met  3: reduce frequency/intensity of cervicogenic headaches by 25%- partially met- recent relapse  4: RC/periscapular strength 4 to 4+/5- met  5: Pt able to perform dynamic exercises with efficient core activation- met  6: thoracic rotation WNL- met  7: improve neural mobility by 50%- met  8: Pt to return to previous recreational activities- partially met  9: Improve  strength within 20 lbs of uninvolved side- not met    Plan: Continue per plan of care        Precautions: DOS 1/14/21  HEP: cervical rotation, ext, flexion stretch; wand overhead AA flex, doorway stretch, diaphragmatic breathing, upper trunk rotation, PNG's median bias, prone scap retraction, ext, abd    Manuals 4/22 4/27 4/29 5/4 5/6 5/13 5/18 5/20 5/25 6/15   Gentle cervical PROM supine and added ext off table 10 10 10 10 10 10 10 10 10 10   SCM STM          5   Shoulder PROM/AA /pec stretch PNGs 10 5 5 5 10 10 10 15 10 15    20 15 15 15 20 20 20 25 20 30   Neuro Re-Ed                                                                                                        Ther Ex             TB row RTB 3x10 RTB 3x10 GTB 3x10 RTB 3x10 GTB 2x10 GTB 2x10 RTB 2x10 GTB 2x10 GTB 2x10 GTB 2x10    TB robberies RTB 3x10 RTB 3x10 GTB 3x10 RTB 3x10 GTB 2x10 GTB 2x10 RTB 2x10 GTB 2x10 GTB 2x10 GTB 2x10   TB lawnmower RTB 3x10 RTB 3x10 GTB 3x10 RTB 3x10 GTB 2x10 GTB 2x10 RTB 2x10 GTB 2x10 GTB 2x10 GTB 2x10   TB extension RTB 3x10 RTB 3x10 GTB 3x10 RTB 3x10 RTB 2x10 GTB 2x10 RTB 2x10 GTB 2x10 GTB 2x10 GTB 2x10   TB PNF D2 RTB 3x10 RTB 3x10 GTB 3x10 RTB 3x10 GTB 2x10 GTB 2x10 RTB 2x10 GTB 2x10 GTB 2x10 GTB 2x10   Prone push ups 15x 15x 15x 15x 15x 15x 15x 15 15 15   Wall slides BL- lean 15x 15x 15x 15x 15x 15x 15x 15 15 15                Standing ER- foam roll against wall 3# 3x10 3# 3x10 3# 3x10 3# 3x10 3# 3x10 3# 3x10 3# 3x10 4# 2x10 + raises 4# 2x10 + raises 3# 2x10   Ball roll on plinth/lat stretch 15x each 15x each 15x each 15x 15x 15x 15x 15x 15x 15x   flexbar stability     1 min x2 NP        20/10 15/10 15/10 10/10 15/10  15/10 15/10 15/10 15/10   Ther Activity             UBE warmup   3/3min 3/3min 3/3min 3/3min 3/3min 3/3min 3/3min 3/3min                Gait Training                                       Modalities             MH 10min 10 min           CP Chronic Conditions Affecting Care

## 2024-04-29 ENCOUNTER — OFFICE VISIT (OUTPATIENT)
Dept: FAMILY MEDICINE CLINIC | Facility: CLINIC | Age: 54
End: 2024-04-29
Payer: COMMERCIAL

## 2024-04-29 VITALS
SYSTOLIC BLOOD PRESSURE: 120 MMHG | HEIGHT: 69 IN | WEIGHT: 228.4 LBS | BODY MASS INDEX: 33.83 KG/M2 | DIASTOLIC BLOOD PRESSURE: 80 MMHG | TEMPERATURE: 98.2 F | OXYGEN SATURATION: 95 % | HEART RATE: 97 BPM

## 2024-04-29 DIAGNOSIS — M54.12 CERVICAL RADICULOPATHY: ICD-10-CM

## 2024-04-29 DIAGNOSIS — M79.18 MYOFASCIAL PAIN SYNDROME: ICD-10-CM

## 2024-04-29 DIAGNOSIS — R51.9 CHRONIC DAILY HEADACHE: ICD-10-CM

## 2024-04-29 DIAGNOSIS — I10 ESSENTIAL HYPERTENSION: Primary | ICD-10-CM

## 2024-04-29 PROCEDURE — 3079F DIAST BP 80-89 MM HG: CPT | Performed by: FAMILY MEDICINE

## 2024-04-29 PROCEDURE — 99214 OFFICE O/P EST MOD 30 MIN: CPT | Performed by: FAMILY MEDICINE

## 2024-04-29 PROCEDURE — 3074F SYST BP LT 130 MM HG: CPT | Performed by: FAMILY MEDICINE

## 2024-04-29 PROCEDURE — 3725F SCREEN DEPRESSION PERFORMED: CPT | Performed by: FAMILY MEDICINE

## 2024-04-29 RX ORDER — METHOCARBAMOL 750 MG/1
750 TABLET, FILM COATED ORAL EVERY 8 HOURS SCHEDULED
Qty: 90 TABLET | Refills: 0 | Status: SHIPPED | OUTPATIENT
Start: 2024-04-29

## 2024-04-29 RX ORDER — PREGABALIN 50 MG/1
CAPSULE ORAL
Qty: 90 CAPSULE | Refills: 0 | Status: SHIPPED | OUTPATIENT
Start: 2024-04-29 | End: 2024-12-01

## 2024-04-29 NOTE — PROGRESS NOTES
Assessment/Plan:   1. Essential hypertension  Blood pressure appears very well-controlled today.  Continue with routine home monitoring as well as his current treatment with lisinopril.    2. Cervical radiculopathy/Chronic daily headache/Myofascial pain syndrome  Symptoms appear relatively stable.  At this time, we will continue with his current treatment of methocarbamol as well as his Lyrica.  He is following with a physical therapist regularly.  Follow-up with patient in 6 months or as needed.  - pregabalin (LYRICA) 50 mg capsule; Take 1 capsule (50 mg total) by mouth every morning AND 2 capsules (100 mg total) daily at bedtime.  Dispense: 90 capsule; Refill: 0  - methocarbamol (ROBAXIN) 750 mg tablet; Take 1 tablet (750 mg total) by mouth every 8 (eight) hours  Dispense: 90 tablet; Refill: 0             There are no diagnoses linked to this encounter.      Subjective:       Chief Complaint   Patient presents with    Follow-up      Patient ID: David Kaur is a 53 y.o. male presents today for a follow-up on his chronic conditions.  He has hypertension, cervical radiculopathy, chronic headaches, myofascial pain syndrome.  He has been taking his medications regularly.  He denies adverse reactions with his medications.    HPI    Review of Systems   Constitutional:  Negative for activity change, chills, fatigue and fever.   HENT:  Negative for congestion, ear pain, sinus pressure and sore throat.    Eyes:  Negative for redness, itching and visual disturbance.   Respiratory:  Negative for cough and shortness of breath.    Cardiovascular:  Negative for chest pain and palpitations.   Gastrointestinal:  Negative for abdominal pain, diarrhea and nausea.   Endocrine: Negative for cold intolerance and heat intolerance.   Genitourinary:  Negative for dysuria, flank pain and frequency.   Musculoskeletal:  Negative for arthralgias, back pain, gait problem and myalgias.   Skin:  Negative for color change.  "  Allergic/Immunologic: Negative for environmental allergies.   Neurological:  Negative for dizziness, numbness and headaches.   Psychiatric/Behavioral:  Negative for behavioral problems and sleep disturbance.        The following portions of the patient's history were reviewed and updated as appropriate : past family history, past medical history, past social history and past surgical history.    Current Outpatient Medications:     albuterol (Ventolin HFA) 90 mcg/act inhaler, Inhale 2 puffs every 6 (six) hours as needed for wheezing or shortness of breath, Disp: 18 g, Rfl: 0    cetirizine (ZyrTEC) 10 mg tablet, Take 10 mg by mouth as needed  , Disp: , Rfl:     clindamycin (CLEOCIN T) 1 % external solution, Apply topically 2 (two) times a day, Disp: 30 mL, Rfl: 0    indomethacin (INDOCIN) 25 mg capsule, Take 1 capsule (25 mg total) by mouth 3 (three) times a day with meals, Disp: 90 capsule, Rfl: 0    ketoconazole (NIZORAL) 2 % shampoo, Apply 1 application topically once for 1 dose, Disp: 120 mL, Rfl: 1    lisinopril (ZESTRIL) 10 mg tablet, Take 1 tablet (10 mg total) by mouth daily, Disp: 30 tablet, Rfl: 3    methocarbamol (ROBAXIN) 750 mg tablet, Take 1 tablet (750 mg total) by mouth every 8 (eight) hours, Disp: 90 tablet, Rfl: 0    Multiple Vitamin (MULTIVITAMIN) capsule, Take 1 capsule by mouth daily, Disp: , Rfl:     omeprazole (PriLOSEC) 20 mg delayed release capsule, Take 1 capsule (20 mg total) by mouth daily, Disp: 30 capsule, Rfl: 1    pregabalin (LYRICA) 50 mg capsule, Take 1 capsule (50 mg total) by mouth every morning AND 2 capsules (100 mg total) daily at bedtime., Disp: 90 capsule, Rfl: 0    Probiotic Product (PROBIOTIC ADVANCED PO), Take by mouth daily, Disp: , Rfl:          Objective:         Vitals:    04/29/24 1547   BP: 120/80   BP Location: Left arm   Patient Position: Sitting   Cuff Size: Adult   Pulse: 97   Temp: 98.2 °F (36.8 °C)   SpO2: 95%   Weight: 104 kg (228 lb 6.4 oz)   Height: 5' 9\" " (1.753 m)     Physical Exam  Vitals reviewed.   Constitutional:       General: He is not in acute distress.     Appearance: Normal appearance. He is well-developed.   HENT:      Head: Normocephalic and atraumatic.      Right Ear: Tympanic membrane, ear canal and external ear normal. There is no impacted cerumen.      Left Ear: Tympanic membrane, ear canal and external ear normal. There is no impacted cerumen.      Nose: Nose normal. No congestion or rhinorrhea.      Mouth/Throat:      Mouth: Mucous membranes are moist.      Pharynx: No oropharyngeal exudate or posterior oropharyngeal erythema.   Eyes:      General: No scleral icterus.        Right eye: No discharge.         Left eye: No discharge.      Extraocular Movements: Extraocular movements intact.      Conjunctiva/sclera: Conjunctivae normal.      Pupils: Pupils are equal, round, and reactive to light.   Neck:      Trachea: No tracheal deviation.   Cardiovascular:      Rate and Rhythm: Normal rate and regular rhythm.      Pulses: Normal pulses.           Dorsalis pedis pulses are 2+ on the right side and 2+ on the left side.        Posterior tibial pulses are 2+ on the right side and 2+ on the left side.      Heart sounds: Normal heart sounds. No murmur heard.     No friction rub. No gallop.   Pulmonary:      Effort: Pulmonary effort is normal. No respiratory distress.      Breath sounds: Normal breath sounds. No wheezing, rhonchi or rales.   Abdominal:      General: Bowel sounds are normal. There is no distension.      Palpations: Abdomen is soft.      Tenderness: There is no abdominal tenderness. There is no guarding or rebound.   Musculoskeletal:         General: Normal range of motion.      Cervical back: Normal range of motion and neck supple.      Right lower leg: No edema.      Left lower leg: No edema.   Lymphadenopathy:      Head:      Right side of head: No submental or submandibular adenopathy.      Left side of head: No submental or submandibular  adenopathy.      Cervical: No cervical adenopathy.      Right cervical: No superficial, deep or posterior cervical adenopathy.     Left cervical: No superficial, deep or posterior cervical adenopathy.   Skin:     General: Skin is warm and dry.      Findings: No erythema.   Neurological:      General: No focal deficit present.      Mental Status: He is alert and oriented to person, place, and time.      Cranial Nerves: No cranial nerve deficit.      Sensory: Sensation is intact. No sensory deficit.      Motor: Motor function is intact.   Psychiatric:         Attention and Perception: Attention and perception normal.         Mood and Affect: Mood is not anxious or depressed.         Speech: Speech normal.         Behavior: Behavior normal.         Thought Content: Thought content normal.         Judgment: Judgment normal.

## 2024-04-30 ENCOUNTER — APPOINTMENT (OUTPATIENT)
Dept: LAB | Facility: CLINIC | Age: 54
End: 2024-04-30
Payer: COMMERCIAL

## 2024-04-30 DIAGNOSIS — R79.89 LOW TESTOSTERONE: ICD-10-CM

## 2024-04-30 LAB
FSH SERPL-ACNC: 6.9 MIU/ML
LH SERPL-ACNC: 2.2 MIU/ML

## 2024-04-30 PROCEDURE — 84402 ASSAY OF FREE TESTOSTERONE: CPT

## 2024-04-30 PROCEDURE — 36415 COLL VENOUS BLD VENIPUNCTURE: CPT

## 2024-04-30 PROCEDURE — 84403 ASSAY OF TOTAL TESTOSTERONE: CPT

## 2024-04-30 PROCEDURE — 83001 ASSAY OF GONADOTROPIN (FSH): CPT

## 2024-04-30 PROCEDURE — 83002 ASSAY OF GONADOTROPIN (LH): CPT

## 2024-05-01 LAB
TESTOST FREE SERPL-MCNC: 6.1 PG/ML (ref 7.2–24)
TESTOST SERPL-MCNC: 263 NG/DL (ref 264–916)

## 2024-06-28 ENCOUNTER — TELEPHONE (OUTPATIENT)
Age: 54
End: 2024-06-28

## 2024-06-28 DIAGNOSIS — R51.9 CHRONIC DAILY HEADACHE: ICD-10-CM

## 2024-06-28 DIAGNOSIS — M54.12 CERVICAL RADICULOPATHY: ICD-10-CM

## 2024-06-28 RX ORDER — PREGABALIN 50 MG/1
CAPSULE ORAL
Qty: 90 CAPSULE | Refills: 0 | Status: SHIPPED | OUTPATIENT
Start: 2024-06-28 | End: 2024-07-28

## 2024-06-28 NOTE — TELEPHONE ENCOUNTER
Pt called stated he has been getting his headaches again and is all out of his medication.  Pregabalin  Last prescribed and seen 4/2024    I sent a high priority refill to clinical pod.    Pt is willing to be seen if needed, no appts available.

## 2024-06-28 NOTE — TELEPHONE ENCOUNTER
"Called patient let him know his prescription was sent to [facility] pharmacy on file and he doesn't need to come in for an appointment prior as he was seen in April. Patient says \"Thank you\".   "

## 2024-06-28 NOTE — TELEPHONE ENCOUNTER
Medication: Pregabalin    Dose/Frequency: 50mg TID    Quantity: 90    Pharmacy: [facility]  [place]    Office:   [x] PCP/Provider -   [] Speciality/Provider -     Does the patient have enough for 3 days?   [] Yes   [x] No - Send as HP to POD

## 2024-09-23 DIAGNOSIS — R51.9 CHRONIC DAILY HEADACHE: ICD-10-CM

## 2024-09-23 DIAGNOSIS — M54.12 CERVICAL RADICULOPATHY: ICD-10-CM

## 2024-09-24 RX ORDER — PREGABALIN 50 MG/1
CAPSULE ORAL
Qty: 90 CAPSULE | Refills: 0 | Status: SHIPPED | OUTPATIENT
Start: 2024-09-24 | End: 2024-10-24

## 2025-01-15 DIAGNOSIS — M54.12 CERVICAL RADICULOPATHY: ICD-10-CM

## 2025-01-15 DIAGNOSIS — R51.9 CHRONIC DAILY HEADACHE: ICD-10-CM

## 2025-01-15 RX ORDER — PREGABALIN 50 MG/1
CAPSULE ORAL
Qty: 90 CAPSULE | Refills: 0 | Status: SHIPPED | OUTPATIENT
Start: 2025-01-15 | End: 2025-02-14

## 2025-07-15 ENCOUNTER — TELEPHONE (OUTPATIENT)
Age: 55
End: 2025-07-15

## 2025-07-15 ENCOUNTER — OFFICE VISIT (OUTPATIENT)
Dept: URGENT CARE | Facility: CLINIC | Age: 55
End: 2025-07-15
Payer: COMMERCIAL

## 2025-07-15 VITALS
SYSTOLIC BLOOD PRESSURE: 126 MMHG | OXYGEN SATURATION: 98 % | DIASTOLIC BLOOD PRESSURE: 84 MMHG | HEART RATE: 68 BPM | TEMPERATURE: 97.7 F | RESPIRATION RATE: 18 BRPM

## 2025-07-15 DIAGNOSIS — J32.9 SINOBRONCHITIS: Primary | ICD-10-CM

## 2025-07-15 DIAGNOSIS — J40 SINOBRONCHITIS: Primary | ICD-10-CM

## 2025-07-15 PROCEDURE — 99203 OFFICE O/P NEW LOW 30 MIN: CPT | Performed by: PHYSICIAN ASSISTANT

## 2025-07-15 RX ORDER — DOXYCYCLINE 100 MG/1
100 TABLET ORAL 2 TIMES DAILY
Qty: 14 TABLET | Refills: 0 | Status: SHIPPED | OUTPATIENT
Start: 2025-07-15 | End: 2025-07-22

## 2025-07-15 RX ORDER — PREDNISONE 20 MG/1
TABLET ORAL
Qty: 10 TABLET | Refills: 0 | Status: SHIPPED | OUTPATIENT
Start: 2025-07-15

## (undated) DEVICE — SUT MONOCRYL 3-0 SH 27 IN Y416H

## (undated) DEVICE — OCCLUSIVE GAUZE STRIP,3% BISMUTH TRIBROMOPHENATE IN PETROLATUM BLEND: Brand: XEROFORM

## (undated) DEVICE — NEEDLE 18 G X 1 1/2

## (undated) DEVICE — GLOVE SRG BIOGEL ORTHOPEDIC 7.5

## (undated) DEVICE — STRETCH BANDAGE: Brand: CURITY

## (undated) DEVICE — SCD SEQUENTIAL COMPRESSION COMFORT SLEEVE MEDIUM KNEE LENGTH: Brand: KENDALL SCD

## (undated) DEVICE — UNIVERSAL  MINOR EXTREMITY PK: Brand: CARDINAL HEALTH

## (undated) DEVICE — 2000CC GUARDIAN II: Brand: GUARDIAN

## (undated) DEVICE — TUBING SUCTION 5MM X 12 FT

## (undated) DEVICE — CHLORAPREP HI-LITE 26ML ORANGE

## (undated) DEVICE — NEEDLE 25G X 1 1/2

## (undated) DEVICE — PREP PAD BNS: Brand: CONVERTORS

## (undated) DEVICE — SUT VICRYL 4-0 PS-2 27 IN J426H

## (undated) DEVICE — STOCKINETTE REGULAR

## (undated) DEVICE — SUT VICRYL 3-0 SH 27 IN J416H

## (undated) DEVICE — GLOVE INDICATOR PI UNDERGLOVE SZ 7.5 BLUE

## (undated) DEVICE — PAD CAST 4 IN COTTON NON STERILE

## (undated) DEVICE — SYRINGE 10ML LL

## (undated) DEVICE — 10FR FRAZIER SUCTION HANDLE: Brand: CARDINAL HEALTH

## (undated) DEVICE — SYRINGE 3ML LL

## (undated) DEVICE — CAST PADDING 4 IN SYNTHETIC NON-STRL

## (undated) DEVICE — PADDING CAST 4 IN  COTTON STRL

## (undated) DEVICE — INTENDED FOR TISSUE SEPARATION, AND OTHER PROCEDURES THAT REQUIRE A SHARP SURGICAL BLADE TO PUNCTURE OR CUT.: Brand: BARD-PARKER ® CARBON RIB-BACK BLADES